# Patient Record
Sex: MALE | Race: WHITE | NOT HISPANIC OR LATINO | URBAN - METROPOLITAN AREA
[De-identification: names, ages, dates, MRNs, and addresses within clinical notes are randomized per-mention and may not be internally consistent; named-entity substitution may affect disease eponyms.]

---

## 2023-05-17 DIAGNOSIS — R53.83 OTHER FATIGUE: Primary | ICD-10-CM

## 2023-05-17 RX ORDER — MULTIVITAMIN
1 TABLET ORAL DAILY
Qty: 30 TABLET | Refills: 0 | Status: SHIPPED | OUTPATIENT
Start: 2023-05-17 | End: 2023-06-01 | Stop reason: SDUPTHER

## 2023-05-17 RX ORDER — APIXABAN 5 MG/1
5 TABLET, FILM COATED ORAL
COMMUNITY
End: 2023-05-17 | Stop reason: SDUPTHER

## 2023-05-17 RX ORDER — FOLIC ACID 1 MG/1
1 TABLET ORAL DAILY
COMMUNITY
Start: 2022-11-10 | End: 2023-05-17 | Stop reason: SDUPTHER

## 2023-05-17 RX ORDER — METOPROLOL SUCCINATE 50 MG/1
50 TABLET, EXTENDED RELEASE ORAL DAILY
Qty: 30 TABLET | Refills: 0 | Status: SHIPPED | OUTPATIENT
Start: 2023-05-17 | End: 2023-06-16

## 2023-05-17 RX ORDER — LANOLIN ALCOHOL/MO/W.PET/CERES
100 CREAM (GRAM) TOPICAL DAILY
Qty: 30 TABLET | Refills: 0 | Status: SHIPPED | OUTPATIENT
Start: 2023-05-17 | End: 2023-08-23

## 2023-05-17 RX ORDER — FOLIC ACID 1 MG/1
1 TABLET ORAL DAILY
Qty: 30 TABLET | Refills: 0 | Status: SHIPPED | OUTPATIENT
Start: 2023-05-17 | End: 2023-06-16

## 2023-05-17 RX ORDER — METOPROLOL SUCCINATE 50 MG/1
1 TABLET, EXTENDED RELEASE ORAL DAILY
COMMUNITY
Start: 2022-11-10 | End: 2023-05-17 | Stop reason: SDUPTHER

## 2023-05-17 RX ORDER — MULTIVITAMIN
1 TABLET ORAL DAILY
COMMUNITY
End: 2023-05-17 | Stop reason: SDUPTHER

## 2023-05-17 RX ORDER — SPIRONOLACTONE 25 MG/1
1 TABLET ORAL DAILY
COMMUNITY
Start: 2022-11-10 | End: 2023-05-17 | Stop reason: SDUPTHER

## 2023-05-17 RX ORDER — LANOLIN ALCOHOL/MO/W.PET/CERES
100 CREAM (GRAM) TOPICAL DAILY
COMMUNITY
End: 2023-05-17 | Stop reason: SDUPTHER

## 2023-05-17 RX ORDER — SPIRONOLACTONE 25 MG/1
25 TABLET ORAL DAILY
Qty: 30 TABLET | Refills: 0 | Status: SHIPPED | OUTPATIENT
Start: 2023-05-17 | End: 2023-09-22

## 2023-05-17 NOTE — TELEPHONE ENCOUNTER
patient in need of Eliquis 5 mg 2x day, Folic Acid 1 mg 1-day, Metroplolo 50 mg 1-day, Multi Vit. 1-day, Spirlactone 25 mg1-day, Thiamine 100 mg 1-day to [address]

## 2023-05-24 PROBLEM — I42.8 NONISCHEMIC CARDIOMYOPATHY (MULTI): Status: ACTIVE | Noted: 2023-05-24

## 2023-05-24 PROBLEM — F10.20 ALCOHOL DEPENDENCE (MULTI): Status: ACTIVE | Noted: 2023-05-24

## 2023-05-24 PROBLEM — M06.9 RHEUMATOID ARTHRITIS (MULTI): Status: ACTIVE | Noted: 2023-05-24

## 2023-05-24 PROBLEM — I48.0 PAROXYSMAL ATRIAL FIBRILLATION WITH RAPID VENTRICULAR RESPONSE (MULTI): Status: ACTIVE | Noted: 2023-05-24

## 2023-05-24 PROBLEM — I27.20 PULMONARY HYPERTENSION (MULTI): Status: ACTIVE | Noted: 2023-05-24

## 2023-05-24 PROBLEM — I50.22 CHRONIC SYSTOLIC HEART FAILURE, ACC/AHA STAGE C (MULTI): Status: ACTIVE | Noted: 2023-05-24

## 2023-05-24 PROBLEM — K59.00 CONSTIPATION: Status: ACTIVE | Noted: 2023-05-24

## 2023-05-24 PROBLEM — M21.372 FOOT DROP, LEFT FOOT: Status: ACTIVE | Noted: 2023-05-24

## 2023-05-24 PROBLEM — I10 HYPERTENSION: Status: ACTIVE | Noted: 2023-05-24

## 2023-05-24 PROBLEM — I07.1 TRICUSPID REGURGITATION: Status: ACTIVE | Noted: 2023-05-24

## 2023-05-24 RX ORDER — LANOLIN ALCOHOL/MO/W.PET/CERES
1 CREAM (GRAM) TOPICAL 2 TIMES DAILY
COMMUNITY
Start: 2022-06-13 | End: 2023-06-01 | Stop reason: SDUPTHER

## 2023-05-24 RX ORDER — SACUBITRIL AND VALSARTAN 49; 51 MG/1; MG/1
1 TABLET, FILM COATED ORAL 2 TIMES DAILY
COMMUNITY
End: 2023-09-28

## 2023-05-31 NOTE — PROGRESS NOTES
Subjective   Patient ID: Del Gupta is a 62 y.o. male who presents for Annual Exam (Pt here today for a check up and feeling ok he states. ), Med Refill (Pending ), and Medication Question (Pt states last script said once daily for Eliquis but he thought it was supposed to be twice daily; please resend med if so. ).    HPI     Preventive:   - Lives at home in [place] by himself - home life is good   - Labs: DUE   - Colon CA: DUE   - Shingrix: DUE   - Td: UTD   - COVID-19 vax: DUE, DECLINED   - Lung CA screen (Smoker): Never   - Diet: healthy, well balanced   - Exercise: good, walking around the stores   - Illicit drugs: no   - Alcohol: hasn't had in over a year      CHF/ A fib/ HTN/ Pulm HTN:   - Followed by cardiologist, Dr. Pratt, sees him regulalry and has been stable     Rheumatoid arthritis:  - Doing ok, sees an ortho - drop foot is improving   - Needs referral to rheumatologist as it is painful for him     Review of Systems   Respiratory:  Negative for chest tightness and shortness of breath.    Cardiovascular:  Negative for chest pain, palpitations and leg swelling.   Musculoskeletal:  Positive for arthralgias, gait problem, joint swelling and myalgias.       Objective   BP (!) 142/100   Pulse 71   Temp 37.3 °C (99.1 °F)   Resp 20   Ht 1.829 m (6')   Wt 78.9 kg (174 lb)   SpO2 99%   BMI 23.60 kg/m²     Physical Exam  Constitutional:       General: He is not in acute distress.     Appearance: He is not ill-appearing.   Cardiovascular:      Rate and Rhythm: Normal rate and regular rhythm.   Pulmonary:      Effort: Pulmonary effort is normal.      Breath sounds: Normal breath sounds.   Musculoskeletal:      Right hand: Deformity (ulnar deviation of MCP joints) present.      Left hand: Deformity (ulnar deviation of MCP joints) present.   Neurological:      Mental Status: He is alert.   Psychiatric:         Mood and Affect: Mood normal.         Assessment/Plan   Problem List Items Addressed This  Visit       Chronic systolic heart failure, ACC/AHA stage C (CMS/HCC)    Relevant Orders    Disability Placard    CBC    Comprehensive Metabolic Panel    Hemoglobin A1C    Lipid Panel    Magnesium    Foot drop, left foot    Relevant Orders    Disability Placard    Hypertension    Relevant Orders    Disability Placard    Rheumatoid arthritis (CMS/HCC)    Relevant Orders    Disability Placard    Referral to Rheumatology     Other Visit Diagnoses       Screening for colon cancer    -  Primary    Relevant Orders    Cologuard® colon cancer screening    Other fatigue        Relevant Medications    magnesium oxide (Mag-Ox) 400 mg (241.3 mg magnesium) tablet    multivitamin tablet    apixaban (Eliquis) 5 mg tablet    Need for shingles vaccine        Relevant Medications    zoster vaccine-recombinant adjuvanted (Shingrix) 50 mcg/0.5 mL vaccine

## 2023-06-01 ENCOUNTER — OFFICE VISIT (OUTPATIENT)
Dept: PRIMARY CARE | Facility: CLINIC | Age: 63
End: 2023-06-01
Payer: MEDICAID

## 2023-06-01 VITALS
SYSTOLIC BLOOD PRESSURE: 142 MMHG | BODY MASS INDEX: 23.57 KG/M2 | TEMPERATURE: 99.1 F | OXYGEN SATURATION: 99 % | DIASTOLIC BLOOD PRESSURE: 100 MMHG | HEART RATE: 71 BPM | WEIGHT: 174 LBS | RESPIRATION RATE: 20 BRPM | HEIGHT: 72 IN

## 2023-06-01 DIAGNOSIS — I10 PRIMARY HYPERTENSION: ICD-10-CM

## 2023-06-01 DIAGNOSIS — M06.9 RHEUMATOID ARTHRITIS, INVOLVING UNSPECIFIED SITE, UNSPECIFIED WHETHER RHEUMATOID FACTOR PRESENT (MULTI): ICD-10-CM

## 2023-06-01 DIAGNOSIS — I50.22 CHRONIC SYSTOLIC HEART FAILURE, ACC/AHA STAGE C (MULTI): ICD-10-CM

## 2023-06-01 DIAGNOSIS — R53.83 OTHER FATIGUE: ICD-10-CM

## 2023-06-01 DIAGNOSIS — Z23 NEED FOR SHINGLES VACCINE: ICD-10-CM

## 2023-06-01 DIAGNOSIS — M21.372 FOOT DROP, LEFT FOOT: ICD-10-CM

## 2023-06-01 DIAGNOSIS — Z12.11 SCREENING FOR COLON CANCER: Primary | ICD-10-CM

## 2023-06-01 PROCEDURE — 3080F DIAST BP >= 90 MM HG: CPT | Performed by: PHYSICIAN ASSISTANT

## 2023-06-01 PROCEDURE — 3077F SYST BP >= 140 MM HG: CPT | Performed by: PHYSICIAN ASSISTANT

## 2023-06-01 PROCEDURE — 1036F TOBACCO NON-USER: CPT | Performed by: PHYSICIAN ASSISTANT

## 2023-06-01 PROCEDURE — 99396 PREV VISIT EST AGE 40-64: CPT | Performed by: PHYSICIAN ASSISTANT

## 2023-06-01 RX ORDER — MULTIVITAMIN
1 TABLET ORAL DAILY
Qty: 30 TABLET | Refills: 3 | Status: SHIPPED | OUTPATIENT
Start: 2023-06-01 | End: 2023-10-30

## 2023-06-01 RX ORDER — GABAPENTIN 100 MG/1
1 CAPSULE ORAL 2 TIMES DAILY
COMMUNITY
Start: 2022-08-30 | End: 2023-10-16 | Stop reason: ALTCHOICE

## 2023-06-01 RX ORDER — LANOLIN ALCOHOL/MO/W.PET/CERES
1 CREAM (GRAM) TOPICAL 2 TIMES DAILY
Qty: 60 TABLET | Refills: 3 | Status: SHIPPED | OUTPATIENT
Start: 2023-06-01 | End: 2023-10-30

## 2023-06-01 ASSESSMENT — ENCOUNTER SYMPTOMS
JOINT SWELLING: 1
ARTHRALGIAS: 1
MYALGIAS: 1
PALPITATIONS: 0
CHEST TIGHTNESS: 0
SHORTNESS OF BREATH: 0

## 2023-06-02 ENCOUNTER — TELEPHONE (OUTPATIENT)
Dept: PRIMARY CARE | Facility: CLINIC | Age: 63
End: 2023-06-02
Payer: MEDICAID

## 2023-06-02 NOTE — TELEPHONE ENCOUNTER
Pt seen 6/1/23 and given disability placard, his name was spelled incorrectly. It has now been corrected and a new placard needs printed. Please contact pt to let him know completed at [phone] and mail to pt

## 2023-06-15 DIAGNOSIS — R53.83 OTHER FATIGUE: ICD-10-CM

## 2023-06-16 RX ORDER — METOPROLOL SUCCINATE 50 MG/1
TABLET, EXTENDED RELEASE ORAL
Qty: 30 TABLET | Refills: 0 | Status: SHIPPED | OUTPATIENT
Start: 2023-06-16 | End: 2023-07-19 | Stop reason: SDUPTHER

## 2023-06-16 RX ORDER — FOLIC ACID 1 MG/1
TABLET ORAL
Qty: 30 TABLET | Refills: 0 | Status: SHIPPED | OUTPATIENT
Start: 2023-06-16 | End: 2023-07-19 | Stop reason: SDUPTHER

## 2023-07-18 DIAGNOSIS — R53.83 OTHER FATIGUE: ICD-10-CM

## 2023-07-19 RX ORDER — METOPROLOL SUCCINATE 50 MG/1
TABLET, EXTENDED RELEASE ORAL
Qty: 30 TABLET | Refills: 0 | Status: SHIPPED | OUTPATIENT
Start: 2023-07-19 | End: 2023-08-23

## 2023-07-19 RX ORDER — FOLIC ACID 1 MG/1
TABLET ORAL
Qty: 30 TABLET | Refills: 0 | Status: SHIPPED | OUTPATIENT
Start: 2023-07-19 | End: 2023-08-23

## 2023-08-22 DIAGNOSIS — R53.83 OTHER FATIGUE: ICD-10-CM

## 2023-08-23 RX ORDER — LANOLIN ALCOHOL/MO/W.PET/CERES
100 CREAM (GRAM) TOPICAL DAILY
Qty: 30 TABLET | Refills: 0 | Status: SHIPPED | OUTPATIENT
Start: 2023-08-23 | End: 2023-09-22

## 2023-08-23 RX ORDER — FOLIC ACID 1 MG/1
TABLET ORAL
Qty: 30 TABLET | Refills: 0 | Status: SHIPPED | OUTPATIENT
Start: 2023-08-23 | End: 2023-09-22

## 2023-08-23 RX ORDER — MULTIVITAMIN WITH FOLIC ACID 400 MCG
1 TABLET ORAL DAILY
Qty: 90 EACH | Refills: 1 | Status: SHIPPED | OUTPATIENT
Start: 2023-08-23 | End: 2023-10-16 | Stop reason: ALTCHOICE

## 2023-08-23 RX ORDER — METOPROLOL SUCCINATE 50 MG/1
TABLET, EXTENDED RELEASE ORAL
Qty: 30 TABLET | Refills: 0 | Status: SHIPPED | OUTPATIENT
Start: 2023-08-23 | End: 2023-09-22

## 2023-09-13 PROBLEM — M1A.9XX1 GOUT, TOPHACEOUS: Status: ACTIVE | Noted: 2023-09-13

## 2023-09-13 PROBLEM — M05.20 RHEUMATOID ARTERITIS (MULTI): Status: ACTIVE | Noted: 2023-09-13

## 2023-09-13 PROBLEM — M05.20 RHEUMATOID ARTERITIS (MULTI): Status: RESOLVED | Noted: 2023-09-13 | Resolved: 2023-09-13

## 2023-09-13 RX ORDER — LISINOPRIL 10 MG/1
1 TABLET ORAL DAILY
COMMUNITY
Start: 2016-05-13 | End: 2023-10-16 | Stop reason: ALTCHOICE

## 2023-09-13 RX ORDER — ACETAMINOPHEN 500 MG
2 TABLET ORAL 2 TIMES DAILY
COMMUNITY
Start: 2019-02-06

## 2023-09-13 RX ORDER — ALLOPURINOL 100 MG/1
4 TABLET ORAL DAILY
COMMUNITY
Start: 2016-05-05 | End: 2023-10-16 | Stop reason: ALTCHOICE

## 2023-09-13 RX ORDER — OMEPRAZOLE 20 MG/1
1 CAPSULE, DELAYED RELEASE ORAL DAILY
COMMUNITY
Start: 2017-06-18 | End: 2023-10-16 | Stop reason: ALTCHOICE

## 2023-09-13 RX ORDER — MELOXICAM 15 MG/1
15 TABLET ORAL
COMMUNITY
Start: 2016-11-15 | End: 2023-10-16 | Stop reason: ALTCHOICE

## 2023-09-13 RX ORDER — HYDRALAZINE HYDROCHLORIDE 50 MG/1
1 TABLET, FILM COATED ORAL EVERY 12 HOURS
COMMUNITY
Start: 2016-05-13 | End: 2023-10-16 | Stop reason: ALTCHOICE

## 2023-09-13 RX ORDER — METOPROLOL TARTRATE 50 MG/1
1 TABLET ORAL EVERY 12 HOURS
COMMUNITY
Start: 2016-05-13 | End: 2023-10-16 | Stop reason: ALTCHOICE

## 2023-09-22 DIAGNOSIS — R53.83 OTHER FATIGUE: ICD-10-CM

## 2023-09-22 RX ORDER — LANOLIN ALCOHOL/MO/W.PET/CERES
100 CREAM (GRAM) TOPICAL DAILY
Qty: 30 TABLET | Refills: 0 | Status: SHIPPED | OUTPATIENT
Start: 2023-09-22 | End: 2023-10-30

## 2023-09-22 RX ORDER — SPIRONOLACTONE 25 MG/1
25 TABLET ORAL DAILY
Qty: 90 TABLET | Refills: 0 | Status: SHIPPED | OUTPATIENT
Start: 2023-09-22 | End: 2023-12-27 | Stop reason: SDUPTHER

## 2023-09-22 RX ORDER — FOLIC ACID 1 MG/1
TABLET ORAL
Qty: 30 TABLET | Refills: 0 | Status: SHIPPED | OUTPATIENT
Start: 2023-09-22 | End: 2023-10-30

## 2023-09-22 RX ORDER — METOPROLOL SUCCINATE 50 MG/1
TABLET, EXTENDED RELEASE ORAL
Qty: 30 TABLET | Refills: 0 | Status: SHIPPED | OUTPATIENT
Start: 2023-09-22 | End: 2023-10-30

## 2023-09-28 DIAGNOSIS — I50.22 CHRONIC SYSTOLIC HEART FAILURE, ACC/AHA STAGE C (MULTI): Primary | ICD-10-CM

## 2023-09-28 RX ORDER — SACUBITRIL AND VALSARTAN 49; 51 MG/1; MG/1
1 TABLET, FILM COATED ORAL 2 TIMES DAILY
Qty: 180 TABLET | Refills: 1 | Status: SHIPPED | OUTPATIENT
Start: 2023-09-28 | End: 2023-12-28 | Stop reason: SDUPTHER

## 2023-10-16 ENCOUNTER — OFFICE VISIT (OUTPATIENT)
Dept: CARDIOLOGY | Facility: CLINIC | Age: 63
End: 2023-10-16
Payer: MEDICAID

## 2023-10-16 VITALS
DIASTOLIC BLOOD PRESSURE: 72 MMHG | BODY MASS INDEX: 23.38 KG/M2 | HEART RATE: 114 BPM | SYSTOLIC BLOOD PRESSURE: 136 MMHG | WEIGHT: 172.4 LBS

## 2023-10-16 DIAGNOSIS — I48.0 PAROXYSMAL ATRIAL FIBRILLATION WITH RAPID VENTRICULAR RESPONSE (MULTI): ICD-10-CM

## 2023-10-16 DIAGNOSIS — I50.22 CHRONIC SYSTOLIC HEART FAILURE, ACC/AHA STAGE C (MULTI): ICD-10-CM

## 2023-10-16 DIAGNOSIS — I48.11 LONGSTANDING PERSISTENT ATRIAL FIBRILLATION (MULTI): ICD-10-CM

## 2023-10-16 DIAGNOSIS — I10 PRIMARY HYPERTENSION: ICD-10-CM

## 2023-10-16 DIAGNOSIS — I07.1 TRICUSPID VALVE INSUFFICIENCY, UNSPECIFIED ETIOLOGY: ICD-10-CM

## 2023-10-16 DIAGNOSIS — Z78.9 NEVER SMOKED TOBACCO: ICD-10-CM

## 2023-10-16 DIAGNOSIS — I42.8 NONISCHEMIC CARDIOMYOPATHY (MULTI): ICD-10-CM

## 2023-10-16 DIAGNOSIS — I27.20 PULMONARY HYPERTENSION (MULTI): ICD-10-CM

## 2023-10-16 PROCEDURE — 3075F SYST BP GE 130 - 139MM HG: CPT | Performed by: INTERNAL MEDICINE

## 2023-10-16 PROCEDURE — 3078F DIAST BP <80 MM HG: CPT | Performed by: INTERNAL MEDICINE

## 2023-10-16 PROCEDURE — 1036F TOBACCO NON-USER: CPT | Performed by: INTERNAL MEDICINE

## 2023-10-16 PROCEDURE — 3008F BODY MASS INDEX DOCD: CPT | Performed by: INTERNAL MEDICINE

## 2023-10-16 PROCEDURE — 99214 OFFICE O/P EST MOD 30 MIN: CPT | Performed by: INTERNAL MEDICINE

## 2023-10-16 NOTE — PATIENT INSTRUCTIONS
Continue same medications/treatment.  Patient educated on proper medication use.  Patient educated on risk factor modification.  Please bring any lab results from other providers/physicians to your next appointment.    Please bring all medicines, vitamins, and herbal supplements with you when you come to the office.    Prescriptions will not be filled unless you are compliant with your follow up appointments or have a follow up appointment scheduled as per instruction of your physician. Refills should be requested at the time of your visit.    Follow up in 6 months    I, FELICITY TORIBIO RN, AM SCRIBING FOR AND IN THE PRESENCE OF DR. RANJIT BROCK, DO, FACC

## 2023-10-16 NOTE — PROGRESS NOTES
Patient:  Esvin Gupta  YOB: 1960  MRN: [id number]       HPI:       Esvin Gupta is a 62 y.o. male who returns today for cardiac follow-up.  Patient has a history of nonischemic cardiomyopathy and paroxysmal A-fib and is on appropriate medicines for both.      Objective:     There were no vitals filed for this visit.    Wt Readings from Last 4 Encounters:   06/01/23 78.9 kg (174 lb)   05/15/23 79.6 kg (175 lb 8 oz)   10/04/22 72.3 kg (159 lb 7 oz)   09/20/22 76.7 kg (169 lb)       Allergies:     No Known Allergies     Medications:     Current Outpatient Medications   Medication Instructions    acetaminophen (Tylenol) 500 mg tablet 2 tablets, oral, 2 times daily    allopurinol (Zyloprim) 100 mg tablet 4 tablets, oral, Daily    apixaban (ELIQUIS) 5 mg, oral, 2 times daily    folic acid (Folvite) 1 mg tablet take 1 tablet by mouth once daily    gabapentin (Neurontin) 100 mg capsule 1 capsule, oral, 2 times daily    hydrALAZINE (Apresoline) 50 mg tablet 1 tablet, oral, Every 12 hours    lisinopril 10 mg tablet 1 tablet, oral, Daily    magnesium oxide (MAG-OX) 400 mg, oral, 2 times daily    meloxicam (MOBIC) 15 mg, oral, Daily RT    metoprolol succinate XL (Toprol-XL) 50 mg 24 hr tablet take 1 tablet by mouth once daily    metoprolol tartrate (Lopressor) 50 mg tablet 1 tablet, oral, Every 12 hours    multivitamin tablet 1 tablet, oral, Daily    multivitamin with folic acid (Tab-A-Alf) 400 mcg tablet 1 tablet, oral, Daily    omeprazole (PriLOSEC) 20 mg DR capsule 1 capsule, oral, Daily    sacubitriL-valsartan (Entresto) 49-51 mg tablet 1 tablet, oral, 2 times daily    spironolactone (ALDACTONE) 25 mg, oral, Daily    thiamine (VITAMIN B-1) 100 mg, oral, Daily       Physical Examination:     Constitutional:       Appearance: Healthy appearance. Not in distress.   Neck:      Vascular: No JVR. JVD normal.   Pulmonary:      Effort: Pulmonary effort is normal.      Breath sounds: Normal breath  "sounds. No wheezing. No rhonchi. No rales.   Chest:      Chest wall: Not tender to palpatation.   Cardiovascular:      PMI at left midclavicular line. Normal rate. Regular rhythm. Normal S1. Normal S2.       Murmurs: There is no murmur.      No gallop.  No click. No rub.   Pulses:     Intact distal pulses.   Edema:     Peripheral edema absent.   Abdominal:      General: Bowel sounds are normal.      Palpations: Abdomen is soft.      Tenderness: There is no abdominal tenderness.   Musculoskeletal: Normal range of motion.         General: No tenderness. Skin:     General: Skin is warm and dry.   Neurological:      General: No focal deficit present.      Mental Status: Alert and oriented to person, place and time.          Lab:     CBC:   Lab Results   Component Value Date    WBC 6.7 06/15/2022    RBC 3.63 (L) 06/15/2022    HGB 13.0 (L) 06/15/2022    HCT 38.1 (L) 06/15/2022     06/15/2022        CMP:    Lab Results   Component Value Date     06/15/2022    K 4.5 06/15/2022     06/15/2022    CO2 25 06/15/2022    BUN 34 (H) 06/15/2022    CREATININE 0.70 06/15/2022    GLUCOSE 88 06/15/2022    CALCIUM 8.9 06/15/2022       Magnesium:    Lab Results   Component Value Date    MG 2.20 06/12/2022       Lipid Profile:    Lab Results   Component Value Date    TRIG 81 06/09/2022    HDL 31.0 (A) 06/09/2022       TSH:    Lab Results   Component Value Date    TSH 0.48 06/06/2022       BNP:   Lab Results   Component Value Date     (H) 06/06/2022        PT/INR:    Lab Results   Component Value Date    PROTIME 13.9 (H) 06/06/2022    INR 1.2 (H) 06/06/2022       HgBA1c:    No results found for: \"HGBA1C\"    BMP:  Lab Results   Component Value Date     06/15/2022     06/12/2022     06/11/2022    K 4.5 06/15/2022    K 3.9 06/12/2022    K 4.0 06/11/2022     06/15/2022     (H) 06/12/2022     (H) 06/11/2022    CO2 25 06/15/2022    CO2 25 06/12/2022    CO2 24 06/11/2022    BUN 34 (H) " 06/15/2022    BUN 13 06/12/2022    BUN 8 06/11/2022    CREATININE 0.70 06/15/2022    CREATININE 0.52 06/12/2022    CREATININE 0.55 06/11/2022       CBC:  Lab Results   Component Value Date    WBC 6.7 06/15/2022    WBC 5.3 06/12/2022    WBC 5.3 06/11/2022    RBC 3.63 (L) 06/15/2022    RBC 3.22 (L) 06/12/2022    RBC 3.15 (L) 06/11/2022    HGB 13.0 (L) 06/15/2022    HGB 11.5 (L) 06/12/2022    HGB 11.3 (L) 06/11/2022    HCT 38.1 (L) 06/15/2022    HCT 33.7 (L) 06/12/2022    HCT 33.1 (L) 06/11/2022     (H) 06/15/2022     (H) 06/12/2022     (H) 06/11/2022    MCHC 34.1 06/15/2022    MCHC 34.1 06/12/2022    MCHC 34.1 06/11/2022    RDW 12.0 06/15/2022    RDW 12.0 06/12/2022    RDW 12.2 06/11/2022     06/15/2022     (L) 06/12/2022     (L) 06/11/2022       Cardiac Enzymes:    Lab Results   Component Value Date    TROPHS 11 06/06/2022    TROPHS 8 06/06/2022       Hepatic Function Panel:    Lab Results   Component Value Date    ALKPHOS 60 06/15/2022    ALT 41 06/15/2022    AST 25 06/15/2022    PROT 6.2 (L) 06/15/2022    BILITOT 0.5 06/15/2022       Diagnostic Studies:     Electrocardiogram 12 Lead    Result Date: 2/15/2023  ATRIAL FIBRILLATION WITH RAPID VENTRICULAR RESPONSE Nonspecific ST and T wave abnormality Abnormal ECG When compared with ECG of 07-MAY-2008 02:59, ST now depressed in Anterior leads T wave inversion now evident in Anterior leads Confirmed by Howard Burrows [id number]) on 6/7/2022 2:53:48 PM    Electrocardiogram 12 Lead    Result Date: 2/15/2023  Atrial fibrillation with rapid ventricular response Low voltage QRS Nonspecific ST and T wave abnormality Abnormal ECG When compared with ECG of 06-JUN-2022 09:34, Previous ECG has undetermined rhythm, needs review ST no longer depressed in Anterior leads Confirmed by Howard Burrows [id number]) on 6/9/2022 4:52:37 PM    Electrocardiogram 12 Lead    Result Date: 2/15/2023  Sinus rhythm with premature atrial complexes Possible Left  atrial enlargement Low voltage QRS Prolonged QT Abnormal ECG When compared with ECG of 06-JUN-2022 15:41, Sinus rhythm has replaced Atrial fibrillation Vent. rate has decreased BY  95 BPM Nonspecific T wave abnormality no longer evident in Inferior leads Nonspecific T wave abnormality no longer evident in Anterior leads Confirmed by Aron Rice ([id number]) on 6/10/2022 9:03:56 AM    US RIGHT UPPER QUADRANT    Result Date: 6/7/2022  MRN: [id number] Patient Name: XIOMARA FLORES  STUDY: US RUQ ABDOMEN;  6/7/2022 8:28 am  INDICATION: ATRIAL FIBRILLATION WITH RAPID VENTRICULAR RESPONSE I48.91.  COMPARISON: None  ACCESSION NUMBER(S): [id number]  ORDERING CLINICIAN: TRAVIS SUGGS  TECHNIQUE: Transverse and longitudinal grayscale and color Doppler ultrasound of the right upper quadrant, including the liver, biliary system and pancreas, and including limited views of the right kidney  FINDINGS: LIVER: Contour:  Normal contour, without evidence of cirrhotic change. Echogenicity and Echotexture:  Diffusely echogenic relative to the right kidney, consistent with fatty change. Size (craniocaudal long axis, in cm): 15.2, within normal limits Other:  No compelling sonographic evidence for solid hepatic mass  BILE DUCTS: There is no intra- or proximal / perihilar extrahepatic biliary ductal dilation. Common duct diameter: 3-4 mm.  GALLBLADDER: Shadowing gallstones: Negative Other:  Sludge in the lumen. Edematous wall thickening at least on the side facing the liver. There may be trace pericholecystic fluid between the gallbladder and the gallbladder fossa of the liver. Borderline dilated  PANCREAS: Limited views of portions of the head and/or proximal body of the gland are unremarkable, without duct dilation; other portions of the gland are obscured by overlying, shadowing bowel gas  RIGHT KIDNEY: Size (craniocaudal long axis, in cm): 12.3 Hydronephrosis: Negative Shadowing stone: Negative Other acute unexpected finding/s:  Negative  OTHER: Right upper quadrant ascites: Negative  IMPRESSION: NO SHADOWING GALLSTONES, INCLUDING AFTER CHANGING THE PATIENT'S POSITIONING TO DECUBITUS TO ASSESS FOR MOBILE STONES; HOWEVER, MILD EDEMATOUS GALLBLADDER WALL THICKENING AT LEAST INVOLVING THE SIDE FACING THE LIVER. THERE MAY EVEN BE TRACE FLUID BETWEEN THE GALLBLADDER IN THE GALLBLADDER FOSSA OF THE LIVER. THE GALLBLADDER IS BORDERLINE FOR DILATION.  FOR ANY COMPELLING CLINICAL SIGN OF ACUTE CHOLECYSTITIS, PERHAPS THIS PATIENT HAS A PRESENTLY SONOGRAPHICALLY-OCCULT, IMMOBILE STONE LODGED IN THE GALLBLADDER NECK OR CYSTIC DUCT? FURTHER EVALUATION MAY BE CONSIDERED WITH HIDA OR MRI/MRCP (PREFERABLY WITHOUT AND WITH INTRAVENOUS CONTRAST)    Transesophageal Echo    Result Date: 6/7/2022         [place] [address]  Tel [phone] Fax [phone] TRANSESOPHAGEAL ECHOCARDIOGRAM REPORT Patient Name:     XIOMARA           Solitario Physician:  [id number] Maninder Toribio MD                   [facility] Study Date:       6/7/2022           Referring           [id number] LUCIEN CHIANG                                      Physician: MRN/PID:          [id number]           PCP: Accession/Order#: [id number]          [facility]                                      Location: YOB: 1960         Fellow: Gender:           M                  Nurse:              Oumou Arellano Admit Date:       6/6/2022           Sonographer:        Sera Carrero [facility] Admission Status: Inpatient -        Additional Staff:                   Routine Height:           182.00 cm          CC Report to:       SICU EMCLocation Weight:           75.00 kg           Study Type:         Transesophageal Echo BSA:              1.96 m2 Blood Pressure: 78 /66 mmHg Diagnosis/ICD: I48.91-Unspecified atrial fibrillation Indication:    Atrial Fibrillation Procedure/CPT: AMANDA Complete-[id number]; Moderate Sedation Services initial 15 minutes                 patient >5 years-[id number]  Study Detail: The following Echo studies were performed: 2D, Doppler and color               flow. Agitated saline used as a contrast agent for intraseptal               flow evaluation. The patient was sedated. PHYSICIAN INTERPRETATION: AMANDA Details: Technically adequate omniplane transesophageal echocardiogram performed. Agitated saline contrast and color flow Doppler echo was performed to assess for the presence of a patent foramen ovale. AMANDA Medication: The pharynx was anesthetized with Cetacaine spray and viscous lidocaine. The patient was sedated using moderate sedation. Midazolam and Fentanyl was used to sedate the patient for this exam. AMANDA Procedure: The probe was passed without difficulty. The patient tolerated the procedure well without any apparent complications. Left Ventricle: The left ventricular systolic function is severely decreased, with an estimated ejection fraction of 20-25%. There is global hypokinesis of the left ventricle with minor regional variations. The left ventricular cavity size is mildly dilated. Left ventricular diastolic filling was indeterminate. Left Atrium: The left atrium is moderate to severely dilated. There is no definite left atrial thrombus present. A bubble study using agitated saline was performed. Bubble study is negative. There is a normal sized left atrial appendage and there is no thrombus visualized in the left atrial appendage. Right Ventricle: The right ventricle is normal in size. There is moderately reduced right ventricular systolic function. Right Atrium: The right atrium is moderately to severely dilated. Aortic Valve: The aortic valve is trileaflet. There is no evidence of aortic valve regurgitation. Mitral Valve: The mitral valve is mild to moderately thickened. There is mild to moderate mitral valve regurgitation. Tricuspid Valve: The tricuspid valve is structurally normal. There is moderate tricuspid regurgitation.  Pulmonic Valve: The pulmonic valve is not well visualized. There is no indication of pulmonic valve regurgitation. Pericardium: There is a trivial pericardial effusion. Aorta: The aortic root was not well visualized. There is plaque visualized in the descending aorta which is classified as a Grade 2 [mild (focal or diffuse) intimal thickening of 2-3 mm] atherosclerosis. CONCLUSIONS:  1. The left ventricular systolic function is severely decreased with a 20-25% estimated ejection fraction.  2. There is moderately reduced right ventricular systolic function.  3. The left atrium is moderate to severely dilated.  4. The right atrium is moderately to severely dilated.  5. Mild to moderate mitral valve regurgitation.  6. There is moderate tricuspid regurgitation.  7. No left atrial thrombus.  8. There is global hypokinesis of the left ventricle with minor regional variations.  9. There is plaque visualized in the descending aorta. QUANTITATIVE DATA SUMMARY: TRICUSPID VALVE/RVSP:                             Normal Ranges: Peak TR Velocity: 2.36 m/s RV Syst Pressure: 25.3 mmHg (< 30mmHg) [id number] Maninder Toribio MD Electronically signed on 6/8/2022 at 2:57:19 PM   CT HEAD WO IV CONTRAST    Result Date: 6/6/2022  MRN: [id number] Patient Name: XIOMARA FLORES  STUDY: CT HEAD WO CONTRAST;  6/6/2022 10:59 am  INDICATION: AMS .  COMPARISON: None  ACCESSION NUMBER(S): [id number]  ORDERING CLINICIAN: DANII JACOBSON  TECHNIQUE: Examination was performed in the axial plane with sagittal and coronal reconstructions.  FINDINGS: INTRACRANIAL: There is prominence of the ventricular system and cerebral sulci consistent with cerebral atrophy. No mass or mass effect is identified. There is no hemorrhage or subdural fluid collection. There is no acute infarct. There is a small remote infarct involving the posterior limb of the right internal capsule.  EXTRACRANIAL: Visualized paranasal sinuses and mastoids are clear.  IMPRESSION: No acute  intracranial pathology.    XR CHEST 1 VIEW    Result Date: 6/6/2022  MRN: [id number] Patient Name: XIOMARA FLORES  STUDY: CHEST 1 VIEW; ;  6/6/2022 10:20 am  INDICATION: AMS .  COMPARISON: None.  ACCESSION NUMBER(S): [id number]  ORDERING CLINICIAN: DANII JACOBSON  TECHNIQUE: Portable AP upright  FINDINGS: The cardiac silhouette and mediastinal contours are not enlarged. Lungs are clear. No pleural effusions are present. No acute osseous abnormality is identified.  IMPRESSION: No acute radiographic abnormality      [address], [address] [address]  Tel [phone] Fax [phone]     TRANSTHORACIC ECHOCARDIOGRAM REPORT        Patient Name:     MAXWELL FLORES Reading Physician:   [id number] Aron Rice DO  Study Date:       9/11/2023           Referring Physician: ARON RICE  MRN/PID:          [id number]            PCP:                 Rosalee Johnson CNP  Accession/Order#: [id number]        Department Location: 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     Study Detail: The following Echo studies were performed: 2D, M-Mode, Doppler and  color flow. Technically challenging study due to prominent lung  artifact.  The patient was awake.        PHYSICIAN INTERPRETATION:  Left Ventricle: Left ventricular systolic function is low normal, with an estimated ejection fraction of 50%. There are no regional wall motion abnormalities. The left ventricular cavity size is normal. Spectral Doppler shows an impaired relaxation pattern of left ventricular diastolic filling.  LV Wall Scoring:  All segments are normal.     Left Atrium: The left atrium is normal in size.  Right Ventricle: The right ventricle is normal in size. There is normal right ventricular global systolic function.  Right Atrium: The right atrium is normal in size.  Aortic Valve: The aortic valve appears structurally normal. The aortic valve appears tricuspid. There is no evidence of aortic valve regurgitation. The peak instantaneous gradient of the aortic valve is 8.0 mmHg. The mean gradient of the aortic valve is 5.0 mmHg.  Mitral Valve: The mitral valve is normal in structure. There is no evidence of mitral valve stenosis. The doppler estimated mean and peak diastolic pressure gradients are 1.0 mmHg and 3.5 mmHg respectively. There is normal mitral valve leaflet mobility. There is mild mitral valve regurgitation.  Tricuspid Valve: The tricuspid valve is structurally normal. There is normal tricuspid valve leaflet mobility. There is mild tricuspid regurgitation.  Pulmonic Valve: The pulmonic valve is structurally normal. There is no indication of pulmonic valve regurgitation.  Pericardium: There is no pericardial effusion noted.  Aorta: The aortic root is normal.  Pulmonary Artery: The tricuspid regurgitant velocity is 2.93 m/s, and with an estimated right atrial pressure of 3 mmHg, the estimated pulmonary artery pressure is moderately elevated with the RVSP at 37.4 mmHg.  Systemic Veins: The inferior vena cava appears to be of normal size.  In comparison to the previous echocardiogram(s): Prior examinations are available and were reviewed for comparison purposes.         CONCLUSIONS:  1. Left ventricular systolic function is low normal with a 50% estimated ejection fraction.  2. Spectral Doppler shows an impaired relaxation pattern of left ventricular diastolic filling.  3. There is no evidence of mitral valve stenosis.  4. Mild mitral valve regurgitation.  5. Mild tricuspid regurgitation is visualized.  6. Moderately elevated pulmonary artery pressure.     RECOMMENDATIONS:  Technically suboptimal and limited study, therefore accuracy of above interpretation could be substantially diminished. Clinical correlation is advised. Consider additional imaging modalities if clinically indicated.  Radiology:     No orders to display       Problem List:     Patient Active Problem List   Diagnosis    Chronic systolic heart failure, ACC/AHA stage C (CMS/HCC)    Constipation    Foot drop, left foot    Hypertension    Nonischemic cardiomyopathy (CMS/HCC)    Paroxysmal atrial fibrillation with rapid ventricular response (CMS/HCC)    Pulmonary hypertension (CMS/HCC)    Rheumatoid arthritis (CMS/HCC)    Tricuspid regurgitation    Alcohol dependence (CMS/HCC)    Alcohol withdrawal syndrome (CMS/HCC)    Atrial fibrillation (CMS/HCC)    Gout, tophaceous    Thrombocytopenia (CMS/HCC)       Asessment:   62-year-old gentleman here for follow-up.    Meds, vitals, examination as noted.    Chart was reviewed in detail including prior cardiac studies and the current echocardiogram and discussed and reviewed with the patient in detail.    Impressions:  Nonischemic cardiomyopathy with current ejection fraction of 50%  Paroxysmal atrial fibrillation with current Jose normal rhythm  Pulmonary hypertension  Essential hypertension controlled  Long-term use of anticoagulation  History of alcohol dependency  Mild MR and TR.        Recommendation:    Plan:   Maintain current medical therapy  Exercise as tolerated  Call if any condition changes or symptomatology of care  Return in 6 months  Check EKG on next  visit.

## 2023-10-30 DIAGNOSIS — R53.83 OTHER FATIGUE: ICD-10-CM

## 2023-10-30 RX ORDER — LANOLIN ALCOHOL/MO/W.PET/CERES
1 CREAM (GRAM) TOPICAL 2 TIMES DAILY
Qty: 60 TABLET | Refills: 3 | Status: SHIPPED | OUTPATIENT
Start: 2023-10-30 | End: 2023-12-28 | Stop reason: SDUPTHER

## 2023-10-30 RX ORDER — FOLIC ACID 1 MG/1
TABLET ORAL
Qty: 30 TABLET | Refills: 0 | Status: SHIPPED | OUTPATIENT
Start: 2023-10-30 | End: 2023-11-27

## 2023-10-30 RX ORDER — LANOLIN ALCOHOL/MO/W.PET/CERES
100 CREAM (GRAM) TOPICAL DAILY
Qty: 30 TABLET | Refills: 0 | Status: SHIPPED | OUTPATIENT
Start: 2023-10-30 | End: 2023-12-28 | Stop reason: SDUPTHER

## 2023-10-30 RX ORDER — METOPROLOL SUCCINATE 50 MG/1
TABLET, EXTENDED RELEASE ORAL
Qty: 30 TABLET | Refills: 0 | Status: SHIPPED | OUTPATIENT
Start: 2023-10-30 | End: 2023-11-27

## 2023-10-30 RX ORDER — MULTIVITAMIN
1 TABLET ORAL DAILY
Qty: 120 TABLET | Refills: 0 | Status: SHIPPED | OUTPATIENT
Start: 2023-10-30 | End: 2023-11-27

## 2023-11-25 DIAGNOSIS — R53.83 OTHER FATIGUE: ICD-10-CM

## 2023-11-27 RX ORDER — METOPROLOL SUCCINATE 50 MG/1
TABLET, EXTENDED RELEASE ORAL
Qty: 30 TABLET | Refills: 0 | Status: SHIPPED | OUTPATIENT
Start: 2023-11-27 | End: 2023-12-27 | Stop reason: SDUPTHER

## 2023-11-27 RX ORDER — MULTIVITAMIN
1 TABLET ORAL DAILY
Qty: 120 TABLET | Refills: 0 | Status: SHIPPED | OUTPATIENT
Start: 2023-11-27 | End: 2023-12-28 | Stop reason: SDUPTHER

## 2023-11-27 RX ORDER — FOLIC ACID 1 MG/1
TABLET ORAL
Qty: 30 TABLET | Refills: 0 | Status: SHIPPED | OUTPATIENT
Start: 2023-11-27 | End: 2023-12-27 | Stop reason: SDUPTHER

## 2023-12-27 DIAGNOSIS — R53.83 OTHER FATIGUE: ICD-10-CM

## 2023-12-27 RX ORDER — METOPROLOL SUCCINATE 50 MG/1
50 TABLET, EXTENDED RELEASE ORAL DAILY
Qty: 30 TABLET | Refills: 0 | Status: SHIPPED | OUTPATIENT
Start: 2023-12-27 | End: 2023-12-28 | Stop reason: SDUPTHER

## 2023-12-27 RX ORDER — FOLIC ACID 1 MG/1
1 TABLET ORAL DAILY
Qty: 30 TABLET | Refills: 0 | Status: SHIPPED | OUTPATIENT
Start: 2023-12-27 | End: 2023-12-28 | Stop reason: SDUPTHER

## 2023-12-27 RX ORDER — SPIRONOLACTONE 25 MG/1
25 TABLET ORAL DAILY
Qty: 30 TABLET | Refills: 0 | Status: SHIPPED | OUTPATIENT
Start: 2023-12-27 | End: 2023-12-28 | Stop reason: SDUPTHER

## 2023-12-27 NOTE — TELEPHONE ENCOUNTER
Patient phones the office today stating he thought [facility] sent a request for his medications which they did not.     Patient is now in need today for his scripts and was advised JA is out of the office and we can request TW to send in a short term to his local pharmacy which is [facility] on [address] in [place].     Please send short term of the following scripts today if possible.     *Metroprolol Succinate XL 50mg 1 TAB every day   *Folic Acid 1mg 1 TAB every day   *Spironolactone 25mg 1 TAB every day     Thank you.

## 2023-12-28 DIAGNOSIS — R53.83 OTHER FATIGUE: ICD-10-CM

## 2023-12-28 DIAGNOSIS — I48.11 LONGSTANDING PERSISTENT ATRIAL FIBRILLATION (MULTI): ICD-10-CM

## 2023-12-28 DIAGNOSIS — I10 PRIMARY HYPERTENSION: ICD-10-CM

## 2023-12-28 DIAGNOSIS — I50.22 CHRONIC SYSTOLIC HEART FAILURE, ACC/AHA STAGE C (MULTI): ICD-10-CM

## 2023-12-28 DIAGNOSIS — I42.8 NONISCHEMIC CARDIOMYOPATHY (MULTI): ICD-10-CM

## 2023-12-28 RX ORDER — FOLIC ACID 1 MG/1
1 TABLET ORAL DAILY
Qty: 90 TABLET | Refills: 0 | Status: SHIPPED | OUTPATIENT
Start: 2023-12-28 | End: 2024-05-01

## 2023-12-28 RX ORDER — LANOLIN ALCOHOL/MO/W.PET/CERES
100 CREAM (GRAM) TOPICAL DAILY
Qty: 90 TABLET | Refills: 0 | Status: SHIPPED | OUTPATIENT
Start: 2023-12-28 | End: 2024-03-27 | Stop reason: SDUPTHER

## 2023-12-28 RX ORDER — FOLIC ACID 1 MG/1
1 TABLET ORAL DAILY
Qty: 90 TABLET | Refills: 0 | Status: SHIPPED | OUTPATIENT
Start: 2023-12-28 | End: 2023-12-28 | Stop reason: SDUPTHER

## 2023-12-28 RX ORDER — SPIRONOLACTONE 25 MG/1
25 TABLET ORAL DAILY
Qty: 90 TABLET | Refills: 0 | Status: SHIPPED | OUTPATIENT
Start: 2023-12-28 | End: 2023-12-28 | Stop reason: SDUPTHER

## 2023-12-28 RX ORDER — LANOLIN ALCOHOL/MO/W.PET/CERES
1 CREAM (GRAM) TOPICAL 2 TIMES DAILY
Qty: 180 TABLET | Refills: 0 | Status: SHIPPED | OUTPATIENT
Start: 2023-12-28 | End: 2024-04-02

## 2023-12-28 RX ORDER — METOPROLOL SUCCINATE 50 MG/1
50 TABLET, EXTENDED RELEASE ORAL DAILY
Qty: 90 TABLET | Refills: 0 | Status: SHIPPED | OUTPATIENT
Start: 2023-12-28 | End: 2024-05-03

## 2023-12-28 RX ORDER — MULTIVITAMIN
1 TABLET ORAL DAILY
Qty: 90 TABLET | Refills: 0 | Status: SHIPPED | OUTPATIENT
Start: 2023-12-28 | End: 2024-05-03

## 2023-12-28 RX ORDER — METOPROLOL SUCCINATE 50 MG/1
50 TABLET, EXTENDED RELEASE ORAL DAILY
Qty: 90 TABLET | Refills: 0 | Status: SHIPPED | OUTPATIENT
Start: 2023-12-28 | End: 2023-12-28 | Stop reason: SDUPTHER

## 2023-12-28 RX ORDER — SACUBITRIL AND VALSARTAN 49; 51 MG/1; MG/1
1 TABLET, FILM COATED ORAL 2 TIMES DAILY
Qty: 180 TABLET | Refills: 0 | Status: SHIPPED | OUTPATIENT
Start: 2023-12-28 | End: 2024-05-02 | Stop reason: ALTCHOICE

## 2023-12-28 RX ORDER — SPIRONOLACTONE 25 MG/1
25 TABLET ORAL DAILY
Qty: 90 TABLET | Refills: 0 | Status: SHIPPED | OUTPATIENT
Start: 2023-12-28 | End: 2024-05-03

## 2024-03-26 DIAGNOSIS — R53.83 OTHER FATIGUE: ICD-10-CM

## 2024-03-27 RX ORDER — LANOLIN ALCOHOL/MO/W.PET/CERES
100 CREAM (GRAM) TOPICAL DAILY
Qty: 90 TABLET | Refills: 1 | Status: SHIPPED | OUTPATIENT
Start: 2024-03-27

## 2024-04-01 DIAGNOSIS — R53.83 OTHER FATIGUE: ICD-10-CM

## 2024-04-02 RX ORDER — LANOLIN ALCOHOL/MO/W.PET/CERES
1 CREAM (GRAM) TOPICAL 2 TIMES DAILY
Qty: 60 TABLET | Refills: 10 | Status: SHIPPED | OUTPATIENT
Start: 2024-04-02

## 2024-04-15 ENCOUNTER — OFFICE VISIT (OUTPATIENT)
Dept: CARDIOLOGY | Facility: CLINIC | Age: 64
End: 2024-04-15
Payer: MEDICAID

## 2024-04-15 VITALS
HEART RATE: 102 BPM | HEIGHT: 72 IN | BODY MASS INDEX: 23.28 KG/M2 | DIASTOLIC BLOOD PRESSURE: 90 MMHG | SYSTOLIC BLOOD PRESSURE: 170 MMHG | WEIGHT: 171.9 LBS

## 2024-04-15 DIAGNOSIS — I42.8 NONISCHEMIC CARDIOMYOPATHY (MULTI): ICD-10-CM

## 2024-04-15 DIAGNOSIS — I48.0 PAROXYSMAL ATRIAL FIBRILLATION WITH RAPID VENTRICULAR RESPONSE (MULTI): ICD-10-CM

## 2024-04-15 DIAGNOSIS — Z79.01 LONG TERM CURRENT USE OF ANTICOAGULANT THERAPY: ICD-10-CM

## 2024-04-15 DIAGNOSIS — I27.20 PULMONARY HYPERTENSION (MULTI): ICD-10-CM

## 2024-04-15 DIAGNOSIS — Z78.9 NEVER SMOKED TOBACCO: ICD-10-CM

## 2024-04-15 DIAGNOSIS — I10 HYPERTENSION, UNSPECIFIED TYPE: ICD-10-CM

## 2024-04-15 DIAGNOSIS — I50.22 CHRONIC SYSTOLIC HEART FAILURE, ACC/AHA STAGE C (MULTI): ICD-10-CM

## 2024-04-15 PROCEDURE — 3077F SYST BP >= 140 MM HG: CPT | Performed by: INTERNAL MEDICINE

## 2024-04-15 PROCEDURE — 1036F TOBACCO NON-USER: CPT | Performed by: INTERNAL MEDICINE

## 2024-04-15 PROCEDURE — 3080F DIAST BP >= 90 MM HG: CPT | Performed by: INTERNAL MEDICINE

## 2024-04-15 PROCEDURE — 99214 OFFICE O/P EST MOD 30 MIN: CPT | Performed by: INTERNAL MEDICINE

## 2024-04-15 PROCEDURE — 3008F BODY MASS INDEX DOCD: CPT | Performed by: INTERNAL MEDICINE

## 2024-04-15 NOTE — PATIENT INSTRUCTIONS
Increase your current dose of Entresto 49-51 mg to  mg twice a day.  You may use up your current pills by taking two tablets twice a day  Office visit with NP for B/P check in 2-3 weeks  Follow up office visit in 4 months.    Continue same medications/treatment.  Patient educated on proper medication use.  Patient educated on risk factor modification.  Please bring any lab results from other providers / physicians to your next appointment.    Please bring all medicines, vitamins and herbal supplements with you when you come to the office.    Prescriptions will not be filled unless you are compliant with your follow up appointments or have a follow up  appointment scheduled as per instruction of your physician.  Refills should be requested at the time of  Your visit.    IAnny LPN, am scribing for and in the presence of Dr. Aron Rice, DO, FACC

## 2024-04-15 NOTE — PROGRESS NOTES
Patient:  Esvin Gupta  YOB: 1960  MRN: [id number]       HPI:       Esvin Gupta is a 63 y.o. male who returns today for cardiac follow-up.  Patient has history of nonischemic cardiomyopathy.  He is on appropriate medications.  He states feeling well.  In September his echo showed EF 50%.  His blood pressure is high today so organ to double his Entresto.  Otherwise things are fine open continue as before.      Objective:     There were no vitals filed for this visit.    Wt Readings from Last 4 Encounters:   10/16/23 78.2 kg (172 lb 6.4 oz)   06/01/23 78.9 kg (174 lb)   05/15/23 79.6 kg (175 lb 8 oz)   10/04/22 72.3 kg (159 lb 7 oz)       Allergies:     No Known Allergies     Medications:     Current Outpatient Medications   Medication Instructions    acetaminophen (Tylenol) 500 mg tablet 2 tablets, oral, 2 times daily    apixaban (ELIQUIS) 5 mg, oral, 2 times daily    folic acid (FOLVITE) 1 mg, oral, Daily    magnesium oxide (MAG-OX) 400 mg, oral, 2 times daily    metoprolol succinate XL (TOPROL-XL) 50 mg, oral, Daily, Do not crush or chew.    multivitamin tablet 1 tablet, oral, Daily    sacubitriL-valsartan (Entresto) 49-51 mg tablet 1 tablet, oral, 2 times daily    spironolactone (ALDACTONE) 25 mg, oral, Daily    thiamine (VITAMIN B-1) 100 mg, oral, Daily       Physical Examination:   Constitutional:       Appearance: Healthy appearance. Not in distress.   Eyes:      Conjunctiva/sclera: Conjunctivae normal.      Pupils: Pupils are equal, round, and reactive to light.   Neck:      Vascular: No JVR. JVD normal.   Pulmonary:      Effort: Pulmonary effort is normal.      Breath sounds: Normal breath sounds. No wheezing. No rhonchi. No rales.   Chest:      Chest wall: Not tender to palpatation.   Cardiovascular:      PMI at left midclavicular line. Normal rate. Regular rhythm. Normal S1. Normal S2.       Murmurs: There is no murmur.      No gallop.  No click. No rub.   Pulses:     Intact  "distal pulses.   Edema:     Peripheral edema absent.   Abdominal:      Tenderness: There is no abdominal tenderness.   Musculoskeletal: Normal range of motion.         General: No tenderness.      Cervical back: Normal range of motion. Skin:     General: Skin is warm and dry.   Neurological:      General: No focal deficit present.      Mental Status: Alert and oriented to person, place and time.          Lab:     CBC:   Lab Results   Component Value Date    WBC 6.7 06/15/2022    RBC 3.63 (L) 06/15/2022    HGB 13.0 (L) 06/15/2022    HCT 38.1 (L) 06/15/2022     06/15/2022        CMP:    Lab Results   Component Value Date     06/15/2022    K 4.5 06/15/2022     06/15/2022    CO2 25 06/15/2022    BUN 34 (H) 06/15/2022    CREATININE 0.70 06/15/2022    GLUCOSE 88 06/15/2022    CALCIUM 8.9 06/15/2022       Magnesium:    Lab Results   Component Value Date    MG 2.20 06/12/2022       Lipid Profile:    Lab Results   Component Value Date    TRIG 81 06/09/2022    HDL 31.0 (A) 06/09/2022       TSH:    Lab Results   Component Value Date    TSH 0.48 06/06/2022       BNP:   Lab Results   Component Value Date     (H) 06/06/2022        PT/INR:    Lab Results   Component Value Date    PROTIME 13.9 (H) 06/06/2022    INR 1.2 (H) 06/06/2022       HgBA1c:    No results found for: \"HGBA1C\"    BMP:  Lab Results   Component Value Date     06/15/2022     06/12/2022     06/11/2022    K 4.5 06/15/2022    K 3.9 06/12/2022    K 4.0 06/11/2022     06/15/2022     (H) 06/12/2022     (H) 06/11/2022    CO2 25 06/15/2022    CO2 25 06/12/2022    CO2 24 06/11/2022    BUN 34 (H) 06/15/2022    BUN 13 06/12/2022    BUN 8 06/11/2022    CREATININE 0.70 06/15/2022    CREATININE 0.52 06/12/2022    CREATININE 0.55 06/11/2022       CBC:  Lab Results   Component Value Date    WBC 6.7 06/15/2022    WBC 5.3 06/12/2022    WBC 5.3 06/11/2022    RBC 3.63 (L) 06/15/2022    RBC 3.22 (L) 06/12/2022    RBC 3.15 " (L) 06/11/2022    HGB 13.0 (L) 06/15/2022    HGB 11.5 (L) 06/12/2022    HGB 11.3 (L) 06/11/2022    HCT 38.1 (L) 06/15/2022    HCT 33.7 (L) 06/12/2022    HCT 33.1 (L) 06/11/2022     (H) 06/15/2022     (H) 06/12/2022     (H) 06/11/2022    MCHC 34.1 06/15/2022    MCHC 34.1 06/12/2022    MCHC 34.1 06/11/2022    RDW 12.0 06/15/2022    RDW 12.0 06/12/2022    RDW 12.2 06/11/2022     06/15/2022     (L) 06/12/2022     (L) 06/11/2022       Cardiac Enzymes:    Lab Results   Component Value Date    TROPHS 11 06/06/2022    TROPHS 8 06/06/2022       Hepatic Function Panel:    Lab Results   Component Value Date    ALKPHOS 60 06/15/2022    ALT 41 06/15/2022    AST 25 06/15/2022    PROT 6.2 (L) 06/15/2022    BILITOT 0.5 06/15/2022       Diagnostic Studies:     Electrocardiogram 12 Lead    Result Date: 2/15/2023  ATRIAL FIBRILLATION WITH RAPID VENTRICULAR RESPONSE Nonspecific ST and T wave abnormality Abnormal ECG When compared with ECG of 07-MAY-2008 02:59, ST now depressed in Anterior leads T wave inversion now evident in Anterior leads Confirmed by Howard Burrows ([id number]) on 6/7/2022 2:53:48 PM    Electrocardiogram 12 Lead    Result Date: 2/15/2023  Atrial fibrillation with rapid ventricular response Low voltage QRS Nonspecific ST and T wave abnormality Abnormal ECG When compared with ECG of 06-JUN-2022 09:34, Previous ECG has undetermined rhythm, needs review ST no longer depressed in Anterior leads Confirmed by Howard Burrows ([id number]) on 6/9/2022 4:52:37 PM    Electrocardiogram 12 Lead    Result Date: 2/15/2023  Sinus rhythm with premature atrial complexes Possible Left atrial enlargement Low voltage QRS Prolonged QT Abnormal ECG When compared with ECG of 06-JUN-2022 15:41, Sinus rhythm has replaced Atrial fibrillation Vent. rate has decreased BY  95 BPM Nonspecific T wave abnormality no longer evident in Inferior leads Nonspecific T wave abnormality no longer evident in Anterior leads  Confirmed by Aron Rice ([id number]) on 6/10/2022 9:03:56 AM    US RIGHT UPPER QUADRANT    Result Date: 6/7/2022  MRN: [id number] Patient Name: XIOMARA FLORES  STUDY: US RUQ ABDOMEN;  6/7/2022 8:28 am  INDICATION: ATRIAL FIBRILLATION WITH RAPID VENTRICULAR RESPONSE I48.91.  COMPARISON: None  ACCESSION NUMBER(S): [id number]  ORDERING CLINICIAN: TRAVIS SUGGS  TECHNIQUE: Transverse and longitudinal grayscale and color Doppler ultrasound of the right upper quadrant, including the liver, biliary system and pancreas, and including limited views of the right kidney  FINDINGS: LIVER: Contour:  Normal contour, without evidence of cirrhotic change. Echogenicity and Echotexture:  Diffusely echogenic relative to the right kidney, consistent with fatty change. Size (craniocaudal long axis, in cm): 15.2, within normal limits Other:  No compelling sonographic evidence for solid hepatic mass  BILE DUCTS: There is no intra- or proximal / perihilar extrahepatic biliary ductal dilation. Common duct diameter: 3-4 mm.  GALLBLADDER: Shadowing gallstones: Negative Other:  Sludge in the lumen. Edematous wall thickening at least on the side facing the liver. There may be trace pericholecystic fluid between the gallbladder and the gallbladder fossa of the liver. Borderline dilated  PANCREAS: Limited views of portions of the head and/or proximal body of the gland are unremarkable, without duct dilation; other portions of the gland are obscured by overlying, shadowing bowel gas  RIGHT KIDNEY: Size (craniocaudal long axis, in cm): 12.3 Hydronephrosis: Negative Shadowing stone: Negative Other acute unexpected finding/s: Negative  OTHER: Right upper quadrant ascites: Negative  IMPRESSION: NO SHADOWING GALLSTONES, INCLUDING AFTER CHANGING THE PATIENT'S POSITIONING TO DECUBITUS TO ASSESS FOR MOBILE STONES; HOWEVER, MILD EDEMATOUS GALLBLADDER WALL THICKENING AT LEAST INVOLVING THE SIDE FACING THE LIVER. THERE MAY EVEN BE TRACE FLUID BETWEEN THE  GALLBLADDER IN THE GALLBLADDER FOSSA OF THE LIVER. THE GALLBLADDER IS BORDERLINE FOR DILATION.  FOR ANY COMPELLING CLINICAL SIGN OF ACUTE CHOLECYSTITIS, PERHAPS THIS PATIENT HAS A PRESENTLY SONOGRAPHICALLY-OCCULT, IMMOBILE STONE LODGED IN THE GALLBLADDER NECK OR CYSTIC DUCT? FURTHER EVALUATION MAY BE CONSIDERED WITH HIDA OR MRI/MRCP (PREFERABLY WITHOUT AND WITH INTRAVENOUS CONTRAST)        CT HEAD WO IV CONTRAST    Result Date: 6/6/2022  MRN: [id number] Patient Name: XIOMARA FLORES  STUDY: CT HEAD WO CONTRAST;  6/6/2022 10:59 am  INDICATION: AMS .  COMPARISON: None  ACCESSION NUMBER(S): [id number]  ORDERING CLINICIAN: DANII JACOBSON  TECHNIQUE: Examination was performed in the axial plane with sagittal and coronal reconstructions.  FINDINGS: INTRACRANIAL: There is prominence of the ventricular system and cerebral sulci consistent with cerebral atrophy. No mass or mass effect is identified. There is no hemorrhage or subdural fluid collection. There is no acute infarct. There is a small remote infarct involving the posterior limb of the right internal capsule.  EXTRACRANIAL: Visualized paranasal sinuses and mastoids are clear.  IMPRESSION: No acute intracranial pathology.    XR CHEST 1 VIEW    Result Date: 6/6/2022  MRN: [id number] Patient Name: XIOMARA FLORES  STUDY: CHEST 1 VIEW; ;  6/6/2022 10:20 am  INDICATION: AMS .  COMPARISON: None.  ACCESSION NUMBER(S): [id number]  ORDERING CLINICIAN: DANII JACOBSON  TECHNIQUE: Portable AP upright  FINDINGS: The cardiac silhouette and mediastinal contours are not enlarged. Lungs are clear. No pleural effusions are present. No acute osseous abnormality is identified.  IMPRESSION: No acute radiographic abnormality        Radiology:       [address], [address], [place], [place] [address]  Tel [phone] Fax [phone]     TRANSTHORACIC ECHOCARDIOGRAM REPORT        Patient Name:     MAXWELL FLORES Reading Physician:   [id number] Sharp  Krystal    Study Date:       9/11/2023           Referring Physician: ARON BROCK  MRN/PID:          [id number]            PCP:                 Rosalee Johnson CNP  Accession/Order#: [id number]        Department Location: [facility]  Maximino Pagan  YOB: 1960          Fellow:  Gender:           M                   Nurse:  Admit Date:       9/11/2023           Sonographer:         Aron Martínez  Admission Status: Outpatient          Additional Staff:  Height:           182.88 cm           CC Report to:  Weight:           79.38 kg            Study Type:          Echocardiogram  BSA:              2.01 m2  Blood Pressure: 118 /70 mmHg     Diagnosis/ICD: I50.22-Chronic systolic (congestive) heart failure (CHF);  I42.8-Other cardiomyopathies; I27.20-Pulmonary hypertension,  unspecified; 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     Study Detail: The following Echo studies were performed: 2D, M-Mode, Doppler and  color flow. Technically challenging study due to prominent lung  artifact. The patient was awake.        PHYSICIAN INTERPRETATION:  Left Ventricle: Left ventricular systolic function is low normal, with an estimated ejection fraction of 50%. There are no regional wall motion abnormalities. The left ventricular cavity size is normal. Spectral Doppler shows an impaired relaxation pattern of left ventricular diastolic filling.  LV Wall Scoring:  All segments are normal.     Left Atrium: The left atrium is normal in size.  Right Ventricle: The right ventricle is normal in size. There is normal right ventricular global systolic function.  Right Atrium: The right atrium is normal in size.  Aortic Valve: The aortic valve appears structurally normal. The aortic valve appears tricuspid. There is no evidence of aortic valve  regurgitation. The peak instantaneous gradient of the aortic valve is 8.0 mmHg. The mean gradient of the aortic valve is 5.0 mmHg.  Mitral Valve: The mitral valve is normal in structure. There is no evidence of mitral valve stenosis. The doppler estimated mean and peak diastolic pressure gradients are 1.0 mmHg and 3.5 mmHg respectively. There is normal mitral valve leaflet mobility. There is mild mitral valve regurgitation.  Tricuspid Valve: The tricuspid valve is structurally normal. There is normal tricuspid valve leaflet mobility. There is mild tricuspid regurgitation.  Pulmonic Valve: The pulmonic valve is structurally normal. There is no indication of pulmonic valve regurgitation.  Pericardium: There is no pericardial effusion noted.  Aorta: The aortic root is normal.  Pulmonary Artery: The tricuspid regurgitant velocity is 2.93 m/s, and with an estimated right atrial pressure of 3 mmHg, the estimated pulmonary artery pressure is moderately elevated with the RVSP at 37.4 mmHg.  Systemic Veins: The inferior vena cava appears to be of normal size.  In comparison to the previous echocardiogram(s): Prior examinations are available and were reviewed for comparison purposes.        CONCLUSIONS:  1. Left ventricular systolic function is low normal with a 50% estimated ejection fraction.  2. Spectral Doppler shows an impaired relaxation pattern of left ventricular diastolic filling.  3. There is no evidence of mitral valve stenosis.  4. Mild mitral valve regurgitation.  5. Mild tricuspid regurgitation is visualized.  6. Moderately elevated pulmonary artery pressure.     RECOMMENDATIONS:  Technically suboptimal and limited study, therefore accuracy of above interpretation could be substantially diminished. Clinical correlation is advised. Consider additional imaging modalities if clinically indicated.      Problem List:     Patient Active Problem List   Diagnosis    Chronic systolic heart failure, ACC/AHA stage C  (Multi)    Constipation    Foot drop, left foot    Hypertension    Nonischemic cardiomyopathy (Multi)    Paroxysmal atrial fibrillation with rapid ventricular response (Multi)    Pulmonary hypertension (Multi)    Rheumatoid arthritis (Multi)    Tricuspid regurgitation    Alcohol dependence (Multi)    Alcohol withdrawal syndrome (Multi)    Atrial fibrillation (Multi)    Gout, tophaceous    Thrombocytopenia (CMS-HCC)    BMI 23.0-23.9, adult    Never smoked tobacco       Asessment:     Diagnoses and all orders for this visit:  Nonischemic cardiomyopathy (Multi)  Long term current use of anticoagulant therapy  Paroxysmal atrial fibrillation with rapid ventricular response (Multi)  Pulmonary hypertension (Multi)  Hypertension, unspecified type  BMI 23.0-23.9, adult  Never smoked tobacco  Chronic systolic heart failure, ACC/AHA stage C (Multi)    63-year-old gentleman here for follow-up.     Meds, vitals, examination as noted.     Chart was reviewed in detail including prior cardiac studies and the current echocardiogram and discussed and reviewed with the patient in detail.     Impressions:  Nonischemic cardiomyopathy with current ejection fraction of 50%  Paroxysmal atrial fibrillation with current Jose normal rhythm  Pulmonary hypertension  Essential hypertension controlled  Long-term use of anticoagulation  History of alcohol dependency  Mild MR and TR.  Plan:   Recommendation:  Will be increasing Entresto to maximum dose  blood pressure check in 2 to 3 weeks  See me back in 3 to 4 months  Call if any issues or problems otherwise arise

## 2024-04-30 DIAGNOSIS — I50.22 CHRONIC SYSTOLIC HEART FAILURE, ACC/AHA STAGE C (MULTI): ICD-10-CM

## 2024-04-30 DIAGNOSIS — R53.83 OTHER FATIGUE: ICD-10-CM

## 2024-04-30 DIAGNOSIS — I10 PRIMARY HYPERTENSION: ICD-10-CM

## 2024-05-01 DIAGNOSIS — R53.83 OTHER FATIGUE: ICD-10-CM

## 2024-05-01 RX ORDER — FOLIC ACID 1 MG/1
1 TABLET ORAL DAILY
Qty: 30 TABLET | Refills: 10 | Status: SHIPPED | OUTPATIENT
Start: 2024-05-01

## 2024-05-02 ENCOUNTER — OFFICE VISIT (OUTPATIENT)
Dept: CARDIOLOGY | Facility: CLINIC | Age: 64
End: 2024-05-02
Payer: MEDICAID

## 2024-05-02 ENCOUNTER — LAB (OUTPATIENT)
Dept: LAB | Facility: LAB | Age: 64
End: 2024-05-02
Payer: MEDICAID

## 2024-05-02 VITALS
SYSTOLIC BLOOD PRESSURE: 140 MMHG | BODY MASS INDEX: 23.81 KG/M2 | WEIGHT: 175.8 LBS | HEIGHT: 72 IN | DIASTOLIC BLOOD PRESSURE: 62 MMHG | HEART RATE: 78 BPM

## 2024-05-02 DIAGNOSIS — I50.22 CHRONIC SYSTOLIC HEART FAILURE, ACC/AHA STAGE C (MULTI): ICD-10-CM

## 2024-05-02 DIAGNOSIS — I10 HYPERTENSION, UNSPECIFIED TYPE: ICD-10-CM

## 2024-05-02 DIAGNOSIS — I42.8 NONISCHEMIC CARDIOMYOPATHY (MULTI): ICD-10-CM

## 2024-05-02 DIAGNOSIS — I10 HYPERTENSION, UNSPECIFIED TYPE: Primary | ICD-10-CM

## 2024-05-02 LAB
ANION GAP SERPL CALC-SCNC: 12 MMOL/L (ref 10–20)
BUN SERPL-MCNC: 13 MG/DL (ref 6–23)
CALCIUM SERPL-MCNC: 9 MG/DL (ref 8.6–10.3)
CHLORIDE SERPL-SCNC: 102 MMOL/L (ref 98–107)
CO2 SERPL-SCNC: 24 MMOL/L (ref 21–32)
CREAT SERPL-MCNC: 0.86 MG/DL (ref 0.5–1.3)
EGFRCR SERPLBLD CKD-EPI 2021: >90 ML/MIN/1.73M*2
GLUCOSE SERPL-MCNC: 109 MG/DL (ref 74–99)
POTASSIUM SERPL-SCNC: 4.4 MMOL/L (ref 3.5–5.3)
SODIUM SERPL-SCNC: 134 MMOL/L (ref 136–145)

## 2024-05-02 PROCEDURE — 36415 COLL VENOUS BLD VENIPUNCTURE: CPT

## 2024-05-02 PROCEDURE — 80048 BASIC METABOLIC PNL TOTAL CA: CPT

## 2024-05-02 PROCEDURE — 3008F BODY MASS INDEX DOCD: CPT | Performed by: NURSE PRACTITIONER

## 2024-05-02 PROCEDURE — 3078F DIAST BP <80 MM HG: CPT | Performed by: NURSE PRACTITIONER

## 2024-05-02 PROCEDURE — 99215 OFFICE O/P EST HI 40 MIN: CPT | Performed by: NURSE PRACTITIONER

## 2024-05-02 PROCEDURE — 1036F TOBACCO NON-USER: CPT | Performed by: NURSE PRACTITIONER

## 2024-05-02 PROCEDURE — 3077F SYST BP >= 140 MM HG: CPT | Performed by: NURSE PRACTITIONER

## 2024-05-02 ASSESSMENT — ENCOUNTER SYMPTOMS
HEMOPTYSIS: 0
SUSPICIOUS LESIONS: 0
FEVER: 0
ABDOMINAL PAIN: 0
SHORTNESS OF BREATH: 0
CONSTIPATION: 0
DIARRHEA: 0
DYSPNEA ON EXERTION: 0
SPUTUM PRODUCTION: 0
VOMITING: 0
PND: 0
EYES NEGATIVE: 1
MUSCULOSKELETAL NEGATIVE: 1
DIAPHORESIS: 0
ORTHOPNEA: 0
DIZZINESS: 0
NUMBNESS: 0
LIGHT-HEADEDNESS: 0
CHILLS: 0
NEAR-SYNCOPE: 0
SYNCOPE: 0
ALLERGIC/IMMUNOLOGIC NEGATIVE: 1
WHEEZING: 0
UNUSUAL HAIR DISTRIBUTION: 0
DECREASED APPETITE: 0
PALPITATIONS: 0
NEUROLOGICAL NEGATIVE: 1
CLAUDICATION: 0
NAIL CHANGES: 0
NAUSEA: 0
IRREGULAR HEARTBEAT: 0
VISUAL HALOS: 0
ENDOCRINE NEGATIVE: 1
COUGH: 0
HEMATOLOGIC/LYMPHATIC NEGATIVE: 1
POOR WOUND HEALING: 0
CONSTITUTIONAL NEGATIVE: 1
COLOR CHANGE: 0
PSYCHIATRIC NEGATIVE: 1

## 2024-05-02 NOTE — PROGRESS NOTES
CARDIOLOGY OFFICE VISIT      CHIEF COMPLAINT  Chief Complaint   Patient presents with    Follow-up     Pt is here today following up for a 2-3 week BP check        HISTORY OF PRESENT ILLNESS            RECENT TESTING  The following results have been reviewed and updated;    Echo:  Stress:  Coronary Angiogram:  Carotid Ultrasound:    Past Medical History  Past Medical History:   Diagnosis Date    Other forms of angina pectoris (CMS-HCC)     Stable angina pectoris    Personal history of other diseases of the circulatory system     History of hypertension    Personal history of other diseases of the musculoskeletal system and connective tissue     History of arthritis    Underweight 09/20/2022    Underweight       Social History  Social History     Tobacco Use    Smoking status: Never    Smokeless tobacco: Former     Types: Chew     Quit date: 1982   Vaping Use    Vaping status: Never Used   Substance Use Topics    Alcohol use: Not Currently    Drug use: Never       Family History     Family History   Problem Relation Name Age of Onset    Arthritis Mother      Pancreatic cancer Mother      Other (cardiac pacemaker) Father      Stroke Father      Diabetes Sibling          Allergies:  No Known Allergies     Outpatient Medications:  Current Outpatient Medications   Medication Instructions    acetaminophen (Tylenol) 500 mg tablet 2 tablets, oral, 2 times daily    apixaban (ELIQUIS) 5 mg, oral, 2 times daily    folic acid (FOLVITE) 1 mg, oral, Daily    magnesium oxide (MAG-OX) 400 mg, oral, 2 times daily    metoprolol succinate XL (TOPROL-XL) 50 mg, oral, Daily, Do not crush or chew.    multivitamin tablet 1 tablet, oral, Daily    sacubitriL-valsartan (Entresto) 49-51 mg tablet 1 tablet, oral, 2 times daily    sacubitriL-valsartan (Entresto)  mg tablet 1 tablet, oral, 2 times daily    spironolactone (ALDACTONE) 25 mg, oral, Daily    thiamine (VITAMIN B-1) 100 mg, oral, Daily          REVIEW OF  SYSTEMS  ROS      VITALS  Vitals:    05/02/24 1227   BP: 140/62   Pulse: 78       PHYSICAL EXAM  Physical Exam      ASSESSMENT AND PLAN  {Assess/Plan SmartLinks:[id number]}    # ***  -***  -***    # ***  -***  -***       Rudi Mendiola AGACNP-BC  Adult Gerontology Acute Care Nurse Practitioner   [facility] [place]   [facility]  Pager: [phone]    [unfilled]    **Disclaimer: This note was dictated by speech recognition, and every effort has been made to prevent any error in transcription, however minor errors may be present**

## 2024-05-02 NOTE — PROGRESS NOTES
CARDIOLOGY OFFICE VISIT      CHIEF COMPLAINT  Chief Complaint   Patient presents with    Follow-up     Pt is here today following up for a 2-3 week BP check        HISTORY OF PRESENT ILLNESS    63-year-old male comes in for blood pressure check.  140/70  He denies chest pain, shortness of breath, nausea, vomiting, PND, orthopnea, claudications  He is taking the Entresto 97-1 01 tab twice daily      Past Medical History  Past Medical History:   Diagnosis Date    Other forms of angina pectoris (CMS-[facility])     Stable angina pectoris    Personal history of other diseases of the circulatory system     History of hypertension    Personal history of other diseases of the musculoskeletal system and connective tissue     History of arthritis    Underweight 09/20/2022    Underweight       Social History  Social History     Tobacco Use    Smoking status: Never    Smokeless tobacco: Former     Types: Chew     Quit date: 1982   Vaping Use    Vaping status: Never Used   Substance Use Topics    Alcohol use: Not Currently    Drug use: Never       Family History     Family History   Problem Relation Name Age of Onset    Arthritis Mother      Pancreatic cancer Mother      Other (cardiac pacemaker) Father      Stroke Father      Diabetes Sibling          Allergies:  No Known Allergies     Outpatient Medications:  Current Outpatient Medications   Medication Instructions    acetaminophen (Tylenol) 500 mg tablet 2 tablets, oral, 2 times daily    apixaban (ELIQUIS) 5 mg, oral, 2 times daily    folic acid (FOLVITE) 1 mg, oral, Daily    magnesium oxide (MAG-OX) 400 mg, oral, 2 times daily    metoprolol succinate XL (TOPROL-XL) 50 mg, oral, Daily, Do not crush or chew.    multivitamin tablet 1 tablet, oral, Daily    sacubitriL-valsartan (Entresto)  mg tablet 1 tablet, oral, 2 times daily    spironolactone (ALDACTONE) 25 mg, oral, Daily    thiamine (VITAMIN B-1) 100 mg, oral, Daily          REVIEW OF SYSTEMS  Review of Systems    Constitutional: Negative. Negative for chills, decreased appetite, diaphoresis, fever and malaise/fatigue.   HENT: Negative.     Eyes: Negative.  Negative for visual disturbance and visual halos.   Cardiovascular:  Negative for chest pain, claudication, cyanosis, dyspnea on exertion, irregular heartbeat, leg swelling, near-syncope, orthopnea, palpitations, paroxysmal nocturnal dyspnea and syncope.   Respiratory:  Negative for cough, hemoptysis, shortness of breath, sputum production and wheezing.    Endocrine: Negative.    Hematologic/Lymphatic: Negative.    Skin:  Negative for color change, dry skin, flushing, itching, nail changes, poor wound healing, rash, skin cancer, suspicious lesions and unusual hair distribution.   Musculoskeletal: Negative.    Gastrointestinal:  Negative for abdominal pain, constipation, diarrhea, nausea and vomiting.   Genitourinary: Negative.    Neurological: Negative.  Negative for dizziness, light-headedness and numbness.   Psychiatric/Behavioral: Negative.     Allergic/Immunologic: Negative.          VITALS  Vitals:    05/02/24 1227   BP: 140/62   Pulse: 78       PHYSICAL EXAM  Constitutional:       Appearance: Not in distress.   Eyes:      Pupils: Pupils are equal, round, and reactive to light.   HENT:      Head: Normocephalic.   Neck:      Thyroid: No thyroid mass.      Vascular: JVD normal.   Pulmonary:      Effort: Pulmonary effort is normal. No tachypnea or bradypnea.      Breath sounds: Normal breath sounds.   Chest:      Chest wall: Not tender to palpatation.   Cardiovascular:      PMI at left midclavicular line. Normal rate. Regular rhythm. Normal S1. Normal S2.       Murmurs: There is no murmur.      No gallop.  No click. No rub.   Pulses:     Intact distal pulses.   Abdominal:      General: Bowel sounds are normal. There is no distension.      Palpations: Abdomen is soft.   Skin:     General: Skin is warm and dry.   Neurological:      Mental Status: Alert.   Psychiatric:          Behavior: Behavior is cooperative.           ASSESSMENT AND PLAN  Diagnoses and all orders for this visit:  Hypertension, unspecified type  -     Basic metabolic panel; Future  -     sacubitriL-valsartan (Entresto)  mg tablet; Take 1 tablet by mouth 2 times a day.  Nonischemic cardiomyopathy (Multi)  -     sacubitriL-valsartan (Entresto)  mg tablet; Take 1 tablet by mouth 2 times a day.  Chronic systolic heart failure, ACC/AHA stage C (Multi)  -     sacubitriL-valsartan (Entresto)  mg tablet; Take 1 tablet by mouth 2 times a day.    Continue the Entresto  tab twice daily  -BMP today  -With Dr. Rice today as scheduled      Coy Banuelos CNP   [facility]      [unfilled]    **Disclaimer: This note was dictated by speech recognition, and every effort has been made to prevent any error in transcription, however minor errors may be present**

## 2024-05-03 RX ORDER — SPIRONOLACTONE 25 MG/1
25 TABLET ORAL DAILY
Qty: 30 TABLET | Refills: 10 | Status: SHIPPED | OUTPATIENT
Start: 2024-05-03

## 2024-05-03 RX ORDER — MULTIVITAMIN
1 TABLET ORAL DAILY
Qty: 30 TABLET | Refills: 10 | Status: SHIPPED | OUTPATIENT
Start: 2024-05-03

## 2024-05-03 RX ORDER — METOPROLOL SUCCINATE 50 MG/1
50 TABLET, EXTENDED RELEASE ORAL DAILY
Qty: 30 TABLET | Refills: 10 | Status: SHIPPED | OUTPATIENT
Start: 2024-05-03

## 2024-05-17 ENCOUNTER — TELEPHONE (OUTPATIENT)
Dept: PRIMARY CARE | Facility: CLINIC | Age: 64
End: 2024-05-17
Payer: MEDICAID

## 2024-05-17 NOTE — TELEPHONE ENCOUNTER
----- Message from Rosalee Johnson PA-C sent at 5/13/2024  4:32 PM EDT -----  Pt never did his cologuard, does he need some help/instructions on how to do this? I'd like to get the screening results before the Cologuard box he has expires next month.   SAVANNAH Turk

## 2024-07-30 DIAGNOSIS — I48.11 LONGSTANDING PERSISTENT ATRIAL FIBRILLATION (MULTI): ICD-10-CM

## 2024-08-08 RX ORDER — APIXABAN 5 MG/1
5 TABLET, FILM COATED ORAL 2 TIMES DAILY
Qty: 60 TABLET | Refills: 10 | Status: SHIPPED | OUTPATIENT
Start: 2024-08-08

## 2024-08-15 ENCOUNTER — APPOINTMENT (OUTPATIENT)
Dept: CARDIOLOGY | Facility: CLINIC | Age: 64
End: 2024-08-15
Payer: MEDICAID

## 2024-08-15 VITALS
DIASTOLIC BLOOD PRESSURE: 74 MMHG | HEART RATE: 84 BPM | BODY MASS INDEX: 23.2 KG/M2 | HEIGHT: 72 IN | WEIGHT: 171.3 LBS | SYSTOLIC BLOOD PRESSURE: 140 MMHG

## 2024-08-15 DIAGNOSIS — I27.20 PULMONARY HYPERTENSION (MULTI): ICD-10-CM

## 2024-08-15 DIAGNOSIS — I10 HYPERTENSION, UNSPECIFIED TYPE: ICD-10-CM

## 2024-08-15 DIAGNOSIS — I10 PRIMARY HYPERTENSION: ICD-10-CM

## 2024-08-15 DIAGNOSIS — Z78.9 NEVER SMOKED TOBACCO: ICD-10-CM

## 2024-08-15 DIAGNOSIS — I48.11 LONGSTANDING PERSISTENT ATRIAL FIBRILLATION (MULTI): ICD-10-CM

## 2024-08-15 DIAGNOSIS — I50.22 CHRONIC SYSTOLIC HEART FAILURE, ACC/AHA STAGE C (MULTI): ICD-10-CM

## 2024-08-15 PROCEDURE — 3078F DIAST BP <80 MM HG: CPT | Performed by: INTERNAL MEDICINE

## 2024-08-15 PROCEDURE — 1036F TOBACCO NON-USER: CPT | Performed by: INTERNAL MEDICINE

## 2024-08-15 PROCEDURE — 99214 OFFICE O/P EST MOD 30 MIN: CPT | Performed by: INTERNAL MEDICINE

## 2024-08-15 PROCEDURE — 3077F SYST BP >= 140 MM HG: CPT | Performed by: INTERNAL MEDICINE

## 2024-08-15 PROCEDURE — 3008F BODY MASS INDEX DOCD: CPT | Performed by: INTERNAL MEDICINE

## 2024-08-15 RX ORDER — METOPROLOL SUCCINATE 100 MG/1
100 TABLET, EXTENDED RELEASE ORAL DAILY
Qty: 90 TABLET | Refills: 3 | Status: SHIPPED | OUTPATIENT
Start: 2024-08-15 | End: 2025-08-15

## 2024-08-15 NOTE — PROGRESS NOTES
Patient:  Esvin Gupta  YOB: 1960  MRN: [id number]       Chief Complaint/Active Symptoms:       Esvin Gupta is a 63 y.o. male who returns today for cardiac follow-up.  Has a history of a nonischemic cardiomyopathy.  He is on aggressive medical therapy and his ejection fraction is now 50% by most recent studies.  Pressure still running a touch high working to double up his metoprolol to 100 a day.  He denies chest pain or shortness of breath.  He is having some issues in the place where he resides and has requested us to provide a to whom it may concern letter with a brief description of his condition and recommendations.  And will prepare this and have it for him within the next week or so.Dictation #1  MRN:[id number]  [facility] [place]:[id number]       Objective:     Vitals:    08/15/24 1129   BP: 140/74   Pulse: 84       Vitals:    08/15/24 1129   Weight: 77.7 kg (171 lb 4.8 oz)       Allergies:     No Known Allergies       Medications:     Current Outpatient Medications   Medication Instructions    acetaminophen (Tylenol) 500 mg tablet 2 tablets, oral, 2 times daily    Eliquis 5 mg, oral, 2 times daily    folic acid (FOLVITE) 1 mg, oral, Daily    magnesium oxide (MAG-OX) 400 mg, oral, 2 times daily    metoprolol succinate XL (TOPROL-XL) 50 mg, oral, Daily    multivitamin tablet 1 tablet, oral, Daily    sacubitriL-valsartan (Entresto)  mg tablet 1 tablet, oral, 2 times daily    spironolactone (ALDACTONE) 25 mg, oral, Daily    thiamine (VITAMIN B-1) 100 mg, oral, Daily       Physical Examination:   Constitutional:       Appearance: Healthy appearance. Not in distress.   Neck:      Vascular: No JVR. JVD normal.   Pulmonary:      Effort: Pulmonary effort is normal.      Breath sounds: Normal breath sounds. No wheezing. No rhonchi. No rales.   Chest:      Chest wall: Not tender to palpatation.   Cardiovascular:      PMI at left midclavicular line. Normal rate. Regular rhythm. Normal S1. Normal S2.  "      Murmurs: There is no murmur.      No gallop.  No click. No rub.   Pulses:     Intact distal pulses.   Edema:     Peripheral edema absent.   Abdominal:      General: Bowel sounds are normal.      Palpations: Abdomen is soft.      Tenderness: There is no abdominal tenderness.   Musculoskeletal: Normal range of motion.         General: No tenderness. Skin:     General: Skin is warm and dry.   Neurological:      General: No focal deficit present.      Mental Status: Alert and oriented to person, place and time.            Lab:     CBC:   Lab Results   Component Value Date    WBC 6.7 06/15/2022    RBC 3.63 (L) 06/15/2022    HGB 13.0 (L) 06/15/2022    HCT 38.1 (L) 06/15/2022     06/15/2022        CMP:    Lab Results   Component Value Date     (L) 05/02/2024    K 4.4 05/02/2024     05/02/2024    CO2 24 05/02/2024    BUN 13 05/02/2024    CREATININE 0.86 05/02/2024    GLUCOSE 109 (H) 05/02/2024    CALCIUM 9.0 05/02/2024       Magnesium:    Lab Results   Component Value Date    MG 2.20 06/12/2022       Lipid Profile:    Lab Results   Component Value Date    TRIG 81 06/09/2022    HDL 31.0 (A) 06/09/2022       TSH:    Lab Results   Component Value Date    TSH 0.48 06/06/2022       BNP:   Lab Results   Component Value Date     (H) 06/06/2022        PT/INR:    Lab Results   Component Value Date    PROTIME 13.9 (H) 06/06/2022    INR 1.2 (H) 06/06/2022       HgBA1c:    No results found for: \"HGBA1C\"    BMP:  Lab Results   Component Value Date     (L) 05/02/2024     06/15/2022     06/12/2022     06/11/2022    K 4.4 05/02/2024    K 4.5 06/15/2022    K 3.9 06/12/2022    K 4.0 06/11/2022     05/02/2024     06/15/2022     (H) 06/12/2022     (H) 06/11/2022    CO2 24 05/02/2024    CO2 25 06/15/2022    CO2 25 06/12/2022    CO2 24 06/11/2022    BUN 13 05/02/2024    BUN 34 (H) 06/15/2022    BUN 13 06/12/2022    BUN 8 06/11/2022    CREATININE 0.86 05/02/2024    " CREATININE 0.70 06/15/2022    CREATININE 0.52 06/12/2022    CREATININE 0.55 06/11/2022       CBC:  Lab Results   Component Value Date    WBC 6.7 06/15/2022    WBC 5.3 06/12/2022    WBC 5.3 06/11/2022    RBC 3.63 (L) 06/15/2022    RBC 3.22 (L) 06/12/2022    RBC 3.15 (L) 06/11/2022    HGB 13.0 (L) 06/15/2022    HGB 11.5 (L) 06/12/2022    HGB 11.3 (L) 06/11/2022    HCT 38.1 (L) 06/15/2022    HCT 33.7 (L) 06/12/2022    HCT 33.1 (L) 06/11/2022     (H) 06/15/2022     (H) 06/12/2022     (H) 06/11/2022    MCHC 34.1 06/15/2022    MCHC 34.1 06/12/2022    MCHC 34.1 06/11/2022    RDW 12.0 06/15/2022    RDW 12.0 06/12/2022    RDW 12.2 06/11/2022     06/15/2022     (L) 06/12/2022     (L) 06/11/2022       Cardiac Enzymes:    Lab Results   Component Value Date    TROPHS 11 06/06/2022    TROPHS 8 06/06/2022       Hepatic Function Panel:    Lab Results   Component Value Date    ALKPHOS 60 06/15/2022    ALT 41 06/15/2022    AST 25 06/15/2022    PROT 6.2 (L) 06/15/2022    BILITOT 0.5 06/15/2022         Diagnostic Studies:     Electrocardiogram 12 Lead    Result Date: 2/15/2023  ATRIAL FIBRILLATION WITH RAPID VENTRICULAR RESPONSE Nonspecific ST and T wave abnormality Abnormal ECG When compared with ECG of 07-MAY-2008 02:59, ST now depressed in Anterior leads T wave inversion now evident in Anterior leads Confirmed by Howard Burrows ([id number]) on 6/7/2022 2:53:48 PM    Electrocardiogram 12 Lead    Result Date: 2/15/2023  Atrial fibrillation with rapid ventricular response Low voltage QRS Nonspecific ST and T wave abnormality Abnormal ECG When compared with ECG of 06-JUN-2022 09:34, Previous ECG has undetermined rhythm, needs review ST no longer depressed in Anterior leads Confirmed by Howard Burrows ([id number]) on 6/9/2022 4:52:37 PM    Electrocardiogram 12 Lead    Result Date: 2/15/2023  Sinus rhythm with premature atrial complexes Possible Left atrial enlargement Low voltage QRS Prolonged QT  Abnormal ECG When compared with ECG of 06-JUN-2022 15:41, Sinus rhythm has replaced Atrial fibrillation Vent. rate has decreased BY  95 BPM Nonspecific T wave abnormality no longer evident in Inferior leads Nonspecific T wave abnormality no longer evident in Anterior leads Confirmed by Aron Rice ([id number]) on 6/10/2022 9:03:56 AM    US RIGHT UPPER QUADRANT    Result Date: 6/7/2022  MRN: [id number] Patient Name: XIOMARA FLORES  STUDY: US RUQ ABDOMEN;  6/7/2022 8:28 am  INDICATION: ATRIAL FIBRILLATION WITH RAPID VENTRICULAR RESPONSE I48.91.  COMPARISON: None  ACCESSION NUMBER(S): [id number]  ORDERING CLINICIAN: TRAVIS SUGGS  TECHNIQUE: Transverse and longitudinal grayscale and color Doppler ultrasound of the right upper quadrant, including the liver, biliary system and pancreas, and including limited views of the right kidney  FINDINGS: LIVER: Contour:  Normal contour, without evidence of cirrhotic change. Echogenicity and Echotexture:  Diffusely echogenic relative to the right kidney, consistent with fatty change. Size (craniocaudal long axis, in cm): 15.2, within normal limits Other:  No compelling sonographic evidence for solid hepatic mass  BILE DUCTS: There is no intra- or proximal / perihilar extrahepatic biliary ductal dilation. Common duct diameter: 3-4 mm.  GALLBLADDER: Shadowing gallstones: Negative Other:  Sludge in the lumen. Edematous wall thickening at least on the side facing the liver. There may be trace pericholecystic fluid between the gallbladder and the gallbladder fossa of the liver. Borderline dilated  PANCREAS: Limited views of portions of the head and/or proximal body of the gland are unremarkable, without duct dilation; other portions of the gland are obscured by overlying, shadowing bowel gas  RIGHT KIDNEY: Size (craniocaudal long axis, in cm): 12.3 Hydronephrosis: Negative Shadowing stone: Negative Other acute unexpected finding/s: Negative  OTHER: Right upper quadrant ascites:  Negative  IMPRESSION: NO SHADOWING GALLSTONES, INCLUDING AFTER CHANGING THE PATIENT'S POSITIONING TO DECUBITUS TO ASSESS FOR MOBILE STONES; HOWEVER, MILD EDEMATOUS GALLBLADDER WALL THICKENING AT LEAST INVOLVING THE SIDE FACING THE LIVER. THERE MAY EVEN BE TRACE FLUID BETWEEN THE GALLBLADDER IN THE GALLBLADDER FOSSA OF THE LIVER. THE GALLBLADDER IS BORDERLINE FOR DILATION.  FOR ANY COMPELLING CLINICAL SIGN OF ACUTE CHOLECYSTITIS, PERHAPS THIS PATIENT HAS A PRESENTLY SONOGRAPHICALLY-OCCULT, IMMOBILE STONE LODGED IN THE GALLBLADDER NECK OR CYSTIC DUCT? FURTHER EVALUATION MAY BE CONSIDERED WITH HIDA OR MRI/MRCP (PREFERABLY WITHOUT AND WITH INTRAVENOUS CONTRAST)    Transesophageal Echo    Result Date: 6/7/2022         [place] [id number]  Tel [phone] Fax [phone] TRANSESOPHAGEAL ECHOCARDIOGRAM REPORT Patient Name:     XIOMARA           Solitario Physician:  [id number] Maninder Toribio MD                   [facility] Study Date:       6/7/2022           Referring           [id number] LUCIEN CHIANG                                      Physician: MRN/PID:          [id number]           PCP: Accession/Order#: [id number]          [facility]                                      Location: YOB: 1960         Fellow: Gender:           M                  Nurse:              Oumou Arellano Admit Date:       6/6/2022           Sonographer:        [facility] Admission Status: Inpatient -        Additional Staff:                   Routine Height:           182.00 cm          CC Report to:       SICU EMCLocation Weight:           75.00 kg           Study Type:         Transesophageal Echo BSA:              1.96 m2 Blood Pressure: 78 /66 mmHg Diagnosis/ICD: I48.91-Unspecified atrial fibrillation Indication:    Atrial Fibrillation Procedure/CPT: AMANDA Complete-[id number]; Moderate Sedation Services initial 15 minutes                patient >5 years-91741  Study  Detail: The following Echo studies were performed: 2D, Doppler and color               flow. Agitated saline used as a contrast agent for intraseptal               flow evaluation. The patient was sedated. PHYSICIAN INTERPRETATION: AMANDA Details: Technically adequate omniplane transesophageal echocardiogram performed. Agitated saline contrast and color flow Doppler echo was performed to assess for the presence of a patent foramen ovale. AMANDA Medication: The pharynx was anesthetized with Cetacaine spray and viscous lidocaine. The patient was sedated using moderate sedation. Midazolam and Fentanyl was used to sedate the patient for this exam. AMANDA Procedure: The probe was passed without difficulty. The patient tolerated the procedure well without any apparent complications. Left Ventricle: The left ventricular systolic function is severely decreased, with an estimated ejection fraction of 20-25%. There is global hypokinesis of the left ventricle with minor regional variations. The left ventricular cavity size is mildly dilated. Left ventricular diastolic filling was indeterminate. Left Atrium: The left atrium is moderate to severely dilated. There is no definite left atrial thrombus present. A bubble study using agitated saline was performed. Bubble study is negative. There is a normal sized left atrial appendage and there is no thrombus visualized in the left atrial appendage. Right Ventricle: The right ventricle is normal in size. There is moderately reduced right ventricular systolic function. Right Atrium: The right atrium is moderately to severely dilated. Aortic Valve: The aortic valve is trileaflet. There is no evidence of aortic valve regurgitation. Mitral Valve: The mitral valve is mild to moderately thickened. There is mild to moderate mitral valve regurgitation. Tricuspid Valve: The tricuspid valve is structurally normal. There is moderate tricuspid regurgitation. Pulmonic Valve: The pulmonic valve is not well  visualized. There is no indication of pulmonic valve regurgitation. Pericardium: There is a trivial pericardial effusion. Aorta: The aortic root was not well visualized. There is plaque visualized in the descending aorta which is classified as a Grade 2 [mild (focal or diffuse) intimal thickening of 2-3 mm] atherosclerosis. CONCLUSIONS:  1. The left ventricular systolic function is severely decreased with a 20-25% estimated ejection fraction.  2. There is moderately reduced right ventricular systolic function.  3. The left atrium is moderate to severely dilated.  4. The right atrium is moderately to severely dilated.  5. Mild to moderate mitral valve regurgitation.  6. There is moderate tricuspid regurgitation.  7. No left atrial thrombus.  8. There is global hypokinesis of the left ventricle with minor regional variations.  9. There is plaque visualized in the descending aorta. QUANTITATIVE DATA SUMMARY: TRICUSPID VALVE/RVSP:                             Normal Ranges: Peak TR Velocity: 2.36 m/s RV Syst Pressure: 25.3 mmHg (< 30mmHg) [id number] Maninder Toribio MD Electronically signed on 6/8/2022 at 2:57:19 PM     CT HEAD WO IV CONTRAST    Result Date: 6/6/2022  MRN: [id number] Patient Name: XIOMARA FLORES  STUDY: CT HEAD WO CONTRAST;  6/6/2022 10:59 am  INDICATION: AMS .  COMPARISON: None  ACCESSION NUMBER(S): [id number]  ORDERING CLINICIAN: DANII JACOBSON  TECHNIQUE: Examination was performed in the axial plane with sagittal and coronal reconstructions.  FINDINGS: INTRACRANIAL: There is prominence of the ventricular system and cerebral sulci consistent with cerebral atrophy. No mass or mass effect is identified. There is no hemorrhage or subdural fluid collection. There is no acute infarct. There is a small remote infarct involving the posterior limb of the right internal capsule.  EXTRACRANIAL: Visualized paranasal sinuses and mastoids are clear.  IMPRESSION: No acute intracranial pathology.    XR CHEST 1  "VIEW    Result Date: 6/6/2022  MRN: [id number] Patient Name: XIOMARA FLORES  STUDY: CHEST 1 VIEW; ;  6/6/2022 10:20 am  INDICATION: AMS .  COMPARISON: None.  ACCESSION NUMBER(S): [id number]  ORDERING CLINICIAN: DANII JACOBSON  TECHNIQUE: Portable AP upright  FINDINGS: The cardiac silhouette and mediastinal contours are not enlarged. Lungs are clear. No pleural effusions are present. No acute osseous abnormality is identified.  IMPRESSION: No acute radiographic abnormality        EKG:   No results found for: \"EKG\"      Radiology:     No orders to display       Assessment/Plan:         Patient Active Problem List   Diagnosis    Chronic systolic heart failure, ACC/AHA stage C (Multi)    Constipation    Foot drop, left foot    Hypertension    Nonischemic cardiomyopathy (Multi)    Paroxysmal atrial fibrillation with rapid ventricular response (Multi)    Pulmonary hypertension (Multi)    Rheumatoid arthritis (Multi)    Tricuspid regurgitation    Alcohol dependence (Multi)    Alcohol withdrawal syndrome (Multi)    Atrial fibrillation (Multi)    Gout, tophaceous    Thrombocytopenia (CMS-HCC)    BMI 23.0-23.9, adult    Never smoked tobacco    Long term current use of anticoagulant therapy         ASSESSMENT       63-year-old gentleman here for follow-up.     Meds, vitals, examination as noted.     Chart was reviewed in detail including prior cardiac studies and the current echocardiogram and discussed and reviewed with the patient in detail.     Impressions:  Nonischemic cardiomyopathy with current ejection fraction of 50%  Paroxysmal atrial fibrillation with current Jose normal rhythm  Pulmonary hypertension  Essential hypertension controlled  Long-term use of anticoagulation  History of alcohol dependency  Mild MR and TR.  PLAN   Recommendation:  Increase metoprolol to 100 daily  Other meds the same  See me back in December  Call if any problems or issues  At that point we will likely start on Jardiance if patient is " agreeable  Call if any issues or problems otherwise develop

## 2024-08-15 NOTE — PATIENT INSTRUCTIONS
Continue same medications/treatment.  Patient educated on proper medication use.  Patient educated on risk factor modification.  Please bring any lab results from other providers/physicians to your next appointment.    Please bring all medicines, vitamins, and herbal supplements with you when you come to the office.    Prescriptions will not be filled unless you are compliant with your follow up appointments or have a follow up appointment scheduled as per instruction of your physician. Refills should be requested at the time of your visit.    Follow up in December  INCREASE Metoprolol to 100 mg daily.  You can take 2 of the 50 mg tablets until gone    I, FELICITY TORIBIO RN, AM SCRIBING FOR AND IN THE PRESENCE OF DR. RANJIT BROCK, DO, FACC

## 2024-08-29 DIAGNOSIS — R53.83 OTHER FATIGUE: ICD-10-CM

## 2024-09-05 RX ORDER — CALCIUM CARBONATE/VITAMIN D3 600 MG-10
100 TABLET ORAL DAILY
Qty: 30 TABLET | Refills: 0 | Status: SHIPPED | OUTPATIENT
Start: 2024-09-05

## 2024-09-25 ENCOUNTER — APPOINTMENT (OUTPATIENT)
Dept: PRIMARY CARE | Facility: CLINIC | Age: 64
End: 2024-09-25
Payer: MEDICAID

## 2024-09-27 DIAGNOSIS — R53.83 OTHER FATIGUE: ICD-10-CM

## 2024-09-30 ENCOUNTER — APPOINTMENT (OUTPATIENT)
Dept: PRIMARY CARE | Facility: CLINIC | Age: 64
End: 2024-09-30
Payer: MEDICAID

## 2024-09-30 RX ORDER — CALCIUM CARBONATE/VITAMIN D3 600 MG-10
100 TABLET ORAL DAILY
Qty: 30 TABLET | Refills: 10 | Status: SHIPPED | OUTPATIENT
Start: 2024-09-30

## 2024-10-07 DIAGNOSIS — I10 PRIMARY HYPERTENSION: ICD-10-CM

## 2024-10-07 RX ORDER — METOPROLOL SUCCINATE 100 MG/1
100 TABLET, EXTENDED RELEASE ORAL DAILY
Qty: 90 TABLET | Refills: 3 | Status: SHIPPED | OUTPATIENT
Start: 2024-10-07 | End: 2025-10-07

## 2024-10-07 NOTE — TELEPHONE ENCOUNTER
Received request for prescription refill for patient.  Patient follows with Dr. Aron Rice, DO, [facility]     Request is for metoprolol  Is patient currently on medication- yes    Last OV- 8/15/24  Next OV- 1/2/25    Pended for signing and sent to provider.

## 2024-10-08 ENCOUNTER — APPOINTMENT (OUTPATIENT)
Dept: PRIMARY CARE | Facility: CLINIC | Age: 64
End: 2024-10-08
Payer: MEDICAID

## 2024-10-08 NOTE — TELEPHONE ENCOUNTER
Verbal order given to [facility] pharmacy as they did not receive electronic. [phone]

## 2024-10-16 ENCOUNTER — LAB (OUTPATIENT)
Dept: LAB | Facility: LAB | Age: 64
End: 2024-10-16
Payer: MEDICAID

## 2024-10-16 ENCOUNTER — APPOINTMENT (OUTPATIENT)
Dept: PRIMARY CARE | Facility: CLINIC | Age: 64
End: 2024-10-16
Payer: MEDICAID

## 2024-10-16 VITALS
HEIGHT: 72 IN | OXYGEN SATURATION: 100 % | BODY MASS INDEX: 23.11 KG/M2 | SYSTOLIC BLOOD PRESSURE: 134 MMHG | TEMPERATURE: 98.1 F | HEART RATE: 76 BPM | DIASTOLIC BLOOD PRESSURE: 66 MMHG | WEIGHT: 170.6 LBS

## 2024-10-16 DIAGNOSIS — Z12.5 SCREENING FOR PROSTATE CANCER: ICD-10-CM

## 2024-10-16 DIAGNOSIS — Z12.11 COLON CANCER SCREENING: ICD-10-CM

## 2024-10-16 DIAGNOSIS — I10 HYPERTENSION, UNSPECIFIED TYPE: ICD-10-CM

## 2024-10-16 DIAGNOSIS — Z00.00 ADULT WELLNESS VISIT: Primary | ICD-10-CM

## 2024-10-16 DIAGNOSIS — E55.9 VITAMIN D DEFICIENCY: ICD-10-CM

## 2024-10-16 LAB
ALBUMIN SERPL BCP-MCNC: 4.8 G/DL (ref 3.4–5)
ALP SERPL-CCNC: 45 U/L (ref 33–136)
ALT SERPL W P-5'-P-CCNC: 7 U/L (ref 10–52)
ANION GAP SERPL CALC-SCNC: 13 MMOL/L (ref 10–20)
AST SERPL W P-5'-P-CCNC: 10 U/L (ref 9–39)
BASOPHILS # BLD AUTO: 0.03 X10*3/UL (ref 0–0.1)
BASOPHILS NFR BLD AUTO: 0.6 %
BILIRUB SERPL-MCNC: 0.3 MG/DL (ref 0–1.2)
BUN SERPL-MCNC: 14 MG/DL (ref 6–23)
CALCIUM SERPL-MCNC: 9.6 MG/DL (ref 8.6–10.3)
CHLORIDE SERPL-SCNC: 104 MMOL/L (ref 98–107)
CHOLEST SERPL-MCNC: 179 MG/DL (ref 0–199)
CHOLESTEROL/HDL RATIO: 2.8
CO2 SERPL-SCNC: 24 MMOL/L (ref 21–32)
CREAT SERPL-MCNC: 0.88 MG/DL (ref 0.5–1.3)
EGFRCR SERPLBLD CKD-EPI 2021: >90 ML/MIN/1.73M*2
EOSINOPHIL # BLD AUTO: 0.05 X10*3/UL (ref 0–0.7)
EOSINOPHIL NFR BLD AUTO: 0.9 %
ERYTHROCYTE [DISTWIDTH] IN BLOOD BY AUTOMATED COUNT: 16.8 % (ref 11.5–14.5)
GLUCOSE SERPL-MCNC: 100 MG/DL (ref 74–99)
HCT VFR BLD AUTO: 25.9 % (ref 41–52)
HDLC SERPL-MCNC: 64.1 MG/DL
HGB BLD-MCNC: 8 G/DL (ref 13.5–17.5)
IMM GRANULOCYTES # BLD AUTO: 0.02 X10*3/UL (ref 0–0.7)
IMM GRANULOCYTES NFR BLD AUTO: 0.4 % (ref 0–0.9)
LDLC SERPL CALC-MCNC: 97 MG/DL
LYMPHOCYTES # BLD AUTO: 1.18 X10*3/UL (ref 1.2–4.8)
LYMPHOCYTES NFR BLD AUTO: 22.3 %
MCH RBC QN AUTO: 24.5 PG (ref 26–34)
MCHC RBC AUTO-ENTMCNC: 30.9 G/DL (ref 32–36)
MCV RBC AUTO: 79 FL (ref 80–100)
MONOCYTES # BLD AUTO: 0.56 X10*3/UL (ref 0.1–1)
MONOCYTES NFR BLD AUTO: 10.6 %
NEUTROPHILS # BLD AUTO: 3.46 X10*3/UL (ref 1.2–7.7)
NEUTROPHILS NFR BLD AUTO: 65.2 %
NON HDL CHOLESTEROL: 115 MG/DL (ref 0–149)
NRBC BLD-RTO: 0 /100 WBCS (ref 0–0)
PLATELET # BLD AUTO: 233 X10*3/UL (ref 150–450)
POTASSIUM SERPL-SCNC: 4.6 MMOL/L (ref 3.5–5.3)
PROT SERPL-MCNC: 7.2 G/DL (ref 6.4–8.2)
PSA SERPL-MCNC: 0.31 NG/ML
RBC # BLD AUTO: 3.27 X10*6/UL (ref 4.5–5.9)
SODIUM SERPL-SCNC: 136 MMOL/L (ref 136–145)
TRIGL SERPL-MCNC: 92 MG/DL (ref 0–149)
TSH SERPL-ACNC: 1.47 MIU/L (ref 0.44–3.98)
VLDL: 18 MG/DL (ref 0–40)
WBC # BLD AUTO: 5.3 X10*3/UL (ref 4.4–11.3)

## 2024-10-16 PROCEDURE — 85025 COMPLETE CBC W/AUTO DIFF WBC: CPT

## 2024-10-16 PROCEDURE — 3008F BODY MASS INDEX DOCD: CPT | Performed by: PHYSICIAN ASSISTANT

## 2024-10-16 PROCEDURE — 82652 VIT D 1 25-DIHYDROXY: CPT

## 2024-10-16 PROCEDURE — 3078F DIAST BP <80 MM HG: CPT | Performed by: PHYSICIAN ASSISTANT

## 2024-10-16 PROCEDURE — 3075F SYST BP GE 130 - 139MM HG: CPT | Performed by: PHYSICIAN ASSISTANT

## 2024-10-16 PROCEDURE — 84153 ASSAY OF PSA TOTAL: CPT

## 2024-10-16 PROCEDURE — 80053 COMPREHEN METABOLIC PANEL: CPT

## 2024-10-16 PROCEDURE — 36415 COLL VENOUS BLD VENIPUNCTURE: CPT

## 2024-10-16 PROCEDURE — 80061 LIPID PANEL: CPT

## 2024-10-16 PROCEDURE — 1036F TOBACCO NON-USER: CPT | Performed by: PHYSICIAN ASSISTANT

## 2024-10-16 PROCEDURE — 99396 PREV VISIT EST AGE 40-64: CPT | Performed by: PHYSICIAN ASSISTANT

## 2024-10-16 PROCEDURE — 84443 ASSAY THYROID STIM HORMONE: CPT

## 2024-10-16 ASSESSMENT — PATIENT HEALTH QUESTIONNAIRE - PHQ9
SUM OF ALL RESPONSES TO PHQ9 QUESTIONS 1 AND 2: 0
1. LITTLE INTEREST OR PLEASURE IN DOING THINGS: NOT AT ALL
2. FEELING DOWN, DEPRESSED OR HOPELESS: NOT AT ALL

## 2024-10-16 NOTE — PROGRESS NOTES
Subjective   Patient ID: Esvin Gupta is a 63 y.o. male who presents for Annual Exam.    HPI     Here physical   No new concerns   No recent BW  - Lives at home in [place], single  - Employment- unemployeed, disabled (knee and heart)  - Labs: none recent  -PSA-  need  - Colon CA: none,  agreed to cologuard  - Flu: declines, wants to get it at [facility]  - Td: decline  - COVID-19 vax: declines   - Diet: balanced  Weight stable.   - Exercise:  none  - Sexual hx: heterosexual, not active now.   - Tobacco: none  - Illicit drugs: none   - Alcohol:  drinks now, ex-alcoholic  - Dentist visits: not regular  - Eye exams:  not regularly        Pt sees cardiology (Dr Rice) for nonischemic cardiomyopathy and pulmonary hypertension with tricuspid regurg. He is medically managed and currently without sx.     Hypertension- patient is compliant with all antihypertensives and does not complain of any chest pain, shortness of breath, lower extremity edema, or palpitations.  Patient does not check blood pressure at home but here in the office the blood pressure is stable.    Atrial fibrillation-patient is on long-term anticoagulation with Eliquis 5 mg twice daily.  He is also rate controlled with metoprolol 100 mg daily.  He currently denies any symptoms of palpitations or chest pain.    Patient has a history of thrombocytopenia- needs CBC rechecked.     Alcohol abuse- stopped x 1 y.    Rheumatoid arthritis- doesn't see Rheum. Sx are stable.    Gout- no recent attacks since he's stopped drinking alcohol.         Review of Systems  Constitutional: Patient denies any fever, chills, loss of appetite, or unexplained weight loss.  Cardiovascular: Patient denies any chest pain, shortness of breath with exertion, tachycardia, palpitations, orthopnea, or paroxysmal nocturnal dyspnea.  Respiratory: Patient denies any cough, shortness breath, or wheezing.  Gastrointestinal patient denies any nausea, vomiting, diarrhea, constipation, melena,  hematochezia, or reflux symptoms  Skin: Denies any rashes or skin lesions   Neurology: Patient denies any new motor or sensory losses.  Denies any numbness, tingling, weakness, and incoordination of the extremities.  Patient also denies any tremor, seizures, or gait instability.  Endocrinology: Denies any polyuria, polydipsia, polyphagia, or heat/cold intolerance.    Objective   /66   Pulse 76   Temp 36.7 °C (98.1 °F)   Ht 1.829 m (6')   Wt 77.4 kg (170 lb 9.6 oz)   SpO2 100%   BMI 23.14 kg/m²     Physical Exam  Gen. appearance: Alert and cooperative, in no acute distress, well-developed, well-nourished male on a walker  Neck: Supple and without adenopathy or rigidity.  There is no JVD at 90° and no carotid bruits are noted.  There is no thyromegaly, thyroid tenderness, or palpable thyroid nodules.  Heart: Regular rate and rhythm without murmur or ectopy.  Lungs: Lungs are clear to auscultation bilaterally with good air exchange.  Skin: Good turgor, moist, warm and without rashes or lesions.  Neurological exam: Alert and oriented ×3, no tremor, normal gait.  Extremities: No clubbing, cyanosis, or edema    Assessment/Plan   Diagnoses and all orders for this visit:  Adult wellness visit:  Patient has declined any vaccinations today because he has a fear of side effects and he is alone driving.  He states he will do vaccinations at the [facility] closer to his home.  Preventative labs were ordered  inc'l PSA  Cologuard was ordered.  Dental, eye exams were encouraged  Continued abstinence from alcohol was also encouraged    Hypertension, unspecified type  -     Comprehensive Metabolic Panel; Future  -     Lipid Panel; Future  -     TSH with reflex to Free T4 if abnormal; Future  -     CBC and Auto Differential; Future  Patient will continue all current antihypertensives and we will continue to monitor.  Currently blood pressure stable in the office and patient is without side effects to treatment.  Encouraged  patient low salt diet and increase physical activity as tolerated. He is to con't seeing cardiology for management.    Screening for prostate cancer  -     Prostate Specific Antigen, Screen; Future  Colon cancer screening  -     Cologuard® colon cancer screening; Future  Vitamin D deficiency  -     Vitamin D 1,25 Dihydroxy (for eval of hypercalcemia); Future  Patient is currently not on supplementation    Patient is to return to the clinic in 1 year or sooner depending on labs.  Labs are to be done today.    Patient understands that should they have testing outside   facilities that we may not receive the results and was told to call us if they have not heard from our office within a week after testing.    [facility] uses voice recognition technology for dictations. Sometimes the software misinterprets words. Please take this into account when reading this.

## 2024-10-18 LAB — 1,25(OH)2D SERPL-MCNC: 45.5 PG/ML (ref 19.9–79.3)

## 2025-01-02 ENCOUNTER — APPOINTMENT (OUTPATIENT)
Dept: CARDIOLOGY | Facility: CLINIC | Age: 65
End: 2025-01-02
Payer: MEDICAID

## 2025-02-18 DIAGNOSIS — I42.8 NONISCHEMIC CARDIOMYOPATHY (MULTI): ICD-10-CM

## 2025-02-18 DIAGNOSIS — R53.83 OTHER FATIGUE: ICD-10-CM

## 2025-02-18 DIAGNOSIS — I50.22 CHRONIC SYSTOLIC HEART FAILURE, ACC/AHA STAGE C: ICD-10-CM

## 2025-02-18 DIAGNOSIS — I10 HYPERTENSION, UNSPECIFIED TYPE: ICD-10-CM

## 2025-02-18 NOTE — LETTER
March 10, 2025     Esvin Gupta  [address]  [place] [address]    Dear Esvin Gupta,       We have been trying to contact you regarding a prescription refill request but have not been successful. It has been determined that a follow up with your Cardiologist is necessary so these medications can be sent to your pharmacy.      Please contact our office at [phone] and use prompt #3 for the scheduling department so that we can schedule an appointment for you and process your requested prescriptions.            In the interest of your health,      [facility] ([place])

## 2025-02-19 RX ORDER — LANOLIN ALCOHOL/MO/W.PET/CERES
1 CREAM (GRAM) TOPICAL 2 TIMES DAILY
Qty: 180 TABLET | Refills: 0 | Status: SHIPPED | OUTPATIENT
Start: 2025-02-19

## 2025-02-24 ENCOUNTER — HOSPITAL ENCOUNTER (INPATIENT)
Facility: HOSPITAL | Age: 65
End: 2025-02-24
Attending: STUDENT IN AN ORGANIZED HEALTH CARE EDUCATION/TRAINING PROGRAM | Admitting: INTERNAL MEDICINE
Payer: MEDICAID

## 2025-02-24 ENCOUNTER — APPOINTMENT (OUTPATIENT)
Dept: CARDIOLOGY | Facility: HOSPITAL | Age: 65
End: 2025-02-24
Payer: MEDICAID

## 2025-02-24 ENCOUNTER — APPOINTMENT (OUTPATIENT)
Dept: RADIOLOGY | Facility: HOSPITAL | Age: 65
End: 2025-02-24
Payer: MEDICAID

## 2025-02-24 DIAGNOSIS — R57.1 HYPOVOLEMIC SHOCK (MULTI): ICD-10-CM

## 2025-02-24 DIAGNOSIS — I48.0 PAROXYSMAL ATRIAL FIBRILLATION WITH RAPID VENTRICULAR RESPONSE (MULTI): ICD-10-CM

## 2025-02-24 DIAGNOSIS — N17.9 ACUTE RENAL FAILURE, UNSPECIFIED ACUTE RENAL FAILURE TYPE (CMS-HCC): Primary | ICD-10-CM

## 2025-02-24 DIAGNOSIS — L03.311 CELLULITIS OF ABDOMINAL WALL: ICD-10-CM

## 2025-02-24 DIAGNOSIS — I50.20 UNSPECIFIED SYSTOLIC (CONGESTIVE) HEART FAILURE: ICD-10-CM

## 2025-02-24 DIAGNOSIS — E87.5 HYPERKALEMIA: ICD-10-CM

## 2025-02-24 DIAGNOSIS — I50.22 CHRONIC SYSTOLIC HEART FAILURE, ACC/AHA STAGE C: ICD-10-CM

## 2025-02-24 DIAGNOSIS — I50.30 UNSPECIFIED DIASTOLIC (CONGESTIVE) HEART FAILURE: ICD-10-CM

## 2025-02-24 DIAGNOSIS — E87.1 HYPONATREMIA: ICD-10-CM

## 2025-02-24 LAB
ALBUMIN SERPL BCP-MCNC: 3.9 G/DL (ref 3.4–5)
ALP SERPL-CCNC: 188 U/L (ref 33–136)
ALT SERPL W P-5'-P-CCNC: 18 U/L (ref 10–52)
ANION GAP BLDV CALCULATED.4IONS-SCNC: 18 MMOL/L (ref 10–25)
ANION GAP SERPL CALC-SCNC: 23 MMOL/L (ref 10–20)
ANION GAP SERPL CALC-SCNC: 23 MMOL/L (ref 10–20)
APPEARANCE UR: CLEAR
AST SERPL W P-5'-P-CCNC: 11 U/L (ref 9–39)
BASE EXCESS BLDV CALC-SCNC: -6.3 MMOL/L (ref -2–3)
BASOPHILS # BLD AUTO: 0.02 X10*3/UL (ref 0–0.1)
BASOPHILS NFR BLD AUTO: 0.2 %
BILIRUB SERPL-MCNC: 0.4 MG/DL (ref 0–1.2)
BILIRUB UR STRIP.AUTO-MCNC: NEGATIVE MG/DL
BODY TEMPERATURE: 37 DEGREES CELSIUS
BUN SERPL-MCNC: 102 MG/DL (ref 6–23)
BUN SERPL-MCNC: 120 MG/DL (ref 6–23)
CA-I BLDV-SCNC: 1.21 MMOL/L (ref 1.1–1.33)
CALCIUM SERPL-MCNC: 10.3 MG/DL (ref 8.6–10.3)
CALCIUM SERPL-MCNC: 9.8 MG/DL (ref 8.6–10.3)
CHLORIDE BLDV-SCNC: 91 MMOL/L (ref 98–107)
CHLORIDE SERPL-SCNC: 84 MMOL/L (ref 98–107)
CHLORIDE SERPL-SCNC: 88 MMOL/L (ref 98–107)
CO2 SERPL-SCNC: 18 MMOL/L (ref 21–32)
CO2 SERPL-SCNC: 19 MMOL/L (ref 21–32)
COLOR UR: ABNORMAL
CREAT SERPL-MCNC: 4.72 MG/DL (ref 0.5–1.3)
CREAT SERPL-MCNC: 5.62 MG/DL (ref 0.5–1.3)
CRITICAL CALL TIME: 1342
CRITICAL CALLED BY: ABNORMAL
CRITICAL CALLED TO: ABNORMAL
CRITICAL READ BACK: ABNORMAL
EGFRCR SERPLBLD CKD-EPI 2021: 11 ML/MIN/1.73M*2
EGFRCR SERPLBLD CKD-EPI 2021: 13 ML/MIN/1.73M*2
EOSINOPHIL # BLD AUTO: 0.08 X10*3/UL (ref 0–0.7)
EOSINOPHIL NFR BLD AUTO: 0.8 %
ERYTHROCYTE [DISTWIDTH] IN BLOOD BY AUTOMATED COUNT: 16.3 % (ref 11.5–14.5)
GLUCOSE BLD MANUAL STRIP-MCNC: 105 MG/DL (ref 74–99)
GLUCOSE BLDV-MCNC: 98 MG/DL (ref 74–99)
GLUCOSE SERPL-MCNC: 112 MG/DL (ref 74–99)
GLUCOSE SERPL-MCNC: 113 MG/DL (ref 74–99)
GLUCOSE UR STRIP.AUTO-MCNC: ABNORMAL MG/DL
HCO3 BLDV-SCNC: 19 MMOL/L (ref 22–26)
HCT VFR BLD AUTO: 27.6 % (ref 41–52)
HCT VFR BLD EST: 24 % (ref 41–52)
HGB BLD-MCNC: 9.5 G/DL (ref 13.5–17.5)
HGB BLDV-MCNC: 7.9 G/DL (ref 13.5–17.5)
IMM GRANULOCYTES # BLD AUTO: 0.08 X10*3/UL (ref 0–0.7)
IMM GRANULOCYTES NFR BLD AUTO: 0.8 % (ref 0–0.9)
INHALED O2 CONCENTRATION: 21 %
KETONES UR STRIP.AUTO-MCNC: NEGATIVE MG/DL
LACTATE BLDV-SCNC: 2 MMOL/L (ref 0.4–2)
LACTATE SERPL-SCNC: 0.7 MMOL/L (ref 0.4–2)
LEUKOCYTE ESTERASE UR QL STRIP.AUTO: ABNORMAL
LYMPHOCYTES # BLD AUTO: 2.9 X10*3/UL (ref 1.2–4.8)
LYMPHOCYTES NFR BLD AUTO: 29.4 %
MAGNESIUM SERPL-MCNC: 2.57 MG/DL (ref 1.6–2.4)
MCH RBC QN AUTO: 27.4 PG (ref 26–34)
MCHC RBC AUTO-ENTMCNC: 34.4 G/DL (ref 32–36)
MCV RBC AUTO: 80 FL (ref 80–100)
MONOCYTES # BLD AUTO: 1.15 X10*3/UL (ref 0.1–1)
MONOCYTES NFR BLD AUTO: 11.6 %
NEUTROPHILS # BLD AUTO: 5.65 X10*3/UL (ref 1.2–7.7)
NEUTROPHILS NFR BLD AUTO: 57.2 %
NITRITE UR QL STRIP.AUTO: NEGATIVE
NRBC BLD-RTO: 0 /100 WBCS (ref 0–0)
OXYHGB MFR BLDV: 30.9 % (ref 45–75)
PCO2 BLDV: 36 MM HG (ref 41–51)
PH BLDV: 7.33 PH (ref 7.33–7.43)
PH UR STRIP.AUTO: 7 [PH]
PLATELET # BLD AUTO: 457 X10*3/UL (ref 150–450)
PO2 BLDV: 29 MM HG (ref 35–45)
POTASSIUM BLDV-SCNC: 6.8 MMOL/L (ref 3.5–5.3)
POTASSIUM SERPL-SCNC: 6.1 MMOL/L (ref 3.5–5.3)
POTASSIUM SERPL-SCNC: 7.3 MMOL/L (ref 3.5–5.3)
PROT SERPL-MCNC: 7.8 G/DL (ref 6.4–8.2)
PROT UR STRIP.AUTO-MCNC: ABNORMAL MG/DL
RBC # BLD AUTO: 3.47 X10*6/UL (ref 4.5–5.9)
RBC # UR STRIP.AUTO: NEGATIVE MG/DL
RBC #/AREA URNS AUTO: ABNORMAL /HPF
SAO2 % BLDV: 32 % (ref 45–75)
SODIUM BLDV-SCNC: 121 MMOL/L (ref 136–145)
SODIUM SERPL-SCNC: 118 MMOL/L (ref 136–145)
SODIUM SERPL-SCNC: 124 MMOL/L (ref 136–145)
SP GR UR STRIP.AUTO: 1.01
UROBILINOGEN UR STRIP.AUTO-MCNC: NORMAL MG/DL
WBC # BLD AUTO: 9.9 X10*3/UL (ref 4.4–11.3)
WBC #/AREA URNS AUTO: ABNORMAL /HPF

## 2025-02-24 PROCEDURE — 96375 TX/PRO/DX INJ NEW DRUG ADDON: CPT

## 2025-02-24 PROCEDURE — 83605 ASSAY OF LACTIC ACID: CPT | Performed by: STUDENT IN AN ORGANIZED HEALTH CARE EDUCATION/TRAINING PROGRAM

## 2025-02-24 PROCEDURE — 93005 ELECTROCARDIOGRAM TRACING: CPT

## 2025-02-24 PROCEDURE — 2500000005 HC RX 250 GENERAL PHARMACY W/O HCPCS: Performed by: STUDENT IN AN ORGANIZED HEALTH CARE EDUCATION/TRAINING PROGRAM

## 2025-02-24 PROCEDURE — 36415 COLL VENOUS BLD VENIPUNCTURE: CPT | Performed by: STUDENT IN AN ORGANIZED HEALTH CARE EDUCATION/TRAINING PROGRAM

## 2025-02-24 PROCEDURE — 93010 ELECTROCARDIOGRAM REPORT: CPT | Performed by: STUDENT IN AN ORGANIZED HEALTH CARE EDUCATION/TRAINING PROGRAM

## 2025-02-24 PROCEDURE — 82947 ASSAY GLUCOSE BLOOD QUANT: CPT

## 2025-02-24 PROCEDURE — 71045 X-RAY EXAM CHEST 1 VIEW: CPT | Performed by: STUDENT IN AN ORGANIZED HEALTH CARE EDUCATION/TRAINING PROGRAM

## 2025-02-24 PROCEDURE — 99222 1ST HOSP IP/OBS MODERATE 55: CPT | Performed by: SURGERY

## 2025-02-24 PROCEDURE — 84132 ASSAY OF SERUM POTASSIUM: CPT | Performed by: STUDENT IN AN ORGANIZED HEALTH CARE EDUCATION/TRAINING PROGRAM

## 2025-02-24 PROCEDURE — 99291 CRITICAL CARE FIRST HOUR: CPT | Mod: 25 | Performed by: STUDENT IN AN ORGANIZED HEALTH CARE EDUCATION/TRAINING PROGRAM

## 2025-02-24 PROCEDURE — 87086 URINE CULTURE/COLONY COUNT: CPT | Mod: PARLAB | Performed by: STUDENT IN AN ORGANIZED HEALTH CARE EDUCATION/TRAINING PROGRAM

## 2025-02-24 PROCEDURE — 85025 COMPLETE CBC W/AUTO DIFF WBC: CPT | Performed by: STUDENT IN AN ORGANIZED HEALTH CARE EDUCATION/TRAINING PROGRAM

## 2025-02-24 PROCEDURE — 96366 THER/PROPH/DIAG IV INF ADDON: CPT

## 2025-02-24 PROCEDURE — 2500000004 HC RX 250 GENERAL PHARMACY W/ HCPCS (ALT 636 FOR OP/ED): Performed by: STUDENT IN AN ORGANIZED HEALTH CARE EDUCATION/TRAINING PROGRAM

## 2025-02-24 PROCEDURE — 3E033XZ INTRODUCTION OF VASOPRESSOR INTO PERIPHERAL VEIN, PERCUTANEOUS APPROACH: ICD-10-PCS | Performed by: STUDENT IN AN ORGANIZED HEALTH CARE EDUCATION/TRAINING PROGRAM

## 2025-02-24 PROCEDURE — 96367 TX/PROPH/DG ADDL SEQ IV INF: CPT

## 2025-02-24 PROCEDURE — 2500000001 HC RX 250 WO HCPCS SELF ADMINISTERED DRUGS (ALT 637 FOR MEDICARE OP)

## 2025-02-24 PROCEDURE — 81001 URINALYSIS AUTO W/SCOPE: CPT | Performed by: STUDENT IN AN ORGANIZED HEALTH CARE EDUCATION/TRAINING PROGRAM

## 2025-02-24 PROCEDURE — 80053 COMPREHEN METABOLIC PANEL: CPT | Performed by: STUDENT IN AN ORGANIZED HEALTH CARE EDUCATION/TRAINING PROGRAM

## 2025-02-24 PROCEDURE — 2020000001 HC ICU ROOM DAILY

## 2025-02-24 PROCEDURE — 84132 ASSAY OF SERUM POTASSIUM: CPT

## 2025-02-24 PROCEDURE — 2500000002 HC RX 250 W HCPCS SELF ADMINISTERED DRUGS (ALT 637 FOR MEDICARE OP, ALT 636 FOR OP/ED): Performed by: STUDENT IN AN ORGANIZED HEALTH CARE EDUCATION/TRAINING PROGRAM

## 2025-02-24 PROCEDURE — 71045 X-RAY EXAM CHEST 1 VIEW: CPT

## 2025-02-24 PROCEDURE — 71045 X-RAY EXAM CHEST 1 VIEW: CPT | Performed by: RADIOLOGY

## 2025-02-24 PROCEDURE — 99254 IP/OBS CNSLTJ NEW/EST MOD 60: CPT | Performed by: REGISTERED NURSE

## 2025-02-24 PROCEDURE — 83735 ASSAY OF MAGNESIUM: CPT | Performed by: REGISTERED NURSE

## 2025-02-24 PROCEDURE — 94640 AIRWAY INHALATION TREATMENT: CPT

## 2025-02-24 PROCEDURE — 96365 THER/PROPH/DIAG IV INF INIT: CPT

## 2025-02-24 PROCEDURE — 82533 TOTAL CORTISOL: CPT | Mod: PARLAB

## 2025-02-24 PROCEDURE — 87040 BLOOD CULTURE FOR BACTERIA: CPT | Mod: PARLAB | Performed by: STUDENT IN AN ORGANIZED HEALTH CARE EDUCATION/TRAINING PROGRAM

## 2025-02-24 PROCEDURE — 2500000004 HC RX 250 GENERAL PHARMACY W/ HCPCS (ALT 636 FOR OP/ED)

## 2025-02-24 PROCEDURE — 99291 CRITICAL CARE FIRST HOUR: CPT

## 2025-02-24 RX ORDER — METOPROLOL TARTRATE 25 MG/1
25 TABLET, FILM COATED ORAL 2 TIMES DAILY
Status: DISCONTINUED | OUTPATIENT
Start: 2025-02-24 | End: 2025-03-03

## 2025-02-24 RX ORDER — ACETAMINOPHEN 325 MG/1
650 TABLET ORAL EVERY 4 HOURS PRN
Status: DISCONTINUED | OUTPATIENT
Start: 2025-02-24 | End: 2025-03-06 | Stop reason: HOSPADM

## 2025-02-24 RX ORDER — NYSTATIN 100000 [USP'U]/G
1 POWDER TOPICAL 2 TIMES DAILY
Status: DISCONTINUED | OUTPATIENT
Start: 2025-02-24 | End: 2025-03-06 | Stop reason: HOSPADM

## 2025-02-24 RX ORDER — PANTOPRAZOLE SODIUM 40 MG/1
40 TABLET, DELAYED RELEASE ORAL
COMMUNITY
Start: 2025-02-17

## 2025-02-24 RX ORDER — DEXTROSE 40 %
15 GEL (GRAM) ORAL EVERY 2 HOUR PRN
COMMUNITY
Start: 2025-02-17

## 2025-02-24 RX ORDER — DEXTROSE 50 % IN WATER (D50W) INTRAVENOUS SYRINGE
25 ONCE
Status: COMPLETED | OUTPATIENT
Start: 2025-02-24 | End: 2025-02-24

## 2025-02-24 RX ORDER — INSULIN LISPRO 100 [IU]/ML
0-6 INJECTION, SOLUTION INTRAVENOUS; SUBCUTANEOUS 2 TIMES DAILY
COMMUNITY
Start: 2025-02-17

## 2025-02-24 RX ORDER — ACETAMINOPHEN 650 MG/1
650 SUPPOSITORY RECTAL EVERY 4 HOURS PRN
Status: DISCONTINUED | OUTPATIENT
Start: 2025-02-24 | End: 2025-03-06 | Stop reason: HOSPADM

## 2025-02-24 RX ORDER — ALBUTEROL SULFATE 2.5 MG/.5ML
10 SOLUTION RESPIRATORY (INHALATION) ONCE
Status: COMPLETED | OUTPATIENT
Start: 2025-02-24 | End: 2025-02-24

## 2025-02-24 RX ORDER — MORPHINE SULFATE 2 MG/ML
2 INJECTION, SOLUTION INTRAMUSCULAR; INTRAVENOUS EVERY 6 HOURS PRN
Status: DISCONTINUED | OUTPATIENT
Start: 2025-02-24 | End: 2025-02-27

## 2025-02-24 RX ORDER — SODIUM POLYSTYRENE SULFONATE 15 G/60ML
15 SUSPENSION ORAL; RECTAL ONCE
Status: DISCONTINUED | OUTPATIENT
Start: 2025-02-24 | End: 2025-02-27

## 2025-02-24 RX ORDER — TAMSULOSIN HYDROCHLORIDE 0.4 MG/1
0.4 CAPSULE ORAL DAILY
Status: DISCONTINUED | OUTPATIENT
Start: 2025-02-24 | End: 2025-03-06 | Stop reason: HOSPADM

## 2025-02-24 RX ORDER — IPRATROPIUM BROMIDE AND ALBUTEROL SULFATE 2.5; .5 MG/3ML; MG/3ML
3 SOLUTION RESPIRATORY (INHALATION)
COMMUNITY
Start: 2025-02-22

## 2025-02-24 RX ORDER — ONDANSETRON 4 MG/1
4 TABLET, FILM COATED ORAL EVERY 8 HOURS PRN
Status: DISCONTINUED | OUTPATIENT
Start: 2025-02-24 | End: 2025-03-06 | Stop reason: HOSPADM

## 2025-02-24 RX ORDER — SACUBITRIL AND VALSARTAN 97; 103 MG/1; MG/1
1 TABLET, FILM COATED ORAL 2 TIMES DAILY
Status: DISCONTINUED | OUTPATIENT
Start: 2025-02-24 | End: 2025-03-01

## 2025-02-24 RX ORDER — INSULIN LISPRO 100 [IU]/ML
0-5 INJECTION, SOLUTION INTRAVENOUS; SUBCUTANEOUS
Status: DISCONTINUED | OUTPATIENT
Start: 2025-02-24 | End: 2025-03-06 | Stop reason: HOSPADM

## 2025-02-24 RX ORDER — TAMSULOSIN HYDROCHLORIDE 0.4 MG/1
0.4 CAPSULE ORAL DAILY
COMMUNITY
Start: 2025-02-17

## 2025-02-24 RX ORDER — SODIUM BICARBONATE 1 MEQ/ML
50 SYRINGE (ML) INTRAVENOUS ONCE
Status: COMPLETED | OUTPATIENT
Start: 2025-02-24 | End: 2025-02-24

## 2025-02-24 RX ORDER — CEPHALEXIN 500 MG/1
500 CAPSULE ORAL EVERY 6 HOURS
COMMUNITY
Start: 2025-02-23 | End: 2025-03-06 | Stop reason: HOSPADM

## 2025-02-24 RX ORDER — METOPROLOL TARTRATE 25 MG/1
25 TABLET, FILM COATED ORAL 2 TIMES DAILY
COMMUNITY
Start: 2025-02-17 | End: 2025-03-06 | Stop reason: HOSPADM

## 2025-02-24 RX ORDER — NOREPINEPHRINE BITARTRATE 0.03 MG/ML
0-1 INJECTION, SOLUTION INTRAVENOUS CONTINUOUS
Status: DISCONTINUED | OUTPATIENT
Start: 2025-02-24 | End: 2025-02-27

## 2025-02-24 RX ORDER — CALCIUM GLUCONATE 20 MG/ML
2 INJECTION, SOLUTION INTRAVENOUS ONCE
Status: COMPLETED | OUTPATIENT
Start: 2025-02-24 | End: 2025-02-24

## 2025-02-24 RX ORDER — SODIUM CHLORIDE 9 MG/ML
100 INJECTION, SOLUTION INTRAVENOUS CONTINUOUS
Status: DISCONTINUED | OUTPATIENT
Start: 2025-02-24 | End: 2025-02-25

## 2025-02-24 RX ORDER — VANCOMYCIN HYDROCHLORIDE 1 G/20ML
INJECTION, POWDER, LYOPHILIZED, FOR SOLUTION INTRAVENOUS DAILY PRN
Status: DISCONTINUED | OUTPATIENT
Start: 2025-02-24 | End: 2025-02-27

## 2025-02-24 RX ORDER — NITROGLYCERIN 0.4 MG/1
0.4 TABLET SUBLINGUAL EVERY 5 MIN PRN
Status: DISCONTINUED | OUTPATIENT
Start: 2025-02-24 | End: 2025-02-27

## 2025-02-24 RX ORDER — IBUPROFEN 200 MG
4 TABLET ORAL EVERY 2 HOUR PRN
COMMUNITY
Start: 2025-02-17 | End: 2025-03-06 | Stop reason: HOSPADM

## 2025-02-24 RX ORDER — SODIUM BICARBONATE 650 MG/1
1300 TABLET ORAL 2 TIMES DAILY
COMMUNITY
Start: 2025-02-17

## 2025-02-24 RX ORDER — NITROGLYCERIN 0.4 MG/1
0.4 TABLET SUBLINGUAL EVERY 5 MIN PRN
COMMUNITY
Start: 2025-02-17

## 2025-02-24 RX ORDER — PANTOPRAZOLE SODIUM 40 MG/10ML
40 INJECTION, POWDER, LYOPHILIZED, FOR SOLUTION INTRAVENOUS DAILY
Status: DISCONTINUED | OUTPATIENT
Start: 2025-02-24 | End: 2025-03-06 | Stop reason: HOSPADM

## 2025-02-24 RX ORDER — IPRATROPIUM BROMIDE AND ALBUTEROL SULFATE 2.5; .5 MG/3ML; MG/3ML
3 SOLUTION RESPIRATORY (INHALATION) 4 TIMES DAILY PRN
COMMUNITY
Start: 2025-02-20 | End: 2025-03-06 | Stop reason: HOSPADM

## 2025-02-24 RX ORDER — ACETAMINOPHEN 160 MG/5ML
650 SOLUTION ORAL EVERY 4 HOURS PRN
Status: DISCONTINUED | OUTPATIENT
Start: 2025-02-24 | End: 2025-03-06 | Stop reason: HOSPADM

## 2025-02-24 RX ORDER — TRAMADOL HYDROCHLORIDE 50 MG/1
50 TABLET ORAL EVERY 8 HOURS PRN
COMMUNITY
Start: 2025-02-17 | End: 2025-03-06 | Stop reason: HOSPADM

## 2025-02-24 RX ORDER — IPRATROPIUM BROMIDE AND ALBUTEROL SULFATE 2.5; .5 MG/3ML; MG/3ML
3 SOLUTION RESPIRATORY (INHALATION) EVERY 2 HOUR PRN
Status: DISCONTINUED | OUTPATIENT
Start: 2025-02-24 | End: 2025-03-06 | Stop reason: HOSPADM

## 2025-02-24 RX ORDER — ONDANSETRON HYDROCHLORIDE 2 MG/ML
4 INJECTION, SOLUTION INTRAVENOUS EVERY 8 HOURS PRN
Status: DISCONTINUED | OUTPATIENT
Start: 2025-02-24 | End: 2025-03-06 | Stop reason: HOSPADM

## 2025-02-24 RX ADMIN — SODIUM BICARBONATE 50 MEQ: 84 INJECTION INTRAVENOUS at 13:39

## 2025-02-24 RX ADMIN — PIPERACILLIN SODIUM AND TAZOBACTAM SODIUM 4.5 G: 4; .5 INJECTION, SOLUTION INTRAVENOUS at 12:00

## 2025-02-24 RX ADMIN — SODIUM CHLORIDE 100 ML/HR: 9 INJECTION, SOLUTION INTRAVENOUS at 18:28

## 2025-02-24 RX ADMIN — INSULIN HUMAN 5 UNITS: 100 INJECTION, SOLUTION PARENTERAL at 13:39

## 2025-02-24 RX ADMIN — CEFEPIME 1 G: 1 INJECTION, POWDER, FOR SOLUTION INTRAMUSCULAR; INTRAVENOUS at 18:28

## 2025-02-24 RX ADMIN — SODIUM BICARBONATE 100 ML/HR: 84 INJECTION, SOLUTION INTRAVENOUS at 14:22

## 2025-02-24 RX ADMIN — SODIUM CHLORIDE, POTASSIUM CHLORIDE, SODIUM LACTATE AND CALCIUM CHLORIDE 1000 ML: 600; 310; 30; 20 INJECTION, SOLUTION INTRAVENOUS at 13:57

## 2025-02-24 RX ADMIN — NYSTATIN 1 APPLICATION: 100000 POWDER TOPICAL at 21:25

## 2025-02-24 RX ADMIN — VANCOMYCIN HYDROCHLORIDE 1.5 G: 1.5 INJECTION, POWDER, LYOPHILIZED, FOR SOLUTION INTRAVENOUS at 13:56

## 2025-02-24 RX ADMIN — ACETAMINOPHEN 650 MG: 325 TABLET, FILM COATED ORAL at 20:12

## 2025-02-24 RX ADMIN — SODIUM CHLORIDE, POTASSIUM CHLORIDE, SODIUM LACTATE AND CALCIUM CHLORIDE 1000 ML: 600; 310; 30; 20 INJECTION, SOLUTION INTRAVENOUS at 12:01

## 2025-02-24 RX ADMIN — SODIUM CHLORIDE, POTASSIUM CHLORIDE, SODIUM LACTATE AND CALCIUM CHLORIDE 1000 ML: 600; 310; 30; 20 INJECTION, SOLUTION INTRAVENOUS at 22:11

## 2025-02-24 RX ADMIN — DEXTROSE MONOHYDRATE 25 G: 25 INJECTION, SOLUTION INTRAVENOUS at 13:39

## 2025-02-24 RX ADMIN — CALCIUM GLUCONATE 2 G: 20 INJECTION, SOLUTION INTRAVENOUS at 13:39

## 2025-02-24 RX ADMIN — ALBUTEROL SULFATE 10 MG: 2.5 SOLUTION RESPIRATORY (INHALATION) at 13:30

## 2025-02-24 RX ADMIN — NOREPINEPHRINE BITARTRATE 0.01 MCG/KG/MIN: 32 SOLUTION INTRAVENOUS at 16:01

## 2025-02-24 SDOH — ECONOMIC STABILITY: INCOME INSECURITY: IN THE PAST 12 MONTHS HAS THE ELECTRIC, GAS, OIL, OR WATER COMPANY THREATENED TO SHUT OFF SERVICES IN YOUR HOME?: NO

## 2025-02-24 SDOH — SOCIAL STABILITY: SOCIAL INSECURITY: ABUSE: ADULT

## 2025-02-24 SDOH — ECONOMIC STABILITY: HOUSING INSECURITY: AT ANY TIME IN THE PAST 12 MONTHS, WERE YOU HOMELESS OR LIVING IN A SHELTER (INCLUDING NOW)?: NO

## 2025-02-24 SDOH — SOCIAL STABILITY: SOCIAL INSECURITY
WITHIN THE LAST YEAR, HAVE YOU BEEN KICKED, HIT, SLAPPED, OR OTHERWISE PHYSICALLY HURT BY YOUR PARTNER OR EX-PARTNER?: NO

## 2025-02-24 SDOH — SOCIAL STABILITY: SOCIAL INSECURITY: WITHIN THE LAST YEAR, HAVE YOU BEEN AFRAID OF YOUR PARTNER OR EX-PARTNER?: NO

## 2025-02-24 SDOH — SOCIAL STABILITY: SOCIAL INSECURITY
WITHIN THE LAST YEAR, HAVE YOU BEEN RAPED OR FORCED TO HAVE ANY KIND OF SEXUAL ACTIVITY BY YOUR PARTNER OR EX-PARTNER?: NO

## 2025-02-24 SDOH — SOCIAL STABILITY: SOCIAL INSECURITY: DO YOU FEEL ANYONE HAS EXPLOITED OR TAKEN ADVANTAGE OF YOU FINANCIALLY OR OF YOUR PERSONAL PROPERTY?: NO

## 2025-02-24 SDOH — SOCIAL STABILITY: SOCIAL INSECURITY: WITHIN THE LAST YEAR, HAVE YOU BEEN HUMILIATED OR EMOTIONALLY ABUSED IN OTHER WAYS BY YOUR PARTNER OR EX-PARTNER?: NO

## 2025-02-24 SDOH — SOCIAL STABILITY: SOCIAL INSECURITY: DOES ANYONE TRY TO KEEP YOU FROM HAVING/CONTACTING OTHER FRIENDS OR DOING THINGS OUTSIDE YOUR HOME?: NO

## 2025-02-24 SDOH — SOCIAL STABILITY: SOCIAL INSECURITY: HAVE YOU HAD THOUGHTS OF HARMING ANYONE ELSE?: NO

## 2025-02-24 SDOH — SOCIAL STABILITY: SOCIAL INSECURITY: ARE YOU OR HAVE YOU BEEN THREATENED OR ABUSED PHYSICALLY, EMOTIONALLY, OR SEXUALLY BY ANYONE?: NO

## 2025-02-24 SDOH — SOCIAL STABILITY: SOCIAL INSECURITY: ARE THERE ANY APPARENT SIGNS OF INJURIES/BEHAVIORS THAT COULD BE RELATED TO ABUSE/NEGLECT?: YES

## 2025-02-24 SDOH — ECONOMIC STABILITY: HOUSING INSECURITY: IN THE LAST 12 MONTHS, WAS THERE A TIME WHEN YOU WERE NOT ABLE TO PAY THE MORTGAGE OR RENT ON TIME?: NO

## 2025-02-24 SDOH — ECONOMIC STABILITY: TRANSPORTATION INSECURITY: IN THE PAST 12 MONTHS, HAS LACK OF TRANSPORTATION KEPT YOU FROM MEDICAL APPOINTMENTS OR FROM GETTING MEDICATIONS?: NO

## 2025-02-24 SDOH — SOCIAL STABILITY: SOCIAL INSECURITY: HAVE YOU HAD ANY THOUGHTS OF HARMING ANYONE ELSE?: NO

## 2025-02-24 SDOH — ECONOMIC STABILITY: FOOD INSECURITY: HOW HARD IS IT FOR YOU TO PAY FOR THE VERY BASICS LIKE FOOD, HOUSING, MEDICAL CARE, AND HEATING?: NOT VERY HARD

## 2025-02-24 SDOH — SOCIAL STABILITY: SOCIAL INSECURITY: HAS ANYONE EVER THREATENED TO HURT YOUR FAMILY OR YOUR PETS?: NO

## 2025-02-24 SDOH — SOCIAL STABILITY: SOCIAL INSECURITY: DO YOU FEEL UNSAFE GOING BACK TO THE PLACE WHERE YOU ARE LIVING?: NO

## 2025-02-24 SDOH — ECONOMIC STABILITY: HOUSING INSECURITY: IN THE PAST 12 MONTHS, HOW MANY TIMES HAVE YOU MOVED WHERE YOU WERE LIVING?: 1

## 2025-02-24 SDOH — SOCIAL STABILITY: SOCIAL INSECURITY: WERE YOU ABLE TO COMPLETE ALL THE BEHAVIORAL HEALTH SCREENINGS?: YES

## 2025-02-24 ASSESSMENT — ACTIVITIES OF DAILY LIVING (ADL)
ADEQUATE_TO_COMPLETE_ADL: YES
HEARING - RIGHT EAR: FUNCTIONAL
TOILETING: NEEDS ASSISTANCE
WALKS IN HOME: NEEDS ASSISTANCE
HEARING - LEFT EAR: FUNCTIONAL
ASSISTIVE_DEVICE: WHEELCHAIR
DRESSING YOURSELF: NEEDS ASSISTANCE
BATHING: NEEDS ASSISTANCE
PATIENT'S MEMORY ADEQUATE TO SAFELY COMPLETE DAILY ACTIVITIES?: YES
LACK_OF_TRANSPORTATION: NO
GROOMING: NEEDS ASSISTANCE
LACK_OF_TRANSPORTATION: NO
FEEDING YOURSELF: NEEDS ASSISTANCE
JUDGMENT_ADEQUATE_SAFELY_COMPLETE_DAILY_ACTIVITIES: YES

## 2025-02-24 ASSESSMENT — COLUMBIA-SUICIDE SEVERITY RATING SCALE - C-SSRS
6. HAVE YOU EVER DONE ANYTHING, STARTED TO DO ANYTHING, OR PREPARED TO DO ANYTHING TO END YOUR LIFE?: NO
2. HAVE YOU ACTUALLY HAD ANY THOUGHTS OF KILLING YOURSELF?: NO
1. IN THE PAST MONTH, HAVE YOU WISHED YOU WERE DEAD OR WISHED YOU COULD GO TO SLEEP AND NOT WAKE UP?: NO
2. HAVE YOU ACTUALLY HAD ANY THOUGHTS OF KILLING YOURSELF?: NO
1. IN THE PAST MONTH, HAVE YOU WISHED YOU WERE DEAD OR WISHED YOU COULD GO TO SLEEP AND NOT WAKE UP?: NO
6. HAVE YOU EVER DONE ANYTHING, STARTED TO DO ANYTHING, OR PREPARED TO DO ANYTHING TO END YOUR LIFE?: NO

## 2025-02-24 ASSESSMENT — PAIN - FUNCTIONAL ASSESSMENT
PAIN_FUNCTIONAL_ASSESSMENT: 0-10

## 2025-02-24 ASSESSMENT — PAIN SCALES - GENERAL
PAINLEVEL_OUTOF10: 6
PAINLEVEL_OUTOF10: 7
PAINLEVEL_OUTOF10: 7
PAINLEVEL_OUTOF10: 5 - MODERATE PAIN

## 2025-02-24 ASSESSMENT — LIFESTYLE VARIABLES
SUBSTANCE_ABUSE_PAST_12_MONTHS: NO
HOW OFTEN DO YOU HAVE 6 OR MORE DRINKS ON ONE OCCASION: NEVER
HOW MANY STANDARD DRINKS CONTAINING ALCOHOL DO YOU HAVE ON A TYPICAL DAY: PATIENT DOES NOT DRINK
SKIP TO QUESTIONS 9-10: 1
AUDIT-C TOTAL SCORE: 0
HOW OFTEN DO YOU HAVE A DRINK CONTAINING ALCOHOL: NEVER
AUDIT-C TOTAL SCORE: 0
PRESCIPTION_ABUSE_PAST_12_MONTHS: NO

## 2025-02-24 ASSESSMENT — COGNITIVE AND FUNCTIONAL STATUS - GENERAL: PATIENT BASELINE BEDBOUND: YES

## 2025-02-24 ASSESSMENT — PAIN DESCRIPTION - PAIN TYPE: TYPE: ACUTE PAIN

## 2025-02-24 ASSESSMENT — PAIN DESCRIPTION - DESCRIPTORS
DESCRIPTORS: TENDER
DESCRIPTORS: TENDER

## 2025-02-24 ASSESSMENT — PAIN DESCRIPTION - ORIENTATION: ORIENTATION: RIGHT

## 2025-02-24 ASSESSMENT — PATIENT HEALTH QUESTIONNAIRE - PHQ9
SUM OF ALL RESPONSES TO PHQ9 QUESTIONS 1 & 2: 0
1. LITTLE INTEREST OR PLEASURE IN DOING THINGS: NOT AT ALL
2. FEELING DOWN, DEPRESSED OR HOPELESS: NOT AT ALL

## 2025-02-24 ASSESSMENT — PAIN DESCRIPTION - LOCATION: LOCATION: ABDOMEN

## 2025-02-24 NOTE — CONSULTS
NEPHROLOGY CONSULTATION NOTE    DATE OF CONSULTATION:  02/24/25     Referring Physician: Moris Chapman MD    REASON FOR CONSULT: DIMITRI and severe hyperkalemia    HISTORY OF PRESENT ILLNESS:   Esvin Gupta is a 64 y.o. male presenting with wound check at the colostomy site which had been leaking and there was redness and erythema around the surgical site.  He also had been nauseated and feeling very weak since last Friday.  Patient was noted to have low blood pressure creatinine elevation of 5.62 with a BUN of 120 and potassium of 7.3 and thereby nephrology consultation was requested.  Patient underwent right colectomy back in December 2024 postoperatively he developed septic shock rapid response was called and he underwent exploratory laparotomy with ileostomy creation.  He has been in the hospital or at the skilled nursing facility ever since.  He has been having increasing output from the ileostomy.  [facility] to 2 [facility] before being discharged to [address] [place] [address] from where he was sent here to the emergency room as the output from the ileostomy was excessive and the bags that is used to collect it was running short and the nursing facility    In the ED he was noted to have a systolic blood pressure of 70s.  It appears that he has nonischemic cardiomyopathy which she was not aware of.  During his hospital stay at [facility] he developed A-fib and hypotension respiratory failure.  He has history of colorectal cancer stage II for which he underwent the surgery.    While he was at [facility] he did see Dr. Ronnie Madden MD nephrology for the hyperkalemia metabolic acidosis and hyponatremia.  Patient was getting Lokelma 15 g.  Patient states that his potassium was closely monitored.  He was never on dialysis.  He was given sodium bicarbonate and Lokelma but not sure if that was continued when he went to the [facility] nursing facility.  Patient himself is not aware of any  underlying kidney problems    Upon reviewing his medication list it appears that he is on Entresto as well as spironolactone.  His creatinine on 16 February was 1.1 with potassium of 4.6 and bicarb of 24.     PAST MEDICAL HISTORY:    1.  Hyperkalemia related to increased acidosis from the ileostomy output  2.  Hypertension  3.  Nonischemic cardiomyopathy  4.  Colon cancer stage II  5.  Peritonitis following: Cancer surgery  6.  Atrial fibrillation  7.  Blood loss anemia      SURGICAL HISTORY:   1.  Tonsillectomy  2.  Appendectomy  3.  Ileostomy creation       SOCIAL HISTORY:   He reports that he has never smoked. He quit smokeless tobacco use about 43 years ago.  His smokeless tobacco use included chew. He reports that he does not currently use alcohol. He reports that he does not use drugs.  He worked in [employer].  He has 4 brothers and 1 sister.  He was never .  Denies smoking.  Does not drink much alcohol.    FAMILY HISTORY:  Family History   Problem Relation Name Age of Onset    Arthritis Mother      Pancreatic cancer Mother      Other (cardiac pacemaker) Father      Stroke Father      Diabetes Sibling     No kidney disease in the family    ALLERGIES:  Patient has no known allergies.    MEDICATIONS:     No current facility-administered medications on file prior to encounter.     Current Outpatient Medications on File Prior to Encounter   Medication Sig Dispense Refill    acetaminophen (Tylenol) 500 mg tablet Take 2 tablets (1,000 mg) by mouth every 6 hours if needed for mild pain (1 - 3).      cephalexin (Keflex) 500 mg capsule Take 1 capsule (500 mg) by mouth every 6 hours.      Eliquis 5 mg tablet TAKE 1 TABLET (5 MG) BY MOUTH 2 TIMES A DAY. 60 tablet 10    folic acid (Folvite) 1 mg tablet TAKE 1 TABLET BY MOUTH ONCE DAILY 30 tablet 10    glucose (Glutose-15) 40 % gel oral gel Place 15 g into mouth between cheek and gum every 2 hours if needed for low blood sugar - see comments.       glucose 4 gram chewable tablet Chew 1 tablet (4 g) every 2 hours if needed for low blood sugar - see comments.      HumaLOG KwikPen Insulin 100 unit/mL injection Inject 0-6 Units under the skin 2 times a day.      ipratropium-albuteroL (Duo-Neb) 0.5-2.5 mg/3 mL nebulizer solution Take 3 mL by nebulization 4 times a day.      ipratropium-albuteroL (Duo-Neb) 0.5-2.5 mg/3 mL nebulizer solution Take 3 mL by nebulization 4 times a day as needed for wheezing or shortness of breath.      lactose-reduced food/fiber (ISOSOURCE 1.5 ELAINE FTUB) 70 mL/hr by feeding tube route 2 times a day.      metoprolol tartrate (Lopressor) 25 mg tablet Take 1 tablet (25 mg) by mouth 2 times a day.      multivitamin tablet TAKE 1 TABLET BY MOUTH ONCE DAILY. 30 tablet 10    nitroglycerin (Nitrostat) 0.4 mg SL tablet Place 1 tablet (0.4 mg) under the tongue every 5 minutes if needed for chest pain.      pantoprazole (ProtoNix) 40 mg EC tablet Take 1 tablet (40 mg) by mouth once daily in the morning. Take before meals.      sacubitriL-valsartan (Entresto)  mg tablet Take 1 tablet by mouth 2 times a day. 180 tablet 3    sodium bicarbonate 650 mg tablet Take 2 tablets (1,300 mg) by mouth twice a day.      tamsulosin (Flomax) 0.4 mg 24 hr capsule Take 1 capsule (0.4 mg) by mouth once daily.      traMADol (Ultram) 50 mg tablet Take 1 tablet (50 mg) by mouth every 8 hours if needed (pain).      Vitamin B-1, mononitrate, 100 mg tablet TAKE 1 TABLET BY MOUTH DAILY 30 tablet 10    magnesium oxide (Mag-Ox) 400 mg (241.3 mg magnesium) tablet TAKE 1 TABLET BY MOUTH TWICE DAILY 180 tablet 0    metoprolol succinate XL (Toprol-XL) 100 mg 24 hr tablet Take 1 tablet (100 mg) by mouth once daily. (Patient not taking: Reported on 2/24/2025) 90 tablet 3    spironolactone (Aldactone) 25 mg tablet TAKE 1 TABLET BY MOUTH ONCE DAILY (Patient not taking: Reported on 2/24/2025) 30 tablet 10    [DISCONTINUED] magnesium oxide (Mag-Ox) 400 mg (241.3 mg magnesium)  tablet TAKE 1 TABLET (400 MG) BY MOUTH 2 TIMES A DAY. 60 tablet 10        Scheduled medications  [Held by provider] apixaban, 5 mg, oral, BID  cefepime, 1 g, intravenous, q12h  insulin lispro, 0-5 Units, subcutaneous, TID AC  [Held by provider] metoprolol tartrate, 25 mg, oral, BID  pantoprazole, 40 mg, intravenous, Daily  [Held by provider] sacubitriL-valsartan, 1 tablet, oral, BID  sodium polystyrene, 15 g, oral, Once  [Held by provider] tamsulosin, 0.4 mg, oral, Daily      Continuous medications  norepinephrine, 0-0.2 mcg/kg/min, Last Rate: 0.01 mcg/kg/min (02/24/25 1601)  sodium bicarbonate 1 mEq/mL (8.4 %) 150 mEq in dextrose 5% 1,150 mL infusion, 100 mL/hr, Last Rate: 100 mL/hr (02/24/25 1422)  sodium chloride 0.9%, 100 mL/hr      PRN medications  PRN medications: acetaminophen **OR** acetaminophen **OR** acetaminophen, ipratropium-albuteroL, morphine, [Held by provider] nitroglycerin, ondansetron **OR** ondansetron, vancomycin     REVIEW OF SYSTEMS:    No headache, no blurry vision, no fever or chills,  no chest pain, no shortness of breath, no nausea, no vomiting, no diarrhea, no constipation , no focal weakness, no burning urination, no leg swelling. Rest of the 10 point ROS is negative     PHYSICAL EXAM:    Visit Vitals  /58   Pulse 69   Temp 36 °C (96.8 °F)   Resp (!) 31        GENERAL: Awake and Alert, in no distress, Cooperative  EYES: EOMI,  no Pallor noted  HEENT: oral mucosa moist  NECK: supple  CHEST: no tachypnea, no crackles, no wheeze  CVS: S1, S2 heard, Regular Rate and Rhythm, no murmurs, no rubs  ABDOMEN: soft, non tender, bowel sounds present, no distention  MSK: No joint effusion, no tenderness  NEURO: No focal deficit, moving all extremities, no tremor  EXT: no leg edema  PSYCH: normal affect         I&O 24HR  No intake or output data in the 24 hours ending 02/24/25 1614    RESULTS:         Lab Results   Component Value Date    WBC 9.9 02/24/2025    HGB 9.5 (L) 02/24/2025    HCT 27.6  (L) 02/24/2025    MCV 80 02/24/2025     (H) 02/24/2025      Lab Results   Component Value Date    GLUCOSE 112 (H) 02/24/2025    CALCIUM 9.8 02/24/2025     (LL) 02/24/2025    K 7.3 (HH) 02/24/2025    CO2 18 (L) 02/24/2025    CL 84 (L) 02/24/2025     (HH) 02/24/2025    CREATININE 5.62 (H) 02/24/2025         === 08/30/22 ===    XR KNEE COMPLETE 4 OR MORE VIEWS     ASSESSMENT/ PLAN:     This is a 64 y.o. year old male who presents with hypotension and DIMITRI    1.  DIMITRI: Patient's creatinine is at 5.62.  Recent creatinine on 2/16/2025 was 1.1 with potassium of 4.6.  Likely increased ostomy output leading to hypotension and ATN.  He was on Entresto and Aldactone that may have contributed to further worsening of kidney function.  Currently getting bicarbonate infusion.  Holding his offending medications.  I did discuss with him about potential need for dialysis and he is agreeable for it if lab work does not improve with the medications.    2.  Hyperkalemia: Secondary to DIMITRI in addition to being on Aldactone and Entresto.  Has a tendency for hyperkalemia and we will have to be extremely careful and introducing medications like Entresto and Aldactone in the future.  Continue bicarbonate infusion.  He received the cocktail to lower the potassium.  Repeat lab work and potential dialysis if refractory to medical management.    3.  Hyponatremia: Likely due to poor oral intake and increased ostomy output.  He is on isotonic fluids.    4.  Hypotension: Likely due to volume contraction.  Patient does have nonischemic cardiomyopathy and was on Entresto and Aldactone.  Offending medications have been stopped.      Thank you very much for allowing me to participate in the care of this patient.     This note was created using dragon dictation and may contain unintended errors    BISI PIPER MD    02/24/25

## 2025-02-24 NOTE — PROGRESS NOTES
Pharmacy Medication History Review    Esvin Gupta is a 64 y.o. male admitted for No Principal Problem: There is no principal problem currently on the Problem List. Please update the Problem List and refresh.. Pharmacy reviewed the patient's [id number] medications and allergies for accuracy.    The list below reflectives the updated PTA list. Please review each medication in order reconciliation for additional clarification and justification.  Prior to Admission medications    Medication Sig Start Date End Date Taking? Authorizing Provider   acetaminophen (Tylenol) 500 mg tablet Take 2 tablets (1,000 mg) by mouth every 6 hours if needed for mild pain (1 - 3). 2/6/19  Yes Historical Provider, MD   cephalexin (Keflex) 500 mg capsule Take 1 capsule (500 mg) by mouth every 6 hours. 2/23/25 3/2/25 Yes Historical Provider, MD   Eliquis 5 mg tablet TAKE 1 TABLET (5 MG) BY MOUTH 2 TIMES A DAY. 8/8/24  Yes Rosalee Johnson PA-C   folic acid (Folvite) 1 mg tablet TAKE 1 TABLET BY MOUTH ONCE DAILY 5/1/24  Yes Rosalee Johnson PA-C   glucose (Glutose-15) 40 % gel oral gel Place 15 g into mouth between cheek and gum every 2 hours if needed for low blood sugar - see comments. 2/17/25  Yes Historical Provider, MD   glucose 4 gram chewable tablet Chew 1 tablet (4 g) every 2 hours if needed for low blood sugar - see comments. 2/17/25  Yes Historical Provider, MD Alec Fernandez Insulin 100 unit/mL injection Inject 0-6 Units under the skin 2 times a day. 2/17/25  Yes Historical Provider, MD   ipratropium-albuteroL (Duo-Neb) 0.5-2.5 mg/3 mL nebulizer solution Take 3 mL by nebulization 4 times a day. 2/22/25  Yes Historical Provider, MD   ipratropium-albuteroL (Duo-Neb) 0.5-2.5 mg/3 mL nebulizer solution Take 3 mL by nebulization 4 times a day as needed for wheezing or shortness of breath. 2/20/25  Yes Historical Provider, MD   lactose-reduced food/fiber (ISOSOURCE 1.5 ELAINE FTUB) 70 mL/hr by feeding tube route 2 times a  day.   Yes Historical Provider, MD   metoprolol tartrate (Lopressor) 25 mg tablet Take 1 tablet (25 mg) by mouth 2 times a day. 2/17/25  Yes Historical Provider, MD   multivitamin tablet TAKE 1 TABLET BY MOUTH ONCE DAILY. 5/3/24  Yes Rosalee Johnson PA-C   nitroglycerin (Nitrostat) 0.4 mg SL tablet Place 1 tablet (0.4 mg) under the tongue every 5 minutes if needed for chest pain. 2/17/25  Yes Historical Provider, MD   pantoprazole (ProtoNix) 40 mg EC tablet Take 1 tablet (40 mg) by mouth once daily in the morning. Take before meals. 2/17/25  Yes Historical Provider, MD   sacubitriL-valsartan (Entresto)  mg tablet Take 1 tablet by mouth 2 times a day. 5/2/24 5/2/25 Yes CARLEY Mohan-CNP   sodium bicarbonate 650 mg tablet Take 2 tablets (1,300 mg) by mouth twice a day. 2/17/25  Yes Historical Provider, MD   tamsulosin (Flomax) 0.4 mg 24 hr capsule Take 1 capsule (0.4 mg) by mouth once daily. 2/17/25  Yes Historical Provider, MD   traMADol (Ultram) 50 mg tablet Take 1 tablet (50 mg) by mouth every 8 hours if needed (pain). 2/17/25  Yes Historical Provider, MD   Vitamin B-1, mononitrate, 100 mg tablet TAKE 1 TABLET BY MOUTH DAILY 9/30/24  Yes Rosalee Johnson PA-C   magnesium oxide (Mag-Ox) 400 mg (241.3 mg magnesium) tablet TAKE 1 TABLET BY MOUTH TWICE DAILY 2/19/25  no Rosalee Johnson PA-C   metoprolol succinate XL (Toprol-XL) 100 mg 24 hr tablet Take 1 tablet (100 mg) by mouth once daily.  Patient not taking: Reported on 2/24/2025 10/7/24 10/7/25 no Aron Rice DO   spironolactone (Aldactone) 25 mg tablet TAKE 1 TABLET BY MOUTH ONCE DAILY  Patient not taking: Reported on 2/24/2025 5/3/24  no Rosaele Johnson PA-C   magnesium oxide (Mag-Ox) 400 mg (241.3 mg magnesium) tablet TAKE 1 TABLET (400 MG) BY MOUTH 2 TIMES A DAY. 4/2/24 2/19/25 hazel Johnson PA-C        The list below reflectives the updated allergy list. Please review each documented allergy for additional clarification and  justification.  Allergies  Reviewed by Eryn Abdullahi on 2/24/2025   No Known Allergies         Below are additional concerns with the patient's PTA list.    Medication list from [facility], printed 02/24/2025.    Eryn Abdullahi

## 2025-02-24 NOTE — CONSULTS
Vancomycin Dosing by Pharmacy- INITIAL    Esvin Gupta is a 64 y.o. year old male who Pharmacy has been consulted for vancomycin dosing for Abdominal Infection.  Based on the patient's indication and renal status this patient will be dosed based on a goal trough/random level of 15-20.     Renal function is currently declining.    Visit Vitals  BP (!) 81/44   Pulse 70   Temp 36 °C (96.8 °F)   Resp 20        Lab Results   Component Value Date    CREATININE 5.62 (H) 2025    CREATININE 0.88 10/16/2024    CREATININE 0.86 2024    CREATININE 0.70 06/15/2022    CREATININE 0.52 2022    CREATININE 0.55 2022    CREATININE 0.57 06/10/2022        Patient weight is as follows:   Vitals:    25 1118   Weight: 72.6 kg (160 lb)       Cultures:  No results found for the encounter in last 14 days.        No intake/output data recorded.  I/O during current shift:  No intake/output data recorded.    Temp (24hrs), Av °C (96.8 °F), Min:36 °C (96.8 °F), Max:36 °C (96.8 °F)         Assessment/Plan     Patient has already been given a loading dose of 1500 mg.  Will initiate vancomycin maintenance, after Random level received and updated creatinine    Follow-up level will be ordered on  at 1100 unless clinically indicated sooner.  Will continue to monitor renal function daily while on vancomycin and order serum creatinine at least every 48 hours if not already ordered.  Follow for continued vancomycin needs, clinical response, and signs/symptoms of toxicity.       Angie Hernandez RPh

## 2025-02-24 NOTE — NURSING NOTE
"Admission complete. Right side of abdomen is red and excoriated with a large amount of soft stool present on skin. Patient very anxious when staff attempts to look at it. Patient states the facility ran out of the proper sized bags \"for a couple of weeks now,\" and have just been placing towels on his abdomen to collect the stool.   "

## 2025-02-24 NOTE — ED TRIAGE NOTES
Patient BIB EMS from [facility] for c/o wound check at gastrostomy site. Patient states he has had it for 3 months now, the last week his SNF has not had the correct size bag for him, has been leaking. Patient states he has had it uncovered since last night. Patients right side of abdomen is very red, painful to the touch. Patient endorses dizziness and nausea since Friday, has also felt significantly weaker than normal.

## 2025-02-24 NOTE — CONSULTS
General Surgery Attending Note    My Acute Care Surgery SAMRA (Advanced Practice Provider) evaluated this patient today.  Please see that documentation for details.    I saw the patient with the SMARA today, and personally evaluated and examined the patient.  My findings are consistent with those of the SAMRA.        Impression:      This debilitated 64-year-old male with a complex abdominal surgical history was admitted to [facility] today to the medical service with acute renal failure and dehydration with multiple electrolyte abnormalities.  The general surgery service was asked to evaluate the abdomen.    The patient's previous surgical intervention was done at [facility] in [facility].  This is well-documented on my SAMRA's consultation.  The patient underwent laparoscopic ileocolectomy in early December 2024 complicated by a perforated proximal jejunal diverticulum on postoperative day #7 resulting in fecal peritonitis and multiple additional operations including an exploratory laparotomy, staged closure of the abdominal wall, diverting loop ileostomy, tracheostomy, and gastrostomy.  The patient was ultimately discharged to an LTAC for several months, and then to a local SNF in [place].    Apparently the patient was sans ostomy appliance for several days, resulting in feculent leakage along the abdominal wall.    On examination, the patient looks chronically ill and quite thin and cachectic.  His abdomen is soft and scaphoid.  There is a well-healed midline scar.   There is a gastrostomy tube intact in the left upper quadrant.  There is a loop ileostomy in the right mid abdomen.  There is fire engine red erythema of the right abdominal wall extending from the midline scar to the posterior axillary line, with areas of excoriated skin.  This is most consistent with a contact dermatitis.  This is photo-documented in my SAMRA's consultation.    Patient has no leukocytosis.  Every basic electrolyte is abnormal with  significant elevation of BUN and creatinine consistent with acute renal failure.    Impression is 64-year-old male with complicated abdominal surgical history with evidence of severe fecal contact dermatitis of the right abdominal wall.  Abdominal exam is otherwise benign.      Plan:      Agree with my SAMRA that this is a complex problem with no easy or quick solution.    The first objective is to control the fecal stream with a properly fitting ostomy appliance, to prevent further leakage and ongoing dermatitis.      Will also check with pharmacy regarding a medicated barrier-type cream or ointment to place on the erythematous skin after a properly fitting ostomy appliance is accomplished.    Our options are limited as we have no enterostomal therapist (WOC Nurse) on staff at [facility].    Will follow.

## 2025-02-24 NOTE — H&P
SUBJECTIVE     HPI:  Esvin Gupta is a 64 y.o. year old male with a history of hypertension, pulmonary hypertension hyperlipidemia, paroxysmal A-fib on Eliquis, nonischemic cardiomyopathy, CHF, recent colectomy/ileostomy creation secondary to stage II colon cancer, RA, history of alcohol/tobacco use.  Patient presented to the ED due to a rash and pain throughout colostomy site    Recent hospitalizations:  Patient taken to the OR on 12/11/2024 for right colectomy, required transfusions and entered A-fib with RVR prompting cardiology consult, rapid response was called for hypotension patient was taken back to the OR on 12/16/2024 for exploratory laparotomy and subsequently brought to ICU requiring intubation.,  Patient was taken to the OR again on 12/18/2024 for ileostomy creation and abdominal washout.  Over the next few days patient developed concern for heparin-induced thrombocytopenia which eventually resolved.  Patient also required Lasix drip during this stay and aggressive diuresis.  Patient was accepted to LTAC at 1/7/25 and was there for roughly 1-2 months    In the ED: In the ED patient's vitals were notable for worsening hypotension as low as 70/40.  BMP notable for profound hyponatremia 118, severe hyperkalemia 7.3, uremia 120, DIMITRI with CR 5.6.  CBC largely unremarkable other than platelets 457.  Blood gas notable for pH 7.33 no lactic acidosis. In the ED patient was given hyperkalemia cocktail, 1 L LR, Zosyn/vancomycin, and started on Levophed/sodium bicarb.  Patient staffed with ICU team and admitted for septic shock requiring pressors and kidney failure.    PMH: as above  PSH: Colectomy, ileostomy, appendectomy, tonsillectomy  SH: Prior smoker, alcohol user      OBJECTIVE     Physical Exam:  General:  Pleasant and cooperative.  HEENT:  Normocephalic, atraumatic  Chest:  Clear to auscultation bilaterally. No wheezes, rales, or rhonchi.  CV:  Regular rate and rhythm. No murmurs    Abdomen: Abdomen  is soft, non-tender, non-distended. BS +   Extremities:  No lower extremity edema or cyanosis.   Neurological:  AAOx3. No focal deficits.  Skin:  Warm and dry.    ASSESSMENT & PLAN     Esvin Gupta is a 64 y.o. year old male with a history of hypertension, pulmonary hypertension hyperlipidemia, paroxysmal A-fib on Eliquis, nonischemic cardiomyopathy, CHF, recent colectomy/ileostomy creation secondary to stage II colon cancer, RA, history of alcohol/tobacco use.  Patient presented to the ED due to a rash and pain throughout colostomy site.  Patient admitted to ICU for septic shock secondary to abdominal infection, DIMITRI, and several electrolyte normalities.    Neuro/Constitutional  #Severe protein calorie malnutrition  #Pain over ostomy site  -Patient orientated to person place and time  PLAN:  -Analgesia: Tylenol mild, morphine severe    Cardiovascular  #Septic shock secondary to abdominal infection  #Paroxysmal A-fib  #HFrEF  #Nonischemic cardiomyopathy  #History of pulmonary hypertension, hyperlipidemia, hypertension  -Last echo 1/1/2025: EF 30-35%  PLAN:  -Continue with Levophed, maintain MAP>65  -Continue maintenance fluids  -Holding home blood pressure meds  -Holding Eliquis pending surgery recs     Pulmonary  #No acute issues  PLAN:  -Not currently requiring O2     GI  #Colostomy/ileostomy creation secondary to stage II colon cancer  #Concern for abdominal infection  PLAN:  -Consulted surgery  -Ordered CT abdomen pelvis without contrast  -Consider adding GI  -Continue Protonix IV    Renal  #Likely prerenal DIMITRI  #Electrolyte disturbances:       #Hyperkalemia       #Severe nonsymptomatic hyponatremia  -Baseline CR 0.8  PLAN:  -Nephrology consulted  -Continue with NS maintenance fluids, bicarb  -Given hyperkalemia cocktail in ED, ordered Kayexalate    Endocrine  #No acute issues    Heme/Onc  #Thrombocytosis  PLAN:  -Continue to trend CBC, likely sign of inflammation    ID  #Concern for abdominal  infection  -Culture Data: Follow-up blood cultures, wound cultures  PLAN:  -Continue cefepime  -Follow-up abdominal imaging  -Continue cefepime, vancomycin    Skin/MSK  #Ostomy irritation  PLAN:  -Continue wound care    ICU CHECK LIST  Antimicrobials: Cefepime/vancomycin  Oxygen: Oxygen as needed  Feeding: N.p.o.  Drips: Levophed  Fluids: NS/bicarb  Analgesia: -   Sedation: -  VTE ppx: Holding  GI ppx: PPx  Glycemic control: SSI  Bowel care: -  Indwelling catheters: -  Lines: Ostomy  Code Status: Full    Gulshan Dixon   PGY-II, Internal Medicine

## 2025-02-24 NOTE — CONSULTS
Reason For Consult  Ostomy concerning for infection, reassess     History Of Present Illness  Esvin Gupta is a 64 y.o. male who presented to [facility] ED on 2/24 via EMS from [facility] for evaluation of a wound around his ileostomy.    Patient has a history of stage II Colon cancer s/p planned laparoscopic right colectomy on 12/11/2024 with Dr. Montalvo at [facility]. Postoperative course complicated by perforated jejunal diverticulum with feculent peritonitis requiring take-back to OR, exploratory laparotomy, and ultimately diverting ileostomy with staged midline wound closure. His hospital course was complicated by acute respiratory failure with vent dependence s/p Trach/PEG. He required LTAC for rehabilitation for several months. He was ultimately decannulated from trach. Discharged to SNF on 2/17/25. Has been eating regular diet with supplemental nocturnal TF.    Patient states that for the last 3 days the nursing home has not had the proper size ostomy bag. As a result, his ileostomy has been leaking all over his skin. Patient has extensive contact dermatitis from stool contamination. There is raw/bleeding skin in dependent portions of his RLQ abdomen.  Other than the burning pain of his skin wound, patient denies any other abdominal pain, nausea, or vomiting.    Patient severely dehydrated with multiple electrolyte abnormalities (Na+ 118, K 7.3, Cl 84) and severe DIMITRI (Creatinine 0.93 on 2/13 at LTAC); Creatinine on admission to ED: 5.62.    Patient without leukocytosis. ED sent Blood cultures and UA (negative) and urine culture. CXR without acute cardiopulmonary findings. Patient started on IV norepinephrine and IV Sodium Bicarb gtt. Admitted to ICU with shock/sepsis. CT A/P pending.     Past Medical History  He has a past medical history of Other forms of angina pectoris, Personal history of other diseases of the circulatory system, Personal history of other diseases of the musculoskeletal  system and connective tissue, and Underweight (09/20/2022).    Surgical History  He has a past surgical history that includes Other surgical history (08/02/2022) and Other surgical history (08/30/2022).     Social History  He reports that he has never smoked. He quit smokeless tobacco use about 43 years ago.  His smokeless tobacco use included chew. He reports that he does not currently use alcohol. He reports that he does not use drugs.    Family History  Family History   Problem Relation Name Age of Onset    Arthritis Mother      Pancreatic cancer Mother      Other (cardiac pacemaker) Father      Stroke Father      Diabetes Sibling          Allergies  Patient has no known allergies.    Review of Systems  Reviewed from H&P and noted above     Physical Exam:  Constitutional:         Thin, chronically-ill appearing male patient resting on cart in ED, in acute distress related to pain from RLQ wound  HENT:     dry mucous membranes, temporal muscle wasting   Eyes:      sclera white  Cardiovascular:      regular heart rate, hypotension   Pulmonary:      3L NC, labored breathing related to pain only  Abdominal:      RLQ ileostomy- pink and moist- RLQ initially with caked on and fresh mushy brown-green stool all over skin - cleaned skin free from stool. Midline scar present- no skin opening. Skin once more requiring cleansing of stool. Left upper quadrant PEG- site c,d,I without drainage. Secured with lange  :     Clear yellow urine in urinal   Skin:     No pallor. Extensive erythematous changes -not blanching- contact dermatitis RLQ with areas of sloughed epidermis and active bleeding  Extremities:     Extremely thin, foot drop  Neurological:      A&O x3  Psychiatric:        Anxious          Last Recorded Vitals  Blood pressure 90/55, pulse 63, temperature 36 °C (96.8 °F), resp. rate 15, height 1.829 m (6'), weight 72.6 kg (160 lb), SpO2 94%.    Relevant Results  Results for orders placed or performed during the  hospital encounter of 02/24/25 (from the past 24 hours)   CBC and Auto Differential   Result Value Ref Range    WBC 9.9 4.4 - 11.3 x10*3/uL    nRBC 0.0 0.0 - 0.0 /100 WBCs    RBC 3.47 (L) 4.50 - 5.90 x10*6/uL    Hemoglobin 9.5 (L) 13.5 - 17.5 g/dL    Hematocrit 27.6 (L) 41.0 - 52.0 %    MCV 80 80 - 100 fL    MCH 27.4 26.0 - 34.0 pg    MCHC 34.4 32.0 - 36.0 g/dL    RDW 16.3 (H) 11.5 - 14.5 %    Platelets 457 (H) 150 - 450 x10*3/uL    Neutrophils % 57.2 40.0 - 80.0 %    Immature Granulocytes %, Automated 0.8 0.0 - 0.9 %    Lymphocytes % 29.4 13.0 - 44.0 %    Monocytes % 11.6 2.0 - 10.0 %    Eosinophils % 0.8 0.0 - 6.0 %    Basophils % 0.2 0.0 - 2.0 %    Neutrophils Absolute 5.65 1.20 - 7.70 x10*3/uL    Immature Granulocytes Absolute, Automated 0.08 0.00 - 0.70 x10*3/uL    Lymphocytes Absolute 2.90 1.20 - 4.80 x10*3/uL    Monocytes Absolute 1.15 (H) 0.10 - 1.00 x10*3/uL    Eosinophils Absolute 0.08 0.00 - 0.70 x10*3/uL    Basophils Absolute 0.02 0.00 - 0.10 x10*3/uL   Comprehensive Metabolic Panel   Result Value Ref Range    Glucose 112 (H) 74 - 99 mg/dL    Sodium 118 (LL) 136 - 145 mmol/L    Potassium 7.3 (HH) 3.5 - 5.3 mmol/L    Chloride 84 (L) 98 - 107 mmol/L    Bicarbonate 18 (L) 21 - 32 mmol/L    Anion Gap 23 (H) 10 - 20 mmol/L    Urea Nitrogen 120 (HH) 6 - 23 mg/dL    Creatinine 5.62 (H) 0.50 - 1.30 mg/dL    eGFR 11 (L) >60 mL/min/1.73m*2    Calcium 9.8 8.6 - 10.3 mg/dL    Albumin 3.9 3.4 - 5.0 g/dL    Alkaline Phosphatase 188 (H) 33 - 136 U/L    Total Protein 7.8 6.4 - 8.2 g/dL    AST 11 9 - 39 U/L    Bilirubin, Total 0.4 0.0 - 1.2 mg/dL    ALT 18 10 - 52 U/L   Lactate   Result Value Ref Range    Lactate 0.7 0.4 - 2.0 mmol/L   BLOOD GAS VENOUS FULL PANEL   Result Value Ref Range    POCT pH, Venous 7.33 7.33 - 7.43 pH    POCT pCO2, Venous 36 (L) 41 - 51 mm Hg    POCT pO2, Venous 29 (L) 35 - 45 mm Hg    POCT SO2, Venous 32 (L) 45 - 75 %    POCT Oxy Hemoglobin, Venous 30.9 (L) 45.0 - 75.0 %    POCT Hematocrit  Calculated, Venous 24.0 (L) 41.0 - 52.0 %    POCT Sodium, Venous 121 (L) 136 - 145 mmol/L    POCT Potassium, Venous 6.8 (HH) 3.5 - 5.3 mmol/L    POCT Chloride, Venous 91 (L) 98 - 107 mmol/L    POCT Ionized Calicum, Venous 1.21 1.10 - 1.33 mmol/L    POCT Glucose, Venous 98 74 - 99 mg/dL    POCT Lactate, Venous 2.0 0.4 - 2.0 mmol/L    POCT Base Excess, Venous -6.3 (L) -2.0 - 3.0 mmol/L    POCT HCO3 Calculated, Venous 19.0 (L) 22.0 - 26.0 mmol/L    POCT Hemoglobin, Venous 7.9 (L) 13.5 - 17.5 g/dL    POCT Anion Gap, Venous 18.0 10.0 - 25.0 mmol/L    Patient Temperature 37.0 degrees Celsius    FiO2 21 %    Critical Called By SUZANNE TORRES RRT     Critical Called To DR FLOWER     Critical Call Time 1342     Critical Read Back Y    Urinalysis with Reflex Culture and Microscopic   Result Value Ref Range    Color, Urine Light-Yellow Light-Yellow, Yellow, Dark-Yellow    Appearance, Urine Clear Clear    Specific Gravity, Urine 1.009 1.005 - 1.035    pH, Urine 7.0 5.0, 5.5, 6.0, 6.5, 7.0, 7.5, 8.0    Protein, Urine 20 (TRACE) NEGATIVE, 10 (TRACE), 20 (TRACE) mg/dL    Glucose, Urine 70 (1+) (A) Normal mg/dL    Blood, Urine NEGATIVE NEGATIVE mg/dL    Ketones, Urine NEGATIVE NEGATIVE mg/dL    Bilirubin, Urine NEGATIVE NEGATIVE mg/dL    Urobilinogen, Urine Normal Normal mg/dL    Nitrite, Urine NEGATIVE NEGATIVE    Leukocyte Esterase, Urine 25 Patty/uL (A) NEGATIVE   Microscopic Only, Urine   Result Value Ref Range    WBC, Urine 11-20 (A) 1-5, NONE /HPF    RBC, Urine NONE NONE, 1-2, 3-5 /HPF          Assessment/Plan     64 year old male presented to [facility] ED on 2/24 from SNF with concern for wound around ileostomy. Patient in hypovolemic shock with multiple electrolyte derangements requiring IVF resuscitation and vasopressor support.   Patient with a history of NICM, HTN, HLD, pulmonary HTN, HFrecEF, colon cancer s/p Right colectomy (12/11/2024) c/b small bowel perforation with feculent peritonitis s/p diverting loop ileostomy  (12/16/2024) at [facility].      Impression:  Contact dermatitis RLQ abdomen from improper ostomy care    > this is an extensive wound and will take some time to heal. Skin needs to remain clean, dry, and completely free from stool. Ensuring proper ostomy pouch fit is essential; however, is now quite complex and his skin is so excoriated that the adhesive may not remain secure.     Recommendations:  - will trial Convex wafer as the initial trial of flat wafer yielded leaking of stool from lateral portion of ostomy pouch  - will request Duoderm from supply room to protect surrounding excoriated skin and facilitate seal  - avoid Stoma stewart as this may burn/exacerbate patient's pain  - always use No sting barrier wipes around ostomy prior to bag application   - if possible, have patient lay on left side to minimize and lateral leaking of stool  - defer antibiotic management and remainder of care to medical team (no abx needed from surgery standpoint)   > will review CT A/P      The patient was seen and discussed with the attending surgeon Dr. Lopez    I spent 30 minutes in the professional and overall care of this patient.  Greater than 50% of this time was spent counseling patient, reviewing plan of care, and in coordination of care.        Lea Humphrey, APRN-CNP

## 2025-02-24 NOTE — ED PROVIDER NOTES
History of Present Illness     HPI:  Esvin Gupta is a 64 y.o. male with a past medical history of stage II colon cancer s/p diverting colostomy, A-fib on Eliquis, HTN, CHF, and pulmonary hypertension who presents with a rash.  Patient notes having a rash throughout his colostomy site over the past few days it is painful.  Notes having some weakness.  Has had some nausea as well.  No chest pain.  No bruit difficulty breathing.  No vomiting.    Physical Exam   Triage vitals:  T 36 °C (96.8 °F)  HR 81  BP 91/56  RR 20  O2 96 % None (Room air)    General: Well appearing and in no acute distress.  HEENT: NCAT. PERRL. Oropharynx pink and moist.  CV: Regular rate, regular rhythm. No murmurs, rubs, or gallops.  Resp: Normal effort. CTAB.  GI: There is warmth and erythema surrounding the colostomy site. No crepitus. No palpable fluctuance. No tenderness outside of the erythematous surface. No rebound or guarding.  Extremities: No lower extremity edema. 2+ radial pulses bilaterally.  Neuro: Moving all extremities bilaterally.  Psych: Appropriate mood and affect    Medical Decision Making & ED Course   Medical Decision Making:  This is a 64 y.o. male with a past medical history of stage II colon cancer s/p diverting colostomy, A-fib on Eliquis, HTN, CHF, and pulmonary hypertension who presents with a rash.  Patient is having worsening rash that is painful to his colostomy site.  On exam is consistent with cellulitis.  No crepitus concerning for neck fasc or fluctuance for abscess.  Patient was hypotensive.  With concern for sepsis he was started on broad-spectrum antibiotics and fluids.  Labs resulted here showing signs concerning for acute renal failure with a BUN of 120.  Calcium 7.3.  EKG obtained here showing peaked T waves.  Patient was given medications to treat the hyperkalemia.    Patient reassessed and was still persistently hypotensive.  He was started on  an additional liter of IV fluids.  In discussion  with ICU will start the patient on bicarb drip as well.  Case discussed with ICU and patient is admitted for further management.  Patient was given a second liter of fluids.  During this, continue to became more hypotensive.  Patient was started on Levophed.  Nephrology was also consulted for emergent hemodialysis.  ----    EKG Independent Interpretation:  EKG interpreted by me shows normal sinus rhythm with a normal axis, QTc 403, peaked T waves in the lateral leads, and no acute ischemic changes.  EKG.    ED Course:  Diagnoses as of 02/24/25 1356   Acute renal failure, unspecified acute renal failure type (CMS-HCC)   Cellulitis of abdominal wall   Hyperkalemia   Hyponatremia     Disposition   As a result of their workup, the patient will require admission to the hospital.  The patient was informed of his diagnosis.  The patient was given the opportunity to ask questions and I answered them. The patient agreed to be admitted to the hospital.    Procedures   Critical Care    Performed by: Toby Morocho MD  Authorized by: Toby Morocho MD    Critical care provider statement:     Critical care time (minutes):  45    Critical care time was exclusive of:  Separately billable procedures and treating other patients    Critical care was necessary to treat or prevent imminent or life-threatening deterioration of the following conditions:  Sepsis, circulatory failure, shock, endocrine crisis and metabolic crisis    Critical care was time spent personally by me on the following activities:  Blood draw for specimens, development of treatment plan with patient or surrogate, discussions with consultants, evaluation of patient's response to treatment, examination of patient, obtaining history from patient or surrogate, ordering and performing treatments and interventions, ordering and review of laboratory studies, ordering and review of radiographic studies, pulse oximetry, re-evaluation of patient's condition and review of old  charts    Care discussed with: admitting provider        Toby Morocho MD  Emergency Medicine     Toby Morocho MD  02/24/25 1409       Toby Morocho MD  02/24/25 1438

## 2025-02-25 ENCOUNTER — APPOINTMENT (OUTPATIENT)
Dept: CARDIOLOGY | Facility: HOSPITAL | Age: 65
End: 2025-02-25
Payer: MEDICAID

## 2025-02-25 LAB
ALBUMIN SERPL BCP-MCNC: 3.1 G/DL (ref 3.4–5)
ALP SERPL-CCNC: 138 U/L (ref 33–136)
ALT SERPL W P-5'-P-CCNC: 16 U/L (ref 10–52)
ANION GAP SERPL CALC-SCNC: 13 MMOL/L (ref 10–20)
ANION GAP SERPL CALC-SCNC: 13 MMOL/L (ref 10–20)
ANION GAP SERPL CALC-SCNC: 16 MMOL/L (ref 10–20)
AST SERPL W P-5'-P-CCNC: 10 U/L (ref 9–39)
BACTERIA UR CULT: NO GROWTH
BILIRUB SERPL-MCNC: 0.7 MG/DL (ref 0–1.2)
BUN SERPL-MCNC: 46 MG/DL (ref 6–23)
BUN SERPL-MCNC: 74 MG/DL (ref 6–23)
BUN SERPL-MCNC: 82 MG/DL (ref 6–23)
CALCIUM SERPL-MCNC: 8.3 MG/DL (ref 8.6–10.3)
CALCIUM SERPL-MCNC: 9.2 MG/DL (ref 8.6–10.3)
CALCIUM SERPL-MCNC: 9.3 MG/DL (ref 8.6–10.3)
CHLORIDE SERPL-SCNC: 93 MMOL/L (ref 98–107)
CHLORIDE SERPL-SCNC: 93 MMOL/L (ref 98–107)
CHLORIDE SERPL-SCNC: 94 MMOL/L (ref 98–107)
CO2 SERPL-SCNC: 23 MMOL/L (ref 21–32)
CO2 SERPL-SCNC: 24 MMOL/L (ref 21–32)
CO2 SERPL-SCNC: 24 MMOL/L (ref 21–32)
CORTIS SERPL-MCNC: 21.5 UG/DL (ref 2.5–20)
CREAT SERPL-MCNC: 1.57 MG/DL (ref 0.5–1.3)
CREAT SERPL-MCNC: 2.94 MG/DL (ref 0.5–1.3)
CREAT SERPL-MCNC: 3.35 MG/DL (ref 0.5–1.3)
EGFRCR SERPLBLD CKD-EPI 2021: 20 ML/MIN/1.73M*2
EGFRCR SERPLBLD CKD-EPI 2021: 23 ML/MIN/1.73M*2
EGFRCR SERPLBLD CKD-EPI 2021: 49 ML/MIN/1.73M*2
ERYTHROCYTE [DISTWIDTH] IN BLOOD BY AUTOMATED COUNT: 15.9 % (ref 11.5–14.5)
GLUCOSE BLD MANUAL STRIP-MCNC: 136 MG/DL (ref 74–99)
GLUCOSE BLD MANUAL STRIP-MCNC: 174 MG/DL (ref 74–99)
GLUCOSE BLD MANUAL STRIP-MCNC: 181 MG/DL (ref 74–99)
GLUCOSE SERPL-MCNC: 116 MG/DL (ref 74–99)
GLUCOSE SERPL-MCNC: 152 MG/DL (ref 74–99)
GLUCOSE SERPL-MCNC: 155 MG/DL (ref 74–99)
HCT VFR BLD AUTO: 22.9 % (ref 41–52)
HGB BLD-MCNC: 7.8 G/DL (ref 13.5–17.5)
HOLD SPECIMEN: NORMAL
MAGNESIUM SERPL-MCNC: 1.79 MG/DL (ref 1.6–2.4)
MCH RBC QN AUTO: 27.4 PG (ref 26–34)
MCHC RBC AUTO-ENTMCNC: 34.1 G/DL (ref 32–36)
MCV RBC AUTO: 80 FL (ref 80–100)
NRBC BLD-RTO: 0 /100 WBCS (ref 0–0)
PLATELET # BLD AUTO: 387 X10*3/UL (ref 150–450)
POTASSIUM SERPL-SCNC: 3.8 MMOL/L (ref 3.5–5.3)
POTASSIUM SERPL-SCNC: 4.4 MMOL/L (ref 3.5–5.3)
POTASSIUM SERPL-SCNC: 4.4 MMOL/L (ref 3.5–5.3)
PROT SERPL-MCNC: 6.1 G/DL (ref 6.4–8.2)
RBC # BLD AUTO: 2.85 X10*6/UL (ref 4.5–5.9)
SODIUM SERPL-SCNC: 126 MMOL/L (ref 136–145)
SODIUM SERPL-SCNC: 127 MMOL/L (ref 136–145)
SODIUM SERPL-SCNC: 128 MMOL/L (ref 136–145)
UFH PPP CHRO-ACNC: 0.6 IU/ML
UFH PPP CHRO-ACNC: 0.6 IU/ML
UFH PPP CHRO-ACNC: 0.8 IU/ML
UFH PPP CHRO-ACNC: 1.4 IU/ML
UFH PPP CHRO-ACNC: 1.9 IU/ML
VANCOMYCIN SERPL-MCNC: 11.9 UG/ML (ref 5–20)
WBC # BLD AUTO: 8.7 X10*3/UL (ref 4.4–11.3)

## 2025-02-25 PROCEDURE — 82947 ASSAY GLUCOSE BLOOD QUANT: CPT

## 2025-02-25 PROCEDURE — 2500000005 HC RX 250 GENERAL PHARMACY W/O HCPCS: Performed by: STUDENT IN AN ORGANIZED HEALTH CARE EDUCATION/TRAINING PROGRAM

## 2025-02-25 PROCEDURE — 2500000002 HC RX 250 W HCPCS SELF ADMINISTERED DRUGS (ALT 637 FOR MEDICARE OP, ALT 636 FOR OP/ED)

## 2025-02-25 PROCEDURE — 99291 CRITICAL CARE FIRST HOUR: CPT

## 2025-02-25 PROCEDURE — 2500000004 HC RX 250 GENERAL PHARMACY W/ HCPCS (ALT 636 FOR OP/ED)

## 2025-02-25 PROCEDURE — 36556 INSERT NON-TUNNEL CV CATH: CPT | Performed by: STUDENT IN AN ORGANIZED HEALTH CARE EDUCATION/TRAINING PROGRAM

## 2025-02-25 PROCEDURE — 80202 ASSAY OF VANCOMYCIN: CPT

## 2025-02-25 PROCEDURE — 99232 SBSQ HOSP IP/OBS MODERATE 35: CPT | Performed by: NURSE PRACTITIONER

## 2025-02-25 PROCEDURE — 80048 BASIC METABOLIC PNL TOTAL CA: CPT | Mod: CCI

## 2025-02-25 PROCEDURE — 85027 COMPLETE CBC AUTOMATED: CPT

## 2025-02-25 PROCEDURE — C8924 2D TTE W OR W/O FOL W/CON,FU: HCPCS

## 2025-02-25 PROCEDURE — 93308 TTE F-UP OR LMTD: CPT | Performed by: STUDENT IN AN ORGANIZED HEALTH CARE EDUCATION/TRAINING PROGRAM

## 2025-02-25 PROCEDURE — 80053 COMPREHEN METABOLIC PANEL: CPT

## 2025-02-25 PROCEDURE — 83735 ASSAY OF MAGNESIUM: CPT

## 2025-02-25 PROCEDURE — 99232 SBSQ HOSP IP/OBS MODERATE 35: CPT | Performed by: SURGERY

## 2025-02-25 PROCEDURE — 37799 UNLISTED PX VASCULAR SURGERY: CPT

## 2025-02-25 PROCEDURE — 2020000001 HC ICU ROOM DAILY

## 2025-02-25 PROCEDURE — 85520 HEPARIN ASSAY: CPT | Performed by: STUDENT IN AN ORGANIZED HEALTH CARE EDUCATION/TRAINING PROGRAM

## 2025-02-25 PROCEDURE — 2500000001 HC RX 250 WO HCPCS SELF ADMINISTERED DRUGS (ALT 637 FOR MEDICARE OP)

## 2025-02-25 PROCEDURE — 82746 ASSAY OF FOLIC ACID SERUM: CPT | Mod: PARLAB

## 2025-02-25 PROCEDURE — 2500000004 HC RX 250 GENERAL PHARMACY W/ HCPCS (ALT 636 FOR OP/ED): Performed by: STUDENT IN AN ORGANIZED HEALTH CARE EDUCATION/TRAINING PROGRAM

## 2025-02-25 PROCEDURE — 02HV33Z INSERTION OF INFUSION DEVICE INTO SUPERIOR VENA CAVA, PERCUTANEOUS APPROACH: ICD-10-PCS | Performed by: STUDENT IN AN ORGANIZED HEALTH CARE EDUCATION/TRAINING PROGRAM

## 2025-02-25 PROCEDURE — 82607 VITAMIN B-12: CPT | Mod: PARLAB

## 2025-02-25 PROCEDURE — 3E0G76Z INTRODUCTION OF NUTRITIONAL SUBSTANCE INTO UPPER GI, VIA NATURAL OR ARTIFICIAL OPENING: ICD-10-PCS | Performed by: STUDENT IN AN ORGANIZED HEALTH CARE EDUCATION/TRAINING PROGRAM

## 2025-02-25 PROCEDURE — 85520 HEPARIN ASSAY: CPT

## 2025-02-25 RX ORDER — DEXTROSE MONOHYDRATE 50 MG/ML
100 INJECTION, SOLUTION INTRAVENOUS CONTINUOUS
Status: DISCONTINUED | OUTPATIENT
Start: 2025-02-25 | End: 2025-02-25

## 2025-02-25 RX ORDER — VASOPRESSIN 20 UNIT/100 ML (0.2 UNIT/ML) IN 0.9 % SODIUM CHLORIDE IV
0.03 SOLUTION CONTINUOUS
Status: DISCONTINUED | OUTPATIENT
Start: 2025-02-25 | End: 2025-02-25

## 2025-02-25 RX ORDER — MULTIVIT-MIN/IRON FUM/FOLIC AC 7.5 MG-4
1 TABLET ORAL DAILY
Status: DISCONTINUED | OUTPATIENT
Start: 2025-02-25 | End: 2025-03-06 | Stop reason: HOSPADM

## 2025-02-25 RX ORDER — DEXTROSE MONOHYDRATE AND SODIUM CHLORIDE 5; .45 G/100ML; G/100ML
100 INJECTION, SOLUTION INTRAVENOUS CONTINUOUS
Status: DISCONTINUED | OUTPATIENT
Start: 2025-02-25 | End: 2025-02-26

## 2025-02-25 RX ORDER — VANCOMYCIN HYDROCHLORIDE 1.25 G/250ML
1250 INJECTION, SOLUTION INTRAVITREAL ONCE
Status: COMPLETED | OUTPATIENT
Start: 2025-02-25 | End: 2025-02-25

## 2025-02-25 RX ORDER — HEPARIN SODIUM 10000 [USP'U]/100ML
0-4000 INJECTION, SOLUTION INTRAVENOUS CONTINUOUS
Status: DISCONTINUED | OUTPATIENT
Start: 2025-02-25 | End: 2025-02-27

## 2025-02-25 RX ORDER — THIAMINE HYDROCHLORIDE 100 MG/ML
500 INJECTION, SOLUTION INTRAMUSCULAR; INTRAVENOUS ONCE
Status: COMPLETED | OUTPATIENT
Start: 2025-02-25 | End: 2025-02-25

## 2025-02-25 RX ORDER — BISMUTH SUBSALICYLATE 262 MG
1 TABLET,CHEWABLE ORAL DAILY
Status: DISCONTINUED | OUTPATIENT
Start: 2025-02-25 | End: 2025-02-25

## 2025-02-25 RX ADMIN — DEXTROSE AND SODIUM CHLORIDE 100 ML/HR: 5; 450 INJECTION, SOLUTION INTRAVENOUS at 10:57

## 2025-02-25 RX ADMIN — AMIODARONE HYDROCHLORIDE 1 MG/MIN: 1.8 INJECTION, SOLUTION INTRAVENOUS at 03:23

## 2025-02-25 RX ADMIN — INSULIN LISPRO 1 UNITS: 100 INJECTION, SOLUTION INTRAVENOUS; SUBCUTANEOUS at 11:39

## 2025-02-25 RX ADMIN — VANCOMYCIN HYDROCHLORIDE 1250 MG: 1.25 INJECTION, SOLUTION INTRAVITREAL at 13:04

## 2025-02-25 RX ADMIN — DEXTROSE MONOHYDRATE 100 ML/HR: 50 INJECTION, SOLUTION INTRAVENOUS at 03:04

## 2025-02-25 RX ADMIN — ACETAMINOPHEN 650 MG: 325 TABLET, FILM COATED ORAL at 20:27

## 2025-02-25 RX ADMIN — PERFLUTREN 10 ML OF DILUTION: 6.52 INJECTION, SUSPENSION INTRAVENOUS at 14:56

## 2025-02-25 RX ADMIN — NOREPINEPHRINE BITARTRATE 0.24 MCG/KG/MIN: 32 SOLUTION INTRAVENOUS at 14:45

## 2025-02-25 RX ADMIN — NOREPINEPHRINE BITARTRATE 0.19 MCG/KG/MIN: 32 SOLUTION INTRAVENOUS at 23:07

## 2025-02-25 RX ADMIN — INSULIN LISPRO 1 UNITS: 100 INJECTION, SOLUTION INTRAVENOUS; SUBCUTANEOUS at 08:00

## 2025-02-25 RX ADMIN — AMIODARONE HYDROCHLORIDE 150 MG: 1.5 INJECTION, SOLUTION INTRAVENOUS at 03:09

## 2025-02-25 RX ADMIN — VASOPRESSIN 0.03 UNITS/MIN: 0.2 INJECTION INTRAVENOUS at 09:39

## 2025-02-25 RX ADMIN — AMIODARONE HYDROCHLORIDE 0.5 MG/MIN: 1.8 INJECTION, SOLUTION INTRAVENOUS at 09:38

## 2025-02-25 RX ADMIN — VASOPRESSIN 0.03 UNITS/MIN: 0.2 INJECTION INTRAVENOUS at 00:32

## 2025-02-25 RX ADMIN — HEPARIN SODIUM 900 UNITS/HR: 10000 INJECTION, SOLUTION INTRAVENOUS at 04:43

## 2025-02-25 RX ADMIN — NYSTATIN 1 APPLICATION: 100000 POWDER TOPICAL at 21:09

## 2025-02-25 RX ADMIN — Medication 1 TABLET: at 11:39

## 2025-02-25 RX ADMIN — CEFEPIME 1 G: 1 INJECTION, POWDER, FOR SOLUTION INTRAMUSCULAR; INTRAVENOUS at 04:58

## 2025-02-25 RX ADMIN — NYSTATIN 1 APPLICATION: 100000 POWDER TOPICAL at 08:47

## 2025-02-25 RX ADMIN — PANTOPRAZOLE SODIUM 40 MG: 40 INJECTION, POWDER, FOR SOLUTION INTRAVENOUS at 08:47

## 2025-02-25 RX ADMIN — CEFEPIME 1 G: 1 INJECTION, POWDER, FOR SOLUTION INTRAMUSCULAR; INTRAVENOUS at 16:29

## 2025-02-25 RX ADMIN — AMIODARONE HYDROCHLORIDE 150 MG: 1.5 INJECTION, SOLUTION INTRAVENOUS at 00:20

## 2025-02-25 RX ADMIN — ACETAMINOPHEN 650 MG: 325 TABLET, FILM COATED ORAL at 08:00

## 2025-02-25 RX ADMIN — THIAMINE HYDROCHLORIDE 500 MG: 100 INJECTION, SOLUTION INTRAMUSCULAR; INTRAVENOUS at 11:39

## 2025-02-25 RX ADMIN — SODIUM CHLORIDE, POTASSIUM CHLORIDE, SODIUM LACTATE AND CALCIUM CHLORIDE 1000 ML: 600; 310; 30; 20 INJECTION, SOLUTION INTRAVENOUS at 00:27

## 2025-02-25 RX ADMIN — DEXTROSE AND SODIUM CHLORIDE 100 ML/HR: 5; 450 INJECTION, SOLUTION INTRAVENOUS at 23:14

## 2025-02-25 RX ADMIN — AMIODARONE HYDROCHLORIDE 0.5 MG/MIN: 1.8 INJECTION, SOLUTION INTRAVENOUS at 18:37

## 2025-02-25 RX ADMIN — NOREPINEPHRINE BITARTRATE 0.25 MCG/KG/MIN: 32 SOLUTION INTRAVENOUS at 07:14

## 2025-02-25 SDOH — HEALTH STABILITY: MENTAL HEALTH
DO YOU FEEL STRESS - TENSE, RESTLESS, NERVOUS, OR ANXIOUS, OR UNABLE TO SLEEP AT NIGHT BECAUSE YOUR MIND IS TROUBLED ALL THE TIME - THESE DAYS?: PATIENT UNABLE TO ANSWER

## 2025-02-25 SDOH — SOCIAL STABILITY: SOCIAL INSECURITY
WITHIN THE LAST YEAR, HAVE YOU BEEN RAPED OR FORCED TO HAVE ANY KIND OF SEXUAL ACTIVITY BY YOUR PARTNER OR EX-PARTNER?: PATIENT UNABLE TO ANSWER

## 2025-02-25 SDOH — HEALTH STABILITY: PHYSICAL HEALTH: ON AVERAGE, HOW MANY MINUTES DO YOU ENGAGE IN EXERCISE AT THIS LEVEL?: PATIENT UNABLE TO ANSWER

## 2025-02-25 SDOH — HEALTH STABILITY: PHYSICAL HEALTH
ON AVERAGE, HOW MANY DAYS PER WEEK DO YOU ENGAGE IN MODERATE TO STRENUOUS EXERCISE (LIKE A BRISK WALK)?: PATIENT UNABLE TO ANSWER

## 2025-02-25 SDOH — SOCIAL STABILITY: SOCIAL INSECURITY: WITHIN THE LAST YEAR, HAVE YOU BEEN AFRAID OF YOUR PARTNER OR EX-PARTNER?: PATIENT UNABLE TO ANSWER

## 2025-02-25 SDOH — ECONOMIC STABILITY: FOOD INSECURITY
WITHIN THE PAST 12 MONTHS, THE FOOD YOU BOUGHT JUST DIDN'T LAST AND YOU DIDN'T HAVE MONEY TO GET MORE.: PATIENT UNABLE TO ANSWER

## 2025-02-25 SDOH — SOCIAL STABILITY: SOCIAL INSECURITY
WITHIN THE LAST YEAR, HAVE YOU BEEN KICKED, HIT, SLAPPED, OR OTHERWISE PHYSICALLY HURT BY YOUR PARTNER OR EX-PARTNER?: PATIENT UNABLE TO ANSWER

## 2025-02-25 SDOH — ECONOMIC STABILITY: INCOME INSECURITY
IN THE PAST 12 MONTHS HAS THE ELECTRIC, GAS, OIL, OR WATER COMPANY THREATENED TO SHUT OFF SERVICES IN YOUR HOME?: PATIENT UNABLE TO ANSWER

## 2025-02-25 SDOH — SOCIAL STABILITY: SOCIAL INSECURITY
WITHIN THE LAST YEAR, HAVE YOU BEEN HUMILIATED OR EMOTIONALLY ABUSED IN OTHER WAYS BY YOUR PARTNER OR EX-PARTNER?: PATIENT UNABLE TO ANSWER

## 2025-02-25 SDOH — ECONOMIC STABILITY: FOOD INSECURITY
WITHIN THE PAST 12 MONTHS, YOU WORRIED THAT YOUR FOOD WOULD RUN OUT BEFORE YOU GOT THE MONEY TO BUY MORE.: PATIENT UNABLE TO ANSWER

## 2025-02-25 ASSESSMENT — PAIN DESCRIPTION - DESCRIPTORS
DESCRIPTORS: TENDER
DESCRIPTORS: TENDER

## 2025-02-25 ASSESSMENT — PAIN - FUNCTIONAL ASSESSMENT
PAIN_FUNCTIONAL_ASSESSMENT: 0-10

## 2025-02-25 ASSESSMENT — PAIN SCALES - GENERAL
PAINLEVEL_OUTOF10: 4
PAINLEVEL_OUTOF10: 5 - MODERATE PAIN
PAINLEVEL_OUTOF10: 4
PAINLEVEL_OUTOF10: 6

## 2025-02-25 ASSESSMENT — ACTIVITIES OF DAILY LIVING (ADL): LACK_OF_TRANSPORTATION: PATIENT UNABLE TO ANSWER

## 2025-02-25 NOTE — PROGRESS NOTES
NEPHROLOGY PROGRESS NOTE    REASON FOR CONSULT: DIMITRI and hyperkalemia    SUBJECTIVE:  Patient ate his breakfast.  He feels better has some abdominal discomfort around the ostomy site.  Has been making more urine.  Denies any trouble breathing.      OBJECTIVE:    Visit Vitals  /57   Pulse 79   Temp 36.2 °C (97.2 °F) (Temporal)   Resp 17          Intake/Output Summary (Last 24 hours) at 2/25/2025 0836  Last data filed at 2/25/2025 0650  Gross per 24 hour   Intake 810.97 ml   Output 1525 ml   Net -714.03 ml        General: Awake and Alert, In no distress, Cooperative  HEENT: Oral mucosa moist, EOMI  NECK: Supple  CHEST: No crackles, no wheeze, no tachypnea  CVS: S1,S2 heard, no rubs, irregularly irregular  ABD: Soft, Non Tender, BS present, ostomy  EXT: no edema    MEDICATION:    Scheduled medications  [Held by provider] apixaban, 5 mg, oral, BID  cefepime, 1 g, intravenous, q12h  insulin lispro, 0-5 Units, subcutaneous, TID AC  [Held by provider] metoprolol tartrate, 25 mg, oral, BID  nystatin, 1 Application, Topical, BID  pantoprazole, 40 mg, intravenous, Daily  [Held by provider] sacubitriL-valsartan, 1 tablet, oral, BID  sodium polystyrene, 15 g, oral, Once  [Held by provider] tamsulosin, 0.4 mg, oral, Daily      Continuous medications  amiodarone, 1 mg/min, Last Rate: 1 mg/min (02/25/25 0523)   Followed by  amiodarone, 0.5 mg/min  dextrose 5%, 100 mL/hr, Last Rate: 100 mL/hr (02/25/25 0523)  heparin, 0-4,000 Units/hr, Last Rate: 900 Units/hr (02/25/25 0650)  norepinephrine, 0-1 mcg/kg/min, Last Rate: 0.25 mcg/kg/min (02/25/25 0714)  [Held by provider] sodium bicarbonate 1 mEq/mL (8.4 %) 150 mEq in dextrose 5% 1,150 mL infusion, 100 mL/hr, Last Rate: Stopped (02/24/25 2125)  [Held by provider] sodium chloride 0.9%, 100 mL/hr, Last Rate: Stopped (02/24/25 2125)  vasopressin, 0.03 Units/min, Last Rate: 0.03 Units/min (02/25/25 0523)      PRN medications  PRN medications: acetaminophen **OR** acetaminophen **OR**  acetaminophen, alteplase, ipratropium-albuteroL, morphine, [Held by provider] nitroglycerin, ondansetron **OR** ondansetron, vancomycin     RESULTS:    Lab Results   Component Value Date    WBC 8.7 02/25/2025    HGB 7.8 (L) 02/25/2025    HCT 22.9 (L) 02/25/2025    MCV 80 02/25/2025     02/25/2025        Lab Results   Component Value Date    CREATININE 2.94 (H) 02/25/2025    BUN 74 (H) 02/25/2025     (L) 02/25/2025    K 4.4 02/25/2025    CL 94 (L) 02/25/2025    CO2 24 02/25/2025        Radiology Imaging reviewed      ASSESSMENT/PLAN:     1.  DIMITRI: Patient's renal function has improved.  Peak creatinine was 5.62.  Current creatinine at 2.94.  He received IV fluid bolus.  He is on 2 pressors.  Urine output has picked up.  No urgent need for RRT.  No additional IV fluid needed.  Trend renal panel.  Avoid nephrotoxic agents.    2.  Hyperkalemia: This has resolved potassium level was 7.1 and has improved to 4.4.  He received bicarbonate, Kayexalate.  Continue to hold Entresto Aldactone    3.  Acidosis: This has resolved with bicarbonate level at 24.    4.  Hyponatremia: Serum sodium currently at 127.  Initial serum sodium was 118.  He received D5W to avoid rapid correction.    5.  Hypotension: Likely hypovolemic responded to IV fluid and on 2 pressors.      Thank You very much for allowing me to participate in the care of this Patient    This document was created using dragon dictation and may contain unintended error    Bunny London MD   02/25/25

## 2025-02-25 NOTE — CARE PLAN
The clinical goals for the shift include  pt will maintain hemodynamic stability, ostomy will be free from leakage.     Problem: Pain - Adult  Goal: Verbalizes/displays adequate comfort level or baseline comfort level  Outcome: Progressing     Problem: Safety - Adult  Goal: Free from fall injury  Outcome: Progressing     Problem: Discharge Planning  Goal: Discharge to home or other facility with appropriate resources  Outcome: Progressing     Problem: Chronic Conditions and Co-morbidities  Goal: Patient's chronic conditions and co-morbidity symptoms are monitored and maintained or improved  Outcome: Progressing     Problem: Nutrition  Goal: Nutrient intake appropriate for maintaining nutritional needs  Outcome: Progressing     Problem: Skin  Goal: Decreased wound size/increased tissue granulation at next dressing change  Outcome: Progressing  Goal: Participates in plan/prevention/treatment measures  Outcome: Progressing  Goal: Prevent/manage excess moisture  Outcome: Progressing  Goal: Prevent/minimize sheer/friction injuries  Outcome: Progressing  Goal: Promote/optimize nutrition  Outcome: Progressing  Goal: Promote skin healing  Outcome: Progressing     Problem: Skin  Goal: Participates in plan/prevention/treatment measures  Outcome: Progressing     Problem: Fall/Injury  Goal: Not fall by end of shift  Outcome: Progressing  Goal: Be free from injury by end of the shift  Outcome: Progressing  Goal: Verbalize understanding of personal risk factors for fall in the hospital  Outcome: Progressing  Goal: Verbalize understanding of risk factor reduction measures to prevent injury from fall in the home  Outcome: Progressing  Goal: Use assistive devices by end of the shift  Outcome: Progressing  Goal: Pace activities to prevent fatigue by end of the shift  Outcome: Progressing     Problem: Pain  Goal: Takes deep breaths with improved pain control throughout the shift  Outcome: Progressing  Goal: Turns in bed with improved  pain control throughout the shift  Outcome: Progressing  Goal: Walks with improved pain control throughout the shift  Outcome: Progressing  Goal: Performs ADL's with improved pain control throughout shift  Outcome: Progressing  Goal: Participates in PT with improved pain control throughout the shift  Outcome: Progressing  Goal: Free from opioid side effects throughout the shift  Outcome: Progressing  Goal: Free from acute confusion related to pain meds throughout the shift  Outcome: Progressing

## 2025-02-25 NOTE — PROGRESS NOTES
Esvin Gupta is a 64 y.o. male on day 1 of admission presenting with Acute renal failure, unspecified acute renal failure type (CMS-HCC).    Subjective   Patient lying in bed with only complaint of tenderness around ostomy site and area of dermatitis to right abdomen. Reports that he feels better today.  Patient denies any nausea or vomiting and is tolerating diet well.  SAMRA last night placed DuoDERM around ostomy bag and there is calcium alginate over the reddened areas to the right abdomen.  Patient is on levo, vaso, and amio drips due to hypotension and going into A-fib with RVR.  BUN/creatinine improved today but still significantly elevated.  H&H decreased to 7.8/22.9.     Objective     Physical Exam:  Constitutional:    Thin, chronically-ill appearing male patient resting comfortably, no acute distress  HENT:     dry mucous membranes, temporal muscle wasting   Eyes:      sclera white  Cardiovascular:      irregular heart rate, hypotension   Pulmonary:      3L NC, non-labored  Abdominal:      Tenderness to excoriated area around ostomy but no other tenderness to abdomen. Bowel sounds hypoactive. RLQ ileostomy- pink and moist- greenish brown drainage in ostomy bag.  Duoderm around ostomy appliance and no leakage noted. Midline scar present- no skin opening. Left upper quadrant PEG- site c,d,I without drainage. Secured with lange  Skin:     No pallor. Contact dermatitis RLQ with areas of sloughed epidermis. No active bleeding at this time and redness has improved some since yesterday. Calcium alginate to reddened area to right abdomen  Extremities:     Extremely thin, foot drop  Neurological:      A&Ox3  Psychiatric:       Appropriate mood and behavior    Last Recorded Vitals  Blood pressure 90/51, pulse 98, temperature 36.2 °C (97.2 °F), temperature source Temporal, resp. rate 17, height 1.829 m (6'), weight 72.6 kg (160 lb), SpO2 (!) 73%.  Intake/Output last 3 Shifts:  I/O last 3 completed shifts:  In:  811 (11.2 mL/kg) [I.V.:811 (11.2 mL/kg)]  Out: 1525 (21 mL/kg) [Urine:1475 (0.6 mL/kg/hr); Stool:50]  Weight: 72.6 kg     Relevant Results  Results for orders placed or performed during the hospital encounter of 02/24/25 (from the past 24 hours)   CBC and Auto Differential   Result Value Ref Range    WBC 9.9 4.4 - 11.3 x10*3/uL    nRBC 0.0 0.0 - 0.0 /100 WBCs    RBC 3.47 (L) 4.50 - 5.90 x10*6/uL    Hemoglobin 9.5 (L) 13.5 - 17.5 g/dL    Hematocrit 27.6 (L) 41.0 - 52.0 %    MCV 80 80 - 100 fL    MCH 27.4 26.0 - 34.0 pg    MCHC 34.4 32.0 - 36.0 g/dL    RDW 16.3 (H) 11.5 - 14.5 %    Platelets 457 (H) 150 - 450 x10*3/uL    Neutrophils % 57.2 40.0 - 80.0 %    Immature Granulocytes %, Automated 0.8 0.0 - 0.9 %    Lymphocytes % 29.4 13.0 - 44.0 %    Monocytes % 11.6 2.0 - 10.0 %    Eosinophils % 0.8 0.0 - 6.0 %    Basophils % 0.2 0.0 - 2.0 %    Neutrophils Absolute 5.65 1.20 - 7.70 x10*3/uL    Immature Granulocytes Absolute, Automated 0.08 0.00 - 0.70 x10*3/uL    Lymphocytes Absolute 2.90 1.20 - 4.80 x10*3/uL    Monocytes Absolute 1.15 (H) 0.10 - 1.00 x10*3/uL    Eosinophils Absolute 0.08 0.00 - 0.70 x10*3/uL    Basophils Absolute 0.02 0.00 - 0.10 x10*3/uL   Comprehensive Metabolic Panel   Result Value Ref Range    Glucose 112 (H) 74 - 99 mg/dL    Sodium 118 (LL) 136 - 145 mmol/L    Potassium 7.3 (HH) 3.5 - 5.3 mmol/L    Chloride 84 (L) 98 - 107 mmol/L    Bicarbonate 18 (L) 21 - 32 mmol/L    Anion Gap 23 (H) 10 - 20 mmol/L    Urea Nitrogen 120 (HH) 6 - 23 mg/dL    Creatinine 5.62 (H) 0.50 - 1.30 mg/dL    eGFR 11 (L) >60 mL/min/1.73m*2    Calcium 9.8 8.6 - 10.3 mg/dL    Albumin 3.9 3.4 - 5.0 g/dL    Alkaline Phosphatase 188 (H) 33 - 136 U/L    Total Protein 7.8 6.4 - 8.2 g/dL    AST 11 9 - 39 U/L    Bilirubin, Total 0.4 0.0 - 1.2 mg/dL    ALT 18 10 - 52 U/L   Lactate   Result Value Ref Range    Lactate 0.7 0.4 - 2.0 mmol/L   Blood Culture    Specimen: Peripheral Venipuncture; Blood culture   Result Value Ref Range    Blood  Culture Loaded on Instrument - Culture in progress    Blood Culture    Specimen: Peripheral Venipuncture; Blood culture   Result Value Ref Range    Blood Culture Loaded on Instrument - Culture in progress    Magnesium   Result Value Ref Range    Magnesium 2.57 (H) 1.60 - 2.40 mg/dL   ECG 12 lead   Result Value Ref Range    Systolic blood pressure 74 mmHg    Diastolic blood pressure 55 mmHg    Ventricular Rate 140 BPM    Atrial Rate 288 BPM    QRS Duration 86 ms    QT Interval 288 ms    QTC Calculation(Bazett) 439 ms    R Axis 74 degrees    T Axis 79 degrees    QRS Count 23 beats    Q Onset 226 ms    T Offset 370 ms    QTC Fredericia 382 ms   BLOOD GAS VENOUS FULL PANEL   Result Value Ref Range    POCT pH, Venous 7.33 7.33 - 7.43 pH    POCT pCO2, Venous 36 (L) 41 - 51 mm Hg    POCT pO2, Venous 29 (L) 35 - 45 mm Hg    POCT SO2, Venous 32 (L) 45 - 75 %    POCT Oxy Hemoglobin, Venous 30.9 (L) 45.0 - 75.0 %    POCT Hematocrit Calculated, Venous 24.0 (L) 41.0 - 52.0 %    POCT Sodium, Venous 121 (L) 136 - 145 mmol/L    POCT Potassium, Venous 6.8 (HH) 3.5 - 5.3 mmol/L    POCT Chloride, Venous 91 (L) 98 - 107 mmol/L    POCT Ionized Calicum, Venous 1.21 1.10 - 1.33 mmol/L    POCT Glucose, Venous 98 74 - 99 mg/dL    POCT Lactate, Venous 2.0 0.4 - 2.0 mmol/L    POCT Base Excess, Venous -6.3 (L) -2.0 - 3.0 mmol/L    POCT HCO3 Calculated, Venous 19.0 (L) 22.0 - 26.0 mmol/L    POCT Hemoglobin, Venous 7.9 (L) 13.5 - 17.5 g/dL    POCT Anion Gap, Venous 18.0 10.0 - 25.0 mmol/L    Patient Temperature 37.0 degrees Celsius    FiO2 21 %    Critical Called By SUZANNE TORRES RRT     Critical Called To DR FLOWER     Critical Call Time 1342     Critical Read Back Y    Urinalysis with Reflex Culture and Microscopic   Result Value Ref Range    Color, Urine Light-Yellow Light-Yellow, Yellow, Dark-Yellow    Appearance, Urine Clear Clear    Specific Gravity, Urine 1.009 1.005 - 1.035    pH, Urine 7.0 5.0, 5.5, 6.0, 6.5, 7.0, 7.5, 8.0    Protein,  Urine 20 (TRACE) NEGATIVE, 10 (TRACE), 20 (TRACE) mg/dL    Glucose, Urine 70 (1+) (A) Normal mg/dL    Blood, Urine NEGATIVE NEGATIVE mg/dL    Ketones, Urine NEGATIVE NEGATIVE mg/dL    Bilirubin, Urine NEGATIVE NEGATIVE mg/dL    Urobilinogen, Urine Normal Normal mg/dL    Nitrite, Urine NEGATIVE NEGATIVE    Leukocyte Esterase, Urine 25 Patty/uL (A) NEGATIVE   Extra Urine Gray Tube   Result Value Ref Range    Extra Tube Hold for add-ons.    Microscopic Only, Urine   Result Value Ref Range    WBC, Urine 11-20 (A) 1-5, NONE /HPF    RBC, Urine NONE NONE, 1-2, 3-5 /HPF   POCT GLUCOSE   Result Value Ref Range    POCT Glucose 105 (H) 74 - 99 mg/dL   Basic Metabolic Panel   Result Value Ref Range    Glucose 113 (H) 74 - 99 mg/dL    Sodium 124 (L) 136 - 145 mmol/L    Potassium 6.1 (HH) 3.5 - 5.3 mmol/L    Chloride 88 (L) 98 - 107 mmol/L    Bicarbonate 19 (L) 21 - 32 mmol/L    Anion Gap 23 (H) 10 - 20 mmol/L    Urea Nitrogen 102 (HH) 6 - 23 mg/dL    Creatinine 4.72 (H) 0.50 - 1.30 mg/dL    eGFR 13 (L) >60 mL/min/1.73m*2    Calcium 10.3 8.6 - 10.3 mg/dL   Cortisol   Result Value Ref Range    Cortisol 21.5 (H) 2.5 - 20.0 ug/dL   Basic metabolic panel   Result Value Ref Range    Glucose 116 (H) 74 - 99 mg/dL    Sodium 128 (L) 136 - 145 mmol/L    Potassium 4.4 3.5 - 5.3 mmol/L    Chloride 93 (L) 98 - 107 mmol/L    Bicarbonate 23 21 - 32 mmol/L    Anion Gap 16 10 - 20 mmol/L    Urea Nitrogen 82 (H) 6 - 23 mg/dL    Creatinine 3.35 (H) 0.50 - 1.30 mg/dL    eGFR 20 (L) >60 mL/min/1.73m*2    Calcium 9.3 8.6 - 10.3 mg/dL   Magnesium   Result Value Ref Range    Magnesium 1.79 1.60 - 2.40 mg/dL   CBC   Result Value Ref Range    WBC 8.7 4.4 - 11.3 x10*3/uL    nRBC 0.0 0.0 - 0.0 /100 WBCs    RBC 2.85 (L) 4.50 - 5.90 x10*6/uL    Hemoglobin 7.8 (L) 13.5 - 17.5 g/dL    Hematocrit 22.9 (L) 41.0 - 52.0 %    MCV 80 80 - 100 fL    MCH 27.4 26.0 - 34.0 pg    MCHC 34.1 32.0 - 36.0 g/dL    RDW 15.9 (H) 11.5 - 14.5 %    Platelets 387 150 - 450 x10*3/uL    Comprehensive Metabolic Panel   Result Value Ref Range    Glucose 155 (H) 74 - 99 mg/dL    Sodium 127 (L) 136 - 145 mmol/L    Potassium 4.4 3.5 - 5.3 mmol/L    Chloride 94 (L) 98 - 107 mmol/L    Bicarbonate 24 21 - 32 mmol/L    Anion Gap 13 10 - 20 mmol/L    Urea Nitrogen 74 (H) 6 - 23 mg/dL    Creatinine 2.94 (H) 0.50 - 1.30 mg/dL    eGFR 23 (L) >60 mL/min/1.73m*2    Calcium 9.2 8.6 - 10.3 mg/dL    Albumin 3.1 (L) 3.4 - 5.0 g/dL    Alkaline Phosphatase 138 (H) 33 - 136 U/L    Total Protein 6.1 (L) 6.4 - 8.2 g/dL    AST 10 9 - 39 U/L    Bilirubin, Total 0.7 0.0 - 1.2 mg/dL    ALT 16 10 - 52 U/L   POCT GLUCOSE   Result Value Ref Range    POCT Glucose 181 (H) 74 - 99 mg/dL     ECG 12 lead    Result Date: 2/25/2025  Atrial fibrillation with rapid ventricular response Indeterminate axis Nonspecific ST abnormality Abnormal ECG When compared with ECG of 24-FEB-2025 18:58, (unconfirmed) Atrial fibrillation has replaced Sinus rhythm Vent. rate has increased BY  68 BPM Questionable change in QRS duration Criteria for Septal infarct are no longer Present    XR chest 1 view    Result Date: 2/25/2025  Interpreted By:  Kevin Willis, STUDY: XR CHEST 1 VIEW;  2/24/2025 11:26 pm   INDICATION: Signs/Symptoms:line placement.   COMPARISON: 2/24/2025   ACCESSION NUMBER(S): [id number]   ORDERING CLINICIAN: TALAT BALBUENA   FINDINGS: A right central venous catheter tip current at the level of the distal innominate vein. The cardiac silhouette is normal in size. Stable retrocardiac opacity. No significant pleural effusion. No pneumothorax. No pneumothorax.       A right central venous catheter tip terminates at the level of the distal right innominate vein.   Stable retrocardiac opacity may correspond atelectasis or infiltrate.   MACRO: None   Signed by: Kevin Willis 2/25/2025 1:27 AM Dictation workstation:   [id number]    XR chest 1 view    Result Date: 2/24/2025  Interpreted By:  Dylan Corrales, STUDY: XR CHEST 1 VIEW; 2/24/2025  3:52 pm   INDICATION: Signs/Symptoms:hypoxia.   COMPARISON: None   ACCESSION NUMBER(S): [id number]   ORDERING CLINICIAN: RAULITO FLOWER   TECHNIQUE: 1 view of the chest was performed.   FINDINGS: Left retrocardiac small opacity possibly atelectasis or crowded shadows. No pleural effusion. No pneumothorax.  The cardiomediastinal silhouette is within normal limits.       1. Small left retrocardiac opacity possibly atelectasis or crowded shadows. No major infiltrate or pleural effusion.   Signed by: Dylan Corrales 2/24/2025 4:25 PM Dictation workstation:   [id number]         Assessment/Plan   Esvin Gupta is a 64 y.o. male who presented to [facility] ED on 2/24 via EMS from [facility] for evaluation of a wound around his ileostomy. Patient has a history of stage II Colon cancer s/p planned laparoscopic right colectomy on 12/11/2024 with Dr. Montalvo at [facility]. Postoperative course complicated by perforated jejunal diverticulum with feculent peritonitis requiring take-back to OR, exploratory laparotomy, and ultimately diverting ileostomy with staged midline wound closure. His hospital course was complicated by acute respiratory failure with vent dependence s/p Trach/PEG. He required LTAC for rehabilitation for several months. He was ultimately decannulated from trach. Discharged to SNF on 2/17/25. Has been eating regular diet with supplemental nocturnal TF. Patient states that for the last 3 days the nursing home has not had the proper size ostomy bag. As a result, his ileostomy has been leaking all over his skin. Patient has extensive contact dermatitis from stool contamination. There is raw/bleeding skin in dependent portions of his RLQ abdomen.     Assessment & Plan      Impression:  Contact dermatitis RLQ abdomen from improper ostomy care    > this is an extensive wound and will take some time to heal. Skin needs to remain clean, dry, and completely free from stool. Ensuring proper ostomy pouch fit  is essential.       Recommendations:  - Continue Convex wafer as the initial trial of flat wafer yielded leaking of stool from lateral portion of ostomy pouch  - Continue with Duoderm around the appliance to protect surrounding excoriated skin and facilitate seal  - avoid Stoma stewart as this may burn/exacerbate patient's pain  - always use No sting barrier wipes around ostomy prior to bag application   - if possible, have patient lay on left side to minimize and lateral leaking of stool  - Remainder of care per primary team (no abx needed from surgery standpoint)       The patient was seen with the attending surgeon Dr. John Mcbride, APRN-CNP

## 2025-02-25 NOTE — H&P
SUBJECTIVE     Overnight there are many developments with patient.  Central line was placed due to increasing Levophed requirements and addition of vasopressin.  Overnight patient was noted to be in A-fib with RVR and was placed on heparin drip and given amiodarone.  Overnight patient's fluids were adjusted in accordance to sodium levels.  Upon questioning, patient reports feeling improved today, is having less pain over ostomy.    OBJECTIVE     Physical Exam:  General: Thin appearing  HEENT:  Normocephalic, atraumatic  Chest:  Clear to auscultation bilaterally. No wheezes, rales, or rhonchi.  CV:  Regular rate and rhythm. No murmurs    Abdomen: Abdomen is soft, tender to palpation, ostomy present  Extremities:  No lower extremity edema or cyanosis.   Neurological:  AAOx3. No focal deficits.  Skin: Dry skin    ASSESSMENT & PLAN     Esvin Gupta is a 64 y.o. year old male with a history of hypertension, pulmonary hypertension hyperlipidemia, paroxysmal A-fib on Eliquis, nonischemic cardiomyopathy, CHF, recent colectomy/ileostomy creation secondary to stage II colon cancer, RA, history of alcohol/tobacco use.  Patient presented to the ED due to a rash and pain throughout colostomy site.  Patient admitted to ICU for septic shock secondary to abdominal infection, DIMITRI, and several electrolyte normalities.    Daily updates: Patient started on amnio/heparin drip in regards to A-fib, switch to half NS, ordered echo, will recheck mag/Phos in morning to assess for refeeding syndrome, pressor requirements worsening unclear etiology at this time    Neuro/Constitutional  #Pain over ostomy site  -Patient orientated to person place and time  PLAN:  -Analgesia: Tylenol mild, morphine severe    Cardiovascular  #Mixed Shock secondary to hypovolemia and sepsis  #A-fib with RVR  #HFrEF  #Nonischemic cardiomyopathy  #History of pulmonary hypertension, hyperlipidemia, hypertension  -Last echo 1/1/2025: EF 30-35%  PLAN:  -Continue  with Levophed and vasopressin, wean as tolerated, maintain MAP>65  -Continue maintenance fluids per nephro section  -Patient started on amnio and heparin drip in setting of A-fib with RVR  -Holding home blood pressure meds  -Ordered follow-up echocardiogram to reassess heart function    Pulmonary  #No acute issues  PLAN:  -Not currently requiring O2     GI  #Colostomy/ileostomy creation secondary to stage II colon cancer  #Concern for abdominal infection (appearing less likely)  #Protein calorie malnutrition  #Refeeding syndrome?  PLAN:  -Consulted surgery, no acute inventions at this time  -Consulted dietitian/nutritionalist  -Resumed renal diet, added multivitamin  -Continue Protonix IV    Renal  #Likely prerenal DIMITRI  #Electrolyte disturbances:       #Hyperkalemia (resolved)       # Hyponatremia (improving)  -Baseline CR 0.8  PLAN:  -Nephrology consulted, agrees with current plan  -Switch to half-normal saline fluids, discontinue bicarb    Endocrine  #No acute issues    Heme/Onc  #Thrombocytosis  PLAN:  -Continue to trend CBC, likely sign of inflammation    ID  #Concern for abdominal infection  -Culture Data: Follow-up blood cultures, wound cultures  PLAN:  -Continue cefepime/Vancomycin    Skin/MSK  #Ostomy irritation  PLAN:  -Continue wound care  -Nystatin added    ICU CHECK LIST  Antimicrobials: Cefepime/vancomycin  Oxygen: Oxygen as needed  Feeding: Renal diet  Drips: Levophed/vasopressin  Fluids: Half NS  Analgesia: -   Sedation: -  VTE ppx: Holding  GI ppx: PPx  Glycemic control: SSI  Bowel care: -  Indwelling catheters: -  Lines: Ostomy  Code Status: Full    Gulshan Dixon   PGY-II, Internal Medicine

## 2025-02-25 NOTE — CONSULTS
"Nutrition Initial Assessment:   Nutrition Assessment    Reason for Assessment: Admission nursing screening (MST=2 for weight loss)    Patient is a 64 y.o. male presenting with acute renal failure    Pmhx: HTN, HLD, AFIB, CHF, RA, DIMITRI, recent colectomy with ileostomy due to stage 2 colon cancer    Nutrition History:  Energy Intake:  (not established yet)  Pain affecting nutrition status: N/A  Food and Nutrient History: Pt was laying in bed sleeping, did not waken when name called out.  Per review of chart, s/p colectomy 12/11, back to OR for ileostomy and abdominal washout 12/18; PEG placed 12/24.  Pt has been at [facility]ACH from 1/7-2/17, then went to SNF until now.  Per RD note from LTAC on  2/12, Pt was receiving nocturnal TF from 6p-6a Jevity 1.5 @ 65, was eating ~50% of meals.. Per Chavo RN today, pt ate most of breakfast and appetite is better.  Since K+ is normal, would like to liberalize diet to limit any dietary restrictions. Will hold off on TF via PEG at this time, and order Nepro twice daily orally.  Vitamin/Herbal Supplement Use: none noted       Anthropometrics:  Height: 182.9 cm (6' 0.01\")   Weight: 72.6 kg (160 lb 0.9 oz)   BMI (Calculated): 21.7  IBW/kg (Dietitian Calculated): 80.9 kg  Percent of IBW: 90 %       Weight History:     Weight Change %:  Weight History / % Weight Change: 1/7 78.9kg (7% loss x1mo), 10/16 77.4kg, 8/15 77.7kg, 5/2 79.7kg, 4/15 78kg  Significant Weight Loss: Yes  Interpretation of Weight Loss: >5% in 1 month    Nutrition Focused Physical Exam Findings:    Subcutaneous Fat Loss:   Orbital Fat Pads: Severe (dark circles, hollowing and loose skin)  Buccal Fat Pads: Severe (hollow, sunken and narrow face)  Triceps: Defer  Ribs: Defer  Muscle Wasting:  Temporalis: Severe (hollowed scooping depression)  Pectoralis (Clavicular Region): Severe (protruding prominent clavicle)  Deltoid/Trapezius: Severe (squared shoulders, acromion process prominent)  Interosseous: " Defer  Trapezius/Infraspinatus/Supraspinatus (Scapular Region): Defer  Quadriceps: Defer  Gastrocnemius: Defer  Edema:  Edema: none  Physical Findings:  Skin: Positive  Positive Skin Findings: Pressure injury stage 2 (sacrum;  contact dermatitis RUQ)  Digestive System Findings: Loose stool (ileostomy/colostomy)    Nutrition Significant Labs:  CBC Trend:   Results from last 7 days   Lab Units 02/25/25 0437 02/24/25  1154   WBC AUTO x10*3/uL 8.7 9.9   RBC AUTO x10*6/uL 2.85* 3.47*   HEMOGLOBIN g/dL 7.8* 9.5*   HEMATOCRIT % 22.9* 27.6*   MCV fL 80 80   PLATELETS AUTO x10*3/uL 387 457*    , BMP Trend:   Results from last 7 days   Lab Units 02/25/25 0437 02/25/25 0149 02/24/25 1916 02/24/25  1154   GLUCOSE mg/dL 155* 116* 113* 112*   CALCIUM mg/dL 9.2 9.3 10.3 9.8   SODIUM mmol/L 127* 128* 124* 118*   POTASSIUM mmol/L 4.4 4.4 6.1* 7.3*   CO2 mmol/L 24 23 19* 18*   CHLORIDE mmol/L 94* 93* 88* 84*   BUN mg/dL 74* 82* 102* 120*   CREATININE mg/dL 2.94* 3.35* 4.72* 5.62*    , Renal Lab Trend:   Results from last 7 days   Lab Units 02/25/25 0437 02/25/25 0149 02/24/25 1916 02/24/25  1154   POTASSIUM mmol/L 4.4 4.4 6.1* 7.3*   SODIUM mmol/L 127* 128* 124* 118*   MAGNESIUM mg/dL 1.79  --   --  2.57*   EGFR mL/min/1.73m*2 23* 20* 13* 11*   BUN mg/dL 74* 82* 102* 120*   CREATININE mg/dL 2.94* 3.35* 4.72* 5.62*        Nutrition Specific Medications:  Reviewed     I/O:   Last BM Date: 02/24/25; Stool Appearance: Loose, Liquid (02/24/25 1832)    Dietary Orders (From admission, onward)       Start     Ordered    02/25/25 1103  Adult diet Regular  Diet effective now        Question:  Diet type  Answer:  Regular    02/25/25 1102    02/25/25 1102  Oral nutritional supplements  Until discontinued        Question Answer Comment   Deliver with Breakfast    Deliver with Dinner    Select supplement: Nepro With Carbsteady        02/25/25 1102    02/24/25 1553  May Not Participate in Room Service  ( ROOM SERVICE MAY NOT PARTICIPATE)   Once        Question:  .  Answer:  Yes    02/24/25 5899                     Estimated Needs:      Method for Estimating Needs: 2030-2325kcals (28-32kcals/kg ABW)     Method for Estimating 24 Hour Protein Needs: 87-109g (1.2-1.5g/g ABW) as renal function permits     Method for Estimating 24 Hour Fluid Needs: per MD  Patient on Order Fluid Restriction: No        Nutrition Diagnosis   Malnutrition Diagnosis  Patient has Malnutrition Diagnosis: Yes  Diagnosis Status: New  Malnutrition Diagnosis: Severe malnutrition related to chronic disease or condition  Related to: acute on chronic illness with stage 2 colon cancer  As Evidenced by: severe muscle wasting and severe subcutaneous fat loss; >5% weight loss x1mo    Nutrition Diagnosis  Patient has Nutrition Diagnosis: Yes  Diagnosis Status (1): New  Nutrition Diagnosis 1: Increased nutrient needs  Related to (1): physiological causes increasing nutrient needs  As Evidenced by (1): wound healing needs and colon cancer; lengthy hospitalization       Nutrition Interventions/Recommendations   Nutrition prescription for oral nutrition    Nutrition Recommendations:  Individualized Nutrition Prescription Provided for : Will liberalze diet to Regular and order Nepro shakes BID (monitor K+ levels and need for restriction)    Nutrition Interventions/Goals:   Interventions: Meals and snacks, Medical food supplement  Meals and Snacks: General healthful diet  Goal: consume >75% of meals  Medical Food Supplement: Commercial beverage medical food supplement therapy  Goal: consume >75% of Nepro BID (for an additional 420 kcals, 19 gm protein each)  Coordination of Care with Providers: Nursing (Chavo RN)  Additional Interventions: If oral intakes avg less than 75% of meals, will restart nocturnal TF via PEG      Education Documentation  No documentation found.     Will reassess education needs at follow up      Nutrition Monitoring and Evaluation   Food/Nutrient Related History  Monitoring  Monitoring and Evaluation Plan: Estimated Energy Intake, Fluid intake, Intake / amount of food  Estimated Energy Intake: Energy intake greater or equal to 75% of estimated energy needs  Fluid Intake: Estimated fluid intake  Intake / Amount of food: Consumes at least 75% or more of meals/snacks/supplements    Anthropometric Measurements  Monitoring and Evaluation Plan: Body weight  Body Weight: Body weight - Promote weight restoration, Body weight - Maintain stable weight    Biochemical Data, Medical Tests and Procedures  Monitoring and Evaluation Plan: Electrolyte/renal panel  Electrolyte and Renal Panel: GFR, Electrolytes within normal limits, Sodium, BUN, Creatinine, Potassium    Physical Exam Findings  Monitoring and Evaluation Plan: Adipose, Digestive System, Muscles, Skin  Adipose Finding: Loss of subcutaneous fat  Digestive System Finding: Loose stool  Criteria: functioning ostomy  Muscle Finding: Muscle atrophy  Skin Finding: Impaired wound healing - Improved wound healing, Imparied wound healing - Skin to heal  Other: promote healing through adequate nutrition    Goal Status: New goal(s) identified    Time Spent (min): 45 minutes

## 2025-02-25 NOTE — PROGRESS NOTES
Vancomycin Dosing by Pharmacy- FOLLOW UP    Esvin Gupta is a 64 y.o. year old male who Pharmacy has been consulted for vancomycin dosing for abdominal infection. Based on the patient's indication and renal status this patient is being dosed based on a goal trough/random level of 15-20.     Renal function is currently improving.      Visit Vitals  BP 93/54   Pulse 97   Temp 36.6 °C (97.9 °F) (Temporal)   Resp 19        Lab Results   Component Value Date    CREATININE 2.94 (H) 2025    CREATININE 3.35 (H) 2025    CREATININE 4.72 (H) 2025    CREATININE 5.62 (H) 2025        Patient weight is as follows:   Vitals:    25 1053   Weight: 72.6 kg (160 lb 0.9 oz)       Cultures:  No results found for the encounter in last 14 days.       I/O last 3 completed shifts:  In: 811 (11.2 mL/kg) [I.V.:811 (11.2 mL/kg)]  Out: 1525 (21 mL/kg) [Urine:1475 (0.6 mL/kg/hr); Stool:50]  Weight: 72.6 kg   I/O during current shift:  I/O this shift:  In: 541.5 [I.V.:541.5]  Out: 700 [Urine:700]    Temp (24hrs), Av.3 °C (97.3 °F), Min:35.9 °C (96.6 °F), Max:36.6 °C (97.9 °F)      Assessment/Plan    Current level is 11.9. Will give 1250mg x1 dose with a level in 24 hours to determine further dosing  The next level will be obtained on  at 1200. May be obtained sooner if clinically indicated.   Will continue to monitor renal function daily while on vancomycin and order serum creatinine at least every 48 hours if not already ordered.  Follow for continued vancomycin needs, clinical response, and signs/symptoms of toxicity.       Daniel J Weiland, PharmD

## 2025-02-25 NOTE — PROGRESS NOTES
General Surgery Attending Note    My Acute Care Surgery SAMRA (Advanced Practice Provider) evaluated this patient today.  Please see that documentation for details.    I saw the patient with the SAMRA today, and personally evaluated and examined the patient.  My findings are consistent with those of the SAMRA.        Impression:      Hospital day #2    Patient on IV Levophed, vasopressin, amiodarone, heparin, and IV fluids    Patient remains alert and conversant     0800 36.6 (97.9) 88 18 94/54 65 Abnormal    Abdomen reveals G-tube intact to the left upper quadrant; well-healed midline incision; the ileostomy stoma appliance is intact with a DuoDERM base; silver alginate on excoriated areas; erythema from contact dermatitis slightly improved; abdomen remains benign    Electrolytes and chemistries overall improved but not normal; BUN and creatinine 74 and 2.94; white blood cell count remains normal at 8.7; severe anemia at 7.8 and 22.9    General surgical impression is fecal contact dermatitis.      Plan:      Will continue current therapy and monitor with regard to the abdomen and stoma  No diet restrictions from a general surgical standpoint  Per critical care service and nephrology otherwise  We will follow

## 2025-02-25 NOTE — CARE PLAN
The patient's goals for the shift include      The clinical goals for the shift include hemodynamically stable    Over the shift, the patient did not make progress toward the following goals. Barriers to progression include . Recommendations to address these barriers include .

## 2025-02-25 NOTE — PROGRESS NOTES
02/25/25 1242   Discharge Planning   Living Arrangements Alone;Other (Comment)  (Currently at [facility] for skilled care)   Support Systems Family members  (Brother (Juan))   Assistance Needed iadls and driving (prior to recent hospitalization)   Type of Residence Private residence  (Currently at skilled facility for rehab after recent hospitalization)   Number of Stairs to Enter Residence 0  (Elevator access to residence)   Number of Stairs Within Residence 0   Do you have animals or pets at home? No   Home or Post Acute Services Post acute facilities (Rehab/SNF/etc)   Type of Post Acute Facility Services Skilled nursing   Expected Discharge Disposition SNF   Does the patient need discharge transport arranged? Yes   RoundTrip coordination needed? Yes   Has discharge transport been arranged? No   Patient Choice   Provider Choice list and CMS website (https://medicare.gov/care-compare#search) for post-acute Quality and Resource Measure Data were provided and reviewed with: Patient  (SNF list)   Patient / Family choosing to utilize agency / facility established prior to hospitalization No     Record reviewed.  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  This TCC rounded with ICU team.  RIJ placed overnight, on Levo and vasopressin for pressor support, and amiodarone and heparin gtt for episode of A-fib w/RVR.  TCC met with patient separately, at bedside, introduced self and explained role.  Demographic information and insurance verified.  Denies SW needs at this time.  Patient confirms he is currently at [facility] for skilled  care, and does not want to return at discharge.  Patient will need transportation arranged.  Disposition pending hospital course.  Will need new SNF list and PT/OT evals when medically stable and able to participate.  Care Transitions will continue to follow.

## 2025-02-25 NOTE — PROCEDURES
Central Line    Date/Time: 2/25/2025 2:35 AM    Performed by: Jersey Pena DO  Authorized by: Jersey Pena DO    Consent:     Consent obtained:  Verbal    Consent given by:  Patient    Risks, benefits, and alternatives were discussed: yes      Risks discussed:  Pneumothorax, infection, bleeding, arterial puncture and incorrect placement  Universal protocol:     Patient identity confirmed:  Verbally with patient  Pre-procedure details:     Indication(s): central venous access      Hand hygiene: Hand hygiene performed prior to insertion      Sterile barrier technique: All elements of maximal sterile technique followed      Skin preparation:  Chlorhexidine  Sedation:     Sedation type:  None  Anesthesia:     Anesthesia method:  Local infiltration    Local anesthetic:  Lidocaine 1% w/o epi  Procedure details:     Location:  R internal jugular    Patient position:  Supine    Procedural supplies:  Triple lumen    Catheter size:  13 Fr    Catheter length:  16    Landmarks identified: yes      Ultrasound guidance: yes      Ultrasound guidance timing: real time      Sterile ultrasound techniques: Sterile gel and sterile probe covers were used      Number of attempts:  1    Successful placement: yes    Post-procedure details:     Post-procedure:  Dressing applied and line sutured    Assessment:  Blood return through all ports, free fluid flow, placement verified by x-ray and no pneumothorax on x-ray    Procedure completion:  Tolerated

## 2025-02-26 LAB
ABO GROUP (TYPE) IN BLOOD: NORMAL
ABO GROUP (TYPE) IN BLOOD: NORMAL
ANION GAP SERPL CALC-SCNC: 12 MMOL/L (ref 10–20)
ANTIBODY SCREEN: NORMAL
ATRIAL RATE: 288 BPM
ATRIAL RATE: 72 BPM
BLOOD EXPIRATION DATE: NORMAL
BUN SERPL-MCNC: 32 MG/DL (ref 6–23)
CALCIUM SERPL-MCNC: 8.1 MG/DL (ref 8.6–10.3)
CHLORIDE SERPL-SCNC: 93 MMOL/L (ref 98–107)
CO2 SERPL-SCNC: 23 MMOL/L (ref 21–32)
CREAT SERPL-MCNC: 1.1 MG/DL (ref 0.5–1.3)
DIASTOLIC BLOOD PRESSURE: 52 MMHG
DIASTOLIC BLOOD PRESSURE: 55 MMHG
DISPENSE STATUS: NORMAL
EGFRCR SERPLBLD CKD-EPI 2021: 75 ML/MIN/1.73M*2
EJECTION FRACTION: 63 %
ERYTHROCYTE [DISTWIDTH] IN BLOOD BY AUTOMATED COUNT: 15.7 % (ref 11.5–14.5)
ERYTHROCYTE [DISTWIDTH] IN BLOOD BY AUTOMATED COUNT: 15.9 % (ref 11.5–14.5)
FERRITIN SERPL-MCNC: 531 NG/ML (ref 20–300)
FOLATE SERPL-MCNC: >24 NG/ML
GLUCOSE BLD MANUAL STRIP-MCNC: 109 MG/DL (ref 74–99)
GLUCOSE BLD MANUAL STRIP-MCNC: 88 MG/DL (ref 74–99)
GLUCOSE BLD MANUAL STRIP-MCNC: 91 MG/DL (ref 74–99)
GLUCOSE SERPL-MCNC: 125 MG/DL (ref 74–99)
HCT VFR BLD AUTO: 19.3 % (ref 41–52)
HCT VFR BLD AUTO: 22.8 % (ref 41–52)
HGB BLD-MCNC: 6.5 G/DL (ref 13.5–17.5)
HGB BLD-MCNC: 7.8 G/DL (ref 13.5–17.5)
IRON SATN MFR SERPL: 46 % (ref 25–45)
IRON SERPL-MCNC: 89 UG/DL (ref 35–150)
MAGNESIUM SERPL-MCNC: 1.29 MG/DL (ref 1.6–2.4)
MCH RBC QN AUTO: 27 PG (ref 26–34)
MCH RBC QN AUTO: 28.2 PG (ref 26–34)
MCHC RBC AUTO-ENTMCNC: 33.7 G/DL (ref 32–36)
MCHC RBC AUTO-ENTMCNC: 34.2 G/DL (ref 32–36)
MCV RBC AUTO: 80 FL (ref 80–100)
MCV RBC AUTO: 82 FL (ref 80–100)
NRBC BLD-RTO: 0 /100 WBCS (ref 0–0)
NRBC BLD-RTO: 0 /100 WBCS (ref 0–0)
P AXIS: 79 DEGREES
P OFFSET: 184 MS
P ONSET: 130 MS
PHOSPHATE SERPL-MCNC: 2.5 MG/DL (ref 2.5–4.9)
PLATELET # BLD AUTO: 289 X10*3/UL (ref 150–450)
PLATELET # BLD AUTO: 310 X10*3/UL (ref 150–450)
POTASSIUM SERPL-SCNC: 3.6 MMOL/L (ref 3.5–5.3)
PR INTERVAL: 200 MS
PRODUCT BLOOD TYPE: 5100
PRODUCT CODE: NORMAL
Q ONSET: 226 MS
Q ONSET: 230 MS
QRS COUNT: 12 BEATS
QRS COUNT: 23 BEATS
QRS DURATION: 66 MS
QRS DURATION: 86 MS
QT INTERVAL: 288 MS
QT INTERVAL: 356 MS
QTC CALCULATION(BAZETT): 389 MS
QTC CALCULATION(BAZETT): 439 MS
QTC FREDERICIA: 378 MS
QTC FREDERICIA: 382 MS
R AXIS: 73 DEGREES
R AXIS: 74 DEGREES
RBC # BLD AUTO: 2.41 X10*6/UL (ref 4.5–5.9)
RBC # BLD AUTO: 2.77 X10*6/UL (ref 4.5–5.9)
RH FACTOR (ANTIGEN D): NORMAL
RH FACTOR (ANTIGEN D): NORMAL
SODIUM SERPL-SCNC: 124 MMOL/L (ref 136–145)
SYSTOLIC BLOOD PRESSURE: 74 MMHG
SYSTOLIC BLOOD PRESSURE: 94 MMHG
T AXIS: 79 DEGREES
T AXIS: 83 DEGREES
T OFFSET: 370 MS
T OFFSET: 408 MS
TIBC SERPL-MCNC: 193 UG/DL (ref 240–445)
UFH PPP CHRO-ACNC: 0.4 IU/ML
UIBC SERPL-MCNC: 104 UG/DL (ref 110–370)
UNIT ABO: NORMAL
UNIT NUMBER: NORMAL
UNIT RH: NORMAL
UNIT VOLUME: 292
VANCOMYCIN SERPL-MCNC: 16.2 UG/ML (ref 5–20)
VENTRICULAR RATE: 140 BPM
VENTRICULAR RATE: 72 BPM
VIT B12 SERPL-MCNC: 746 PG/ML (ref 211–911)
WBC # BLD AUTO: 4.8 X10*3/UL (ref 4.4–11.3)
WBC # BLD AUTO: 7.1 X10*3/UL (ref 4.4–11.3)
XM INTEP: NORMAL

## 2025-02-26 PROCEDURE — 86901 BLOOD TYPING SEROLOGIC RH(D): CPT

## 2025-02-26 PROCEDURE — 2500000001 HC RX 250 WO HCPCS SELF ADMINISTERED DRUGS (ALT 637 FOR MEDICARE OP): Performed by: NURSE PRACTITIONER

## 2025-02-26 PROCEDURE — 83735 ASSAY OF MAGNESIUM: CPT

## 2025-02-26 PROCEDURE — 99231 SBSQ HOSP IP/OBS SF/LOW 25: CPT | Performed by: NURSE PRACTITIONER

## 2025-02-26 PROCEDURE — 37799 UNLISTED PX VASCULAR SURGERY: CPT

## 2025-02-26 PROCEDURE — 80048 BASIC METABOLIC PNL TOTAL CA: CPT

## 2025-02-26 PROCEDURE — 2020000001 HC ICU ROOM DAILY

## 2025-02-26 PROCEDURE — 99231 SBSQ HOSP IP/OBS SF/LOW 25: CPT | Performed by: SURGERY

## 2025-02-26 PROCEDURE — 86923 COMPATIBILITY TEST ELECTRIC: CPT

## 2025-02-26 PROCEDURE — 99291 CRITICAL CARE FIRST HOUR: CPT

## 2025-02-26 PROCEDURE — 2500000004 HC RX 250 GENERAL PHARMACY W/ HCPCS (ALT 636 FOR OP/ED)

## 2025-02-26 PROCEDURE — 36430 TRANSFUSION BLD/BLD COMPNT: CPT

## 2025-02-26 PROCEDURE — 85520 HEPARIN ASSAY: CPT | Performed by: STUDENT IN AN ORGANIZED HEALTH CARE EDUCATION/TRAINING PROGRAM

## 2025-02-26 PROCEDURE — 80202 ASSAY OF VANCOMYCIN: CPT | Performed by: INTERNAL MEDICINE

## 2025-02-26 PROCEDURE — 2500000001 HC RX 250 WO HCPCS SELF ADMINISTERED DRUGS (ALT 637 FOR MEDICARE OP)

## 2025-02-26 PROCEDURE — 83550 IRON BINDING TEST: CPT

## 2025-02-26 PROCEDURE — P9016 RBC LEUKOCYTES REDUCED: HCPCS

## 2025-02-26 PROCEDURE — 37799 UNLISTED PX VASCULAR SURGERY: CPT | Performed by: STUDENT IN AN ORGANIZED HEALTH CARE EDUCATION/TRAINING PROGRAM

## 2025-02-26 PROCEDURE — 82728 ASSAY OF FERRITIN: CPT | Mod: PARLAB

## 2025-02-26 PROCEDURE — 82947 ASSAY GLUCOSE BLOOD QUANT: CPT

## 2025-02-26 PROCEDURE — 85027 COMPLETE CBC AUTOMATED: CPT

## 2025-02-26 PROCEDURE — 84100 ASSAY OF PHOSPHORUS: CPT

## 2025-02-26 RX ORDER — ZINC OXIDE 20 G/100G
1 OINTMENT TOPICAL
Status: DISCONTINUED | OUTPATIENT
Start: 2025-02-26 | End: 2025-03-06 | Stop reason: HOSPADM

## 2025-02-26 RX ORDER — MAGNESIUM SULFATE HEPTAHYDRATE 40 MG/ML
2 INJECTION, SOLUTION INTRAVENOUS ONCE
Status: COMPLETED | OUTPATIENT
Start: 2025-02-26 | End: 2025-02-26

## 2025-02-26 RX ORDER — VANCOMYCIN HYDROCHLORIDE 1.25 G/250ML
1250 INJECTION, SOLUTION INTRAVITREAL EVERY 24 HOURS
Status: DISCONTINUED | OUTPATIENT
Start: 2025-02-26 | End: 2025-02-27

## 2025-02-26 RX ADMIN — VASOPRESSIN 0.03 UNITS/MIN: 0.2 INJECTION INTRAVENOUS at 00:42

## 2025-02-26 RX ADMIN — PANTOPRAZOLE SODIUM 40 MG: 40 INJECTION, POWDER, FOR SOLUTION INTRAVENOUS at 08:33

## 2025-02-26 RX ADMIN — Medication 1 TABLET: at 08:33

## 2025-02-26 RX ADMIN — MAGNESIUM SULFATE HEPTAHYDRATE 2 G: 40 INJECTION, SOLUTION INTRAVENOUS at 08:33

## 2025-02-26 RX ADMIN — NYSTATIN 1 APPLICATION: 100000 POWDER TOPICAL at 20:16

## 2025-02-26 RX ADMIN — ZINC OXIDE 1 APPLICATION: 200 OINTMENT TOPICAL at 20:16

## 2025-02-26 RX ADMIN — CEFEPIME 1 G: 1 INJECTION, POWDER, FOR SOLUTION INTRAMUSCULAR; INTRAVENOUS at 16:45

## 2025-02-26 RX ADMIN — NYSTATIN 1 APPLICATION: 100000 POWDER TOPICAL at 08:34

## 2025-02-26 RX ADMIN — CEFEPIME 1 G: 1 INJECTION, POWDER, FOR SOLUTION INTRAMUSCULAR; INTRAVENOUS at 04:08

## 2025-02-26 RX ADMIN — VANCOMYCIN HYDROCHLORIDE 1250 MG: 1.25 INJECTION, SOLUTION INTRAVITREAL at 16:44

## 2025-02-26 ASSESSMENT — PAIN SCALES - GENERAL
PAINLEVEL_OUTOF10: 0 - NO PAIN
PAINLEVEL_OUTOF10: 0 - NO PAIN
PAINLEVEL_OUTOF10: 4
PAINLEVEL_OUTOF10: 0 - NO PAIN

## 2025-02-26 ASSESSMENT — PAIN - FUNCTIONAL ASSESSMENT
PAIN_FUNCTIONAL_ASSESSMENT: 0-10

## 2025-02-26 NOTE — CARE PLAN
The clinical goals for the shift include wean levo, turn off vaso      Problem: Skin  Goal: Decreased wound size/increased tissue granulation at next dressing change  2/26/2025 0638 by Little Donnelly RN  Outcome: Progressing  Flowsheets (Taken 2/26/2025 0638)  Decreased wound size/increased tissue granulation at next dressing change:   Promote sleep for wound healing   Protective dressings over bony prominences   Utilize specialty bed per algorithm  2/26/2025 0637 by Little Donnelly RN  Outcome: Progressing  Goal: Participates in plan/prevention/treatment measures  2/26/2025 0638 by Little Donnelly RN  Outcome: Progressing  Flowsheets (Taken 2/25/2025 1111 by Chavo Galloway RN)  Participates in plan/prevention/treatment measures:   Discuss with provider PT/OT consult   Elevate heels   Increase activity/out of bed for meals  2/26/2025 0637 by Little Donnelly RN  Outcome: Progressing  Goal: Prevent/manage excess moisture  2/26/2025 0638 by Little Donnelly RN  Outcome: Progressing  Flowsheets (Taken 2/25/2025 1111 by Chavo Galloway RN)  Prevent/manage excess moisture:   Monitor for/manage infection if present   Use wicking fabric (obtain order)   Follow provider orders for dressing changes   Moisturize dry skin  2/26/2025 0637 by Little Donnelly RN  Outcome: Progressing  Goal: Prevent/minimize sheer/friction injuries  2/26/2025 0638 by Little Donnelly RN  Outcome: Progressing  Flowsheets (Taken 2/25/2025 1111 by Chavo Galloway RN)  Prevent/minimize sheer/friction injuries:   Complete micro-shifts as needed if patient unable. Adjust patient position to relieve pressure points, not a full turn   Increase activity/out of bed for meals   Turn/reposition every 2 hours/use positioning/transfer devices   HOB 30 degrees or less   Utilize specialty bed per algorithm   Use pull sheet  2/26/2025 0637 by Little Donnelly RN  Outcome: Progressing  Goal: Promote/optimize nutrition  2/26/2025  0638 by Little Donnelly RN  Outcome: Progressing  Flowsheets (Taken 2/25/2025 1111 by Chavo Galloway RN)  Promote/optimize nutrition:   Assist with feeding   Offer water/supplements/favorite foods   Consume > 50% meals/supplements  2/26/2025 0637 by Little Donnelly RN  Outcome: Progressing  Goal: Promote skin healing  2/26/2025 0638 by Little Donnelly RN  Outcome: Progressing  Flowsheets (Taken 2/25/2025 1111 by Chavo Galloway RN)  Promote skin healing:   Assess skin/pad under line(s)/device(s)   Protective dressings over bony prominences   Turn/reposition every 2 hours/use positioning/transfer devices   Ensure correct size (line/device) and apply per  instructions   Rotate device position/do not position patient on device  2/26/2025 0637 by Little oDnnelly RN  Outcome: Progressing

## 2025-02-26 NOTE — PROGRESS NOTES
Esvin Gupta is a 64 y.o. male on day 2 of admission presenting with Acute renal failure, unspecified acute renal failure type (CMS-HCC).  Record reviewed.  Patient still on IV pressure support.  Currently receiving a unit PRBC.  This TCC met with patient and brother, Toney, at bedside for discharge planning.  Anticipate patient will need to return to SNF.  Patient declining return to [place].  Patient requesting facility closer to his brothers, Toney, living in [facility], and Juan, in [place].  New SNF list using [place] [facility], [id number], given to patient's brother.  [place] of choice explained to patient and brother.  Will need PT/OT evals when medically stable and able to participate.  Provider messaged for orders.  Nursing updated.  Patient's emergency contact is Juanturner Gupta, [phone].  [facility] updated contact information in medical record.  Care Transitions will continue to follow.

## 2025-02-26 NOTE — PROGRESS NOTES
General Surgery Attending Note     My Acute Care Surgery SAMRA (Advanced Practice Provider) evaluated this patient today.  Please see that documentation for details.     I saw the patient with the SAMRA today, and personally evaluated and examined the patient.  My findings are consistent with those of the SAMRA.          Impression:       Hospital day #3     Remains in the heart center  Off pressors and amiodarone  On IV fluids and IV antibiotics including vancomycin and cefepime    Feels better  Tolerating regular diet and colostomy functioning     Patient remains alert and conversant     36.1 (78) 80 20 96/51     Abdomen reveals G-tube intact to the left upper quadrant; well-healed midline incision; the ileostomy stoma appliance is intact; barrier cream and Desitin on excoriated areas; erythema from contact dermatitis stable; abdomen remains benign     White blood cell count 7.1, H&H dropped to 6.5 and 19.3; electrolytes reviewed; BUN improved to 32 and creatinine normalized to 1.10     General surgical impression is fecal contact dermatitis.       Plan:       Will continue current therapy and monitor with regard to the abdomen and stoma  Per critical care service and nephrology otherwise  We will follow

## 2025-02-26 NOTE — PROGRESS NOTES
NEPHROLOGY PROGRESS NOTE    REASON FOR CONSULT: DIMITRI and hyperkalemia    SUBJECTIVE:  Patient is eating his breakfast.  He feels overall better.  No trouble breathing.  Good urine output.  Ostomy output has improved.    OBJECTIVE:    Visit Vitals  /59   Pulse 71   Temp 36.5 °C (97.7 °F) (Temporal)   Resp 17          Intake/Output Summary (Last 24 hours) at 2/26/2025 0832  Last data filed at 2/26/2025 0652  Gross per 24 hour   Intake 3476.22 ml   Output 2350 ml   Net 1126.22 ml        General: Awake and Alert, In no distress, Cooperative  HEENT: Oral mucosa moist, EOMI  NECK: Supple  CHEST: No crackles, no wheeze, no tachypnea  CVS: S1,S2 heard, no rubs, irregularly irregular  ABD: Soft, Non Tender, BS present, ostomy  EXT: no edema    MEDICATION:    Scheduled medications  [Held by provider] apixaban, 5 mg, oral, BID  cefepime, 1 g, intravenous, q12h  insulin lispro, 0-5 Units, subcutaneous, TID AC  magnesium sulfate, 2 g, intravenous, Once  [Held by provider] metoprolol tartrate, 25 mg, oral, BID  multivitamin with minerals, 1 tablet, oral, Daily  nystatin, 1 Application, Topical, BID  pantoprazole, 40 mg, intravenous, Daily  perflutren protein A microsphere, 0.5 mL, intravenous, Once in imaging  [Held by provider] sacubitriL-valsartan, 1 tablet, oral, BID  sodium polystyrene, 15 g, oral, Once  sulfur hexafluoride microsphr, 2 mL, intravenous, Once in imaging  [Held by provider] tamsulosin, 0.4 mg, oral, Daily      Continuous medications  [Held by provider] heparin, 0-4,000 Units/hr, Last Rate: Stopped (02/26/25 0652)  norepinephrine, 0-1 mcg/kg/min, Last Rate: 0.13 mcg/kg/min (02/26/25 0800)  [Held by provider] sodium bicarbonate 1 mEq/mL (8.4 %) 150 mEq in dextrose 5% 1,150 mL infusion, 100 mL/hr, Last Rate: Stopped (02/24/25 2125)  vasopressin, 0.03 Units/min, Last Rate: Stopped (02/26/25 0233)      PRN medications  PRN medications: acetaminophen **OR** acetaminophen **OR** acetaminophen, alteplase,  ipratropium-albuteroL, morphine, [Held by provider] nitroglycerin, ondansetron **OR** ondansetron, vancomycin     RESULTS:    Lab Results   Component Value Date    WBC 7.1 02/26/2025    HGB 6.5 (LL) 02/26/2025    HCT 19.3 (L) 02/26/2025    MCV 80 02/26/2025     02/26/2025        Lab Results   Component Value Date    CREATININE 1.10 02/26/2025    BUN 32 (H) 02/26/2025     (L) 02/26/2025    K 3.6 02/26/2025    CL 93 (L) 02/26/2025    CO2 23 02/26/2025        Radiology Imaging reviewed      ASSESSMENT/PLAN:     1.  DIMITRI: Patient's renal function has improved.  Peak creatinine was 5.62.  Current creatinine at 1.1.  He is currently on D5 half-normal saline.  Since his oral intake has been adequate we will discontinue IV fluids.  Keep MAP greater than 65 mmHg.    2.  Hyperkalemia: He is currently off the Entresto and Aldactone.  Has had tendency for hyperkalemia on those medications.  Current potassium level at 3.6.    3.   Hyponatremia: Initial serum sodium was 118 but there was some rapid correction thereby he was given D5W and now he is on D5 half-normal saline.  We can discontinue IV fluids.  Place him on fluid restriction of 1500 mL a day.      4.  Anemia: Hemoglobin is less than 7.  Will benefit from transfusion.  Likely in the setting of his intra-abdominal surgery and nutritional.    Thank You very much for allowing me to participate in the care of this Patient    This document was created using dragon dictation and may contain unintended error    Bunny London MD   02/26/25

## 2025-02-26 NOTE — CARE PLAN
The clinical goals for the shift include wean levo, turn off vaso    Problem: Pain - Adult  Goal: Verbalizes/displays adequate comfort level or baseline comfort level  Outcome: Progressing     Problem: Safety - Adult  Goal: Free from fall injury  Outcome: Progressing     Problem: Discharge Planning  Goal: Discharge to home or other facility with appropriate resources  Outcome: Progressing     Problem: Chronic Conditions and Co-morbidities  Goal: Patient's chronic conditions and co-morbidity symptoms are monitored and maintained or improved  Outcome: Progressing     Problem: Nutrition  Goal: Nutrient intake appropriate for maintaining nutritional needs  Outcome: Progressing     Problem: Skin  Goal: Decreased wound size/increased tissue granulation at next dressing change  Outcome: Progressing  Goal: Participates in plan/prevention/treatment measures  Outcome: Progressing  Goal: Prevent/manage excess moisture  Outcome: Progressing  Goal: Prevent/minimize sheer/friction injuries  Outcome: Progressing  Goal: Promote/optimize nutrition  Outcome: Progressing  Goal: Promote skin healing  Outcome: Progressing     Problem: Fall/Injury  Goal: Not fall by end of shift  Outcome: Progressing  Goal: Be free from injury by end of the shift  Outcome: Progressing  Goal: Verbalize understanding of personal risk factors for fall in the hospital  Outcome: Progressing  Goal: Verbalize understanding of risk factor reduction measures to prevent injury from fall in the home  Outcome: Progressing  Goal: Use assistive devices by end of the shift  Outcome: Progressing  Goal: Pace activities to prevent fatigue by end of the shift  Outcome: Progressing     Problem: Pain  Goal: Takes deep breaths with improved pain control throughout the shift  Outcome: Progressing  Goal: Turns in bed with improved pain control throughout the shift  Outcome: Progressing  Goal: Walks with improved pain control throughout the shift  Outcome: Progressing  Goal:  Performs ADL's with improved pain control throughout shift  Outcome: Progressing  Goal: Participates in PT with improved pain control throughout the shift  Outcome: Progressing  Goal: Free from opioid side effects throughout the shift  Outcome: Progressing  Goal: Free from acute confusion related to pain meds throughout the shift  Outcome: Progressing

## 2025-02-26 NOTE — PROGRESS NOTES
SUBJECTIVE     Overnight patient's blood levels noted to be decreased, given 1 unit of blood, heparin drip stopped, amiodarone drip stopped.  When seen this morning patient reports feeling improved    OBJECTIVE     Physical Exam:  Constitutional:    Thin, chronically-ill appearing male patient resting comfortably, no acute distress  HENT:     dry mucous membranes, temporal muscle wasting   Eyes:      sclera white  Cardiovascular:      irregular heart rate, hypotension   Pulmonary:      3L NC, non-labored  Abdominal:      Tenderness to excoriated area around ostomy but no other tenderness to abdomen. Bowel sounds hypoactive. RLQ ileostomy- pink and moist- greenish liquid and soft stool drainage in ostomy bag.  Leakage around bag and he said nurse is coming to clean it and change bag. Midline scar present- no skin opening. Left upper quadrant PEG- site c,d,I without drainage. Secured with lange  Skin:     No pallor. Contact dermatitis RLQ with areas of sloughed epidermis. No active bleeding at this time and redness has improved.   Extremities:     Extremely thin, foot drop  Neurological:      A&Ox3  Psychiatric:       Appropriate mood and behavior    ASSESSMENT & PLAN     Esvin Gupta is a 64 y.o. year old male with a history of hypertension, pulmonary hypertension hyperlipidemia, paroxysmal A-fib on Eliquis, nonischemic cardiomyopathy, CHF, recent colectomy/ileostomy creation secondary to stage II colon cancer, RA, history of alcohol/tobacco use.  Patient presented to the ED due to a rash and pain throughout colostomy site.  Patient admitted to ICU for septic shock secondary to abdominal infection, DIMIRTI, and several electrolyte normalities.    Daily updates: Overnight patient given 1 unit of blood following hemoglobin dropped, heparin drip stopped, amiodarone stopped.  Sodium mildly decreased. Placed on fluid restriction/stopped fluids. Patient's pressors were weaned to 0.13 this morning.  Will recheck CBC and  ordered anemia lab work.    Neuro/Constitutional  #Pain over ostomy site  -Patient orientated to person place and time  PLAN:  -Analgesia: Tylenol mild, morphine severe    Cardiovascular  #Mixed Shock secondary to hypovolemia and sepsis  #A-fib with RVR  #Nonischemic cardiomyopathy  #History of pulmonary hypertension, hyperlipidemia, hypertension  -New Echo EF: 60-65%  PLAN:  -Continue with Levophed, maintain MAP more than 65, wean as tolerated  -Continue maintenance fluids per nephro section  -Amiodarone and heparin drip discontinued in setting of normal heart rate/hemoglobin   -Holding home blood pressure meds  -Ordered follow-up echocardiogram to reassess heart function    Pulmonary  #No acute issues  PLAN:  -Not currently requiring O2     GI  #Colostomy/ileostomy creation secondary to stage II colon cancer  #Contact Dermatitis from improper ostomy care, low concern for infecion  #Protein calorie malnutrition  #Refeeding syndrome?  PLAN:  -Consulted surgery, no acute inventions at this time, assisting wiwth wound care  -Consulted dietitian/nutritionalist  -Resumed renal diet, added multivitamin  -Continue Protonix IV    Renal  #Likely prerenal DIMITRI  #Electrolyte disturbances  -Baseline CR 0.8  PLAN:  -Nephrology consulted, discontinued fluids and placed fluid restriction   -Switch to half-normal saline fluids, discontinue bicarb    Endocrine  #No acute issues    Heme/Onc  #Thrombocytosis  #Acute on chronic blood loss anemia   PLAN:  -Continue to trend CBC, likely sign of inflammation  -Given 1U blood, reassess CBC  -Ordered Iron studies/anemia work up      ID  #Concern for abdominal infection  -Culture Data: Follow-up blood cultures, wound cultures  PLAN:  -Continue cefepime/Vancomycin    Skin/MSK  #Ostomy irritation  PLAN:  -Continue wound care  -Nystatin added    ICU CHECK LIST  Antimicrobials: Cefepime/vancomycin  Oxygen: Oxygen as needed  Feeding: Renal diet  Drips: Levophed  Fluids: -  Analgesia: -    Sedation: -  VTE ppx: Holding  GI ppx: PPx  Glycemic control: SSI  Bowel care: -  Indwelling catheters: -  Lines: Ostomy  Code Status: Oj Dixon   PGY-II, Internal Medicine

## 2025-02-26 NOTE — CARE PLAN
Problem: Safety - Adult  Goal: Free from fall injury  Outcome: Progressing     Problem: Nutrition  Goal: Nutrient intake appropriate for maintaining nutritional needs  Outcome: Progressing     Problem: Skin  Goal: Decreased wound size/increased tissue granulation at next dressing change  Outcome: Progressing  Goal: Participates in plan/prevention/treatment measures  Outcome: Progressing  Goal: Prevent/manage excess moisture  Outcome: Progressing  Goal: Prevent/minimize sheer/friction injuries  Outcome: Progressing  Goal: Promote/optimize nutrition  Outcome: Progressing  Goal: Promote skin healing  Outcome: Progressing   The patient's goals for the shift include      The clinical goals for the shift include Wean off Levo as tolerated    The patient received 1 unit of PRBC today and we were able to wean off of the Levo drip.  Ostomy bag was changed earlier and a thick layer of barrier cream applied to surrounding skin, When Dr Lopez rouded he also ordered Zinc cream to be applied to the same area.  Patient stated that his abdomen felt a little better than it did this morning

## 2025-02-26 NOTE — PROGRESS NOTES
"Esvin Gutpa is a 64 y.o. male on day 2 of admission presenting with Acute renal failure, unspecified acute renal failure type (CMS-HCC).    Subjective   Patient lying in bed comfortably and still only complaint of some tenderness around ostomy site and area of dermatitis to right abdomen. Patient denies any nausea or vomiting and is continuing to tolerate diet well.  His H/H did drop to 6.5/19.3 this AM but he is asymptomatic.      Objective     Physical Exam:  Constitutional:    Thin, chronically-ill appearing male patient resting comfortably, no acute distress  HENT:     dry mucous membranes, temporal muscle wasting   Eyes:      sclera white  Cardiovascular:      irregular heart rate, hypotension   Pulmonary:      3L NC, non-labored  Abdominal:      Tenderness to excoriated area around ostomy but no other tenderness to abdomen. Bowel sounds hypoactive. RLQ ileostomy- pink and moist- greenish liquid and soft stool drainage in ostomy bag.  Leakage around bag and he said nurse is coming to clean it and change bag. Midline scar present- no skin opening. Left upper quadrant PEG- site c,d,I without drainage. Secured with lange  Skin:     No pallor. Contact dermatitis RLQ with areas of sloughed epidermis. No active bleeding at this time and redness has improved.   Extremities:     Extremely thin, foot drop  Neurological:      A&Ox3  Psychiatric:       Appropriate mood and behavior    Last Recorded Vitals  Blood pressure 102/53, pulse 67, temperature 36.2 °C (97.2 °F), temperature source Temporal, resp. rate 16, height 1.829 m (6' 0.01\"), weight 72.6 kg (160 lb 0.9 oz), SpO2 100%.  Intake/Output last 3 Shifts:  I/O last 3 completed shifts:  In: 4487.2 (61.8 mL/kg) [I.V.:4487.2 (61.8 mL/kg)]  Out: 3875 (53.4 mL/kg) [Urine:3325 (1.3 mL/kg/hr); Stool:550]  Weight: 72.6 kg     Relevant Results  Results for orders placed or performed during the hospital encounter of 02/24/25 (from the past 24 hours)   Heparin Assay, " UFH   Result Value Ref Range    Heparin Unfractionated 1.4 (HH) See Comment Below for Therapeutic Ranges IU/mL   POCT GLUCOSE   Result Value Ref Range    POCT Glucose 174 (H) 74 - 99 mg/dL   Heparin Assay, UFH   Result Value Ref Range    Heparin Unfractionated 0.8 See Comment Below for Therapeutic Ranges IU/mL   Transthoracic Echo (TTE) Complete   Result Value Ref Range    LV EF 63 %   POCT GLUCOSE   Result Value Ref Range    POCT Glucose 136 (H) 74 - 99 mg/dL   Basic metabolic panel   Result Value Ref Range    Glucose 152 (H) 74 - 99 mg/dL    Sodium 126 (L) 136 - 145 mmol/L    Potassium 3.8 3.5 - 5.3 mmol/L    Chloride 93 (L) 98 - 107 mmol/L    Bicarbonate 24 21 - 32 mmol/L    Anion Gap 13 10 - 20 mmol/L    Urea Nitrogen 46 (H) 6 - 23 mg/dL    Creatinine 1.57 (H) 0.50 - 1.30 mg/dL    eGFR 49 (L) >60 mL/min/1.73m*2    Calcium 8.3 (L) 8.6 - 10.3 mg/dL   Heparin Assay, UFH   Result Value Ref Range    Heparin Unfractionated 0.6 See Comment Below for Therapeutic Ranges IU/mL   Heparin Assay   Result Value Ref Range    Heparin Unfractionated 0.6 See Comment Below for Therapeutic Ranges IU/mL   Magnesium   Result Value Ref Range    Magnesium 1.29 (L) 1.60 - 2.40 mg/dL   CBC   Result Value Ref Range    WBC 7.1 4.4 - 11.3 x10*3/uL    nRBC 0.0 0.0 - 0.0 /100 WBCs    RBC 2.41 (L) 4.50 - 5.90 x10*6/uL    Hemoglobin 6.5 (LL) 13.5 - 17.5 g/dL    Hematocrit 19.3 (L) 41.0 - 52.0 %    MCV 80 80 - 100 fL    MCH 27.0 26.0 - 34.0 pg    MCHC 33.7 32.0 - 36.0 g/dL    RDW 15.7 (H) 11.5 - 14.5 %    Platelets 310 150 - 450 x10*3/uL   Basic Metabolic Panel   Result Value Ref Range    Glucose 125 (H) 74 - 99 mg/dL    Sodium 124 (L) 136 - 145 mmol/L    Potassium 3.6 3.5 - 5.3 mmol/L    Chloride 93 (L) 98 - 107 mmol/L    Bicarbonate 23 21 - 32 mmol/L    Anion Gap 12 10 - 20 mmol/L    Urea Nitrogen 32 (H) 6 - 23 mg/dL    Creatinine 1.10 0.50 - 1.30 mg/dL    eGFR 75 >60 mL/min/1.73m*2    Calcium 8.1 (L) 8.6 - 10.3 mg/dL   Phosphorus   Result  Value Ref Range    Phosphorus 2.5 2.5 - 4.9 mg/dL   Heparin Assay   Result Value Ref Range    Heparin Unfractionated 0.4 See Comment Below for Therapeutic Ranges IU/mL   POCT GLUCOSE   Result Value Ref Range    POCT Glucose 109 (H) 74 - 99 mg/dL   Type and screen   Result Value Ref Range    ABO TYPE O     Rh TYPE POS     ANTIBODY SCREEN NEG    Verify ABO/Rh Group Test (VERAB)   Result Value Ref Range    ABO TYPE O     Rh TYPE POS    Prepare RBC: 1 Units   Result Value Ref Range    PRODUCT CODE K8067X65     Unit Number [id number]     Unit ABO O     Unit RH POS     XM INTEP COMP     Dispense Status XM     Blood Expiration Date 3/24/2025 11:59:00 PM EDT     PRODUCT BLOOD TYPE 5100     UNIT VOLUME 292      Transthoracic Echo (TTE) Complete    Result Date: 2/26/2025   [facility], [address] [place] [address]           Tel [phone] and Fax [phone] TRANSTHORACIC ECHOCARDIOGRAM REPORT  Patient Name:       MAXWELL Jerez Physician:    [id number] Dipak Batista MD Study Date:         2/25/2025           Ordering Provider:    [id number] LORENE SAENZ MRN/PID:            [id number]            Fellow: Accession#:         [id number]        Nurse:                Chavo Galloway RN Date of Birth/Age:  1960 / 64     Sonographer:          Lianet arechiga RDCS Gender assigned at  M                   Additional Staff: Birth: Height:             182.00 cm           Admit Date:           2/24/2025 Weight:             72.01 kg            Admission Status:     Inpatient -                                                               Routine BSA / BMI:          1.92 m2 / 21.74     Encounter#:           [id number]                     kg/m2 Blood Pressure:     120/71 mmHg         Department Location:  [address]                                                                floor/ICU Study Type:    TRANSTHORACIC ECHO (TTE) COMPLETE Diagnosis/ICD: Unspecified diastolic (congestive) heart failure (CHF)-I50.30;                Unspecified systolic (congestive) heart failure (CHF)-I50.20 Indication:    Chronic systolic heart failure, ACC/AHA stage C [I50.22                (ICD-10-CM)]; CPT Code:      Echo Complete w Full Doppler-[id number] Patient History: Pertinent History: PMH hypertension, pulmonary hypertension hyperlipidemia,                    paroxysmal A-fib on Eliquis, nonischemic cardiomyopathy, CHF,                    recent colectomy/ileostomy creation secondary to stage II                    colon cancer, RA, history of alcohol/tobacco use. Patient                    presented to the ED due to a rash and pain throughout                    colostomy site. Study Detail: The following Echo studies were performed: 2D and M-Mode. Image               quality for this study is suboptimal. Technically challenging               study due to body habitus and poor acoustic windows. Definity used               as a contrast agent for endocardial border definition. Total               contrast used for this procedure was 2 mL via IV push. Unable to               obtain suprasternal notch view.  PHYSICIAN INTERPRETATION: Left Ventricle: Left ventricular ejection fraction is normal, by visual estimate at 60-65%. There are no regional left ventricular wall motion abnormalities. The left ventricular cavity size is normal. Left ventricular diastolic filling was not assessed. Left Atrium: The left atrial size is normal. Right Ventricle: The right ventricle is mildly enlarged. There is normal right ventricular global systolic function. Right Atrium: The right atrium is normal in size. Aortic Valve: The aortic valve was not well visualized. There is no evidence of aortic valve regurgitation. Mitral Valve: The mitral valve is normal in structure.  There is trace mitral valve regurgitation. Tricuspid Valve: The tricuspid valve is structurally normal. There is trace tricuspid regurgitation. Pulmonic Valve: The pulmonic valve is not well visualized. The pulmonic valve regurgitation was not well visualized. Pericardium: There is no pericardial effusion noted. Aorta: The aortic root is normal. Systemic Veins: The inferior vena cava appears normal in size, with IVC inspiratory collapse greater than 50%. In comparison to the previous echocardiogram(s): Compared with study dated 12/16/2024,.  CONCLUSIONS:  1. Left ventricular ejection fraction is normal, by visual estimate at 60-65%.  2. There is normal right ventricular global systolic function.  3. Mildly enlarged right ventricle. QUANTITATIVE DATA SUMMARY:  LV SYSTOLIC FUNCTION:                      Normal Ranges: EF-Visual:      63 % LV EF Reported: 63 %  TRICUSPID VALVE/RVSP:         Normal Ranges: IVC Diam:             0.77 cm  [id number] Dipak Batista MD Electronically signed on 2/26/2025 at 5:15:52 AM  ** Final **     ECG 12 Lead    Result Date: 2/25/2025  Normal sinus rhythm with sinus arrhythmia Low voltage QRS Septal infarct , age undetermined Abnormal ECG No previous ECGs available    ECG 12 lead    Result Date: 2/25/2025  Atrial fibrillation with rapid ventricular response Indeterminate axis Nonspecific ST abnormality Abnormal ECG When compared with ECG of 24-FEB-2025 18:58, (unconfirmed) Atrial fibrillation has replaced Sinus rhythm Vent. rate has increased BY  68 BPM Questionable change in QRS duration Criteria for Septal infarct are no longer Present    XR chest 1 view    Result Date: 2/25/2025  Interpreted By:  Kevin Willis, STUDY: XR CHEST 1 VIEW;  2/24/2025 11:26 pm   INDICATION: Signs/Symptoms:line placement.   COMPARISON: 2/24/2025   ACCESSION NUMBER(S): [id number]   ORDERING CLINICIAN: TALAT BALBUENA   FINDINGS: A right central venous catheter tip current at the level of the distal innominate  vein. The cardiac silhouette is normal in size. Stable retrocardiac opacity. No significant pleural effusion. No pneumothorax. No pneumothorax.       A right central venous catheter tip terminates at the level of the distal right innominate vein.   Stable retrocardiac opacity may correspond atelectasis or infiltrate.   MACRO: None   Signed by: Kevin Willis 2/25/2025 1:27 AM Dictation workstation:   [id number]    XR chest 1 view    Result Date: 2/24/2025  Interpreted By:  Dylan Corrales, STUDY: XR CHEST 1 VIEW; 2/24/2025 3:52 pm   INDICATION: Signs/Symptoms:hypoxia.   COMPARISON: None   ACCESSION NUMBER(S): [id number]   ORDERING CLINICIAN: RAULITO FLOWER   TECHNIQUE: 1 view of the chest was performed.   FINDINGS: Left retrocardiac small opacity possibly atelectasis or crowded shadows. No pleural effusion. No pneumothorax.  The cardiomediastinal silhouette is within normal limits.       1. Small left retrocardiac opacity possibly atelectasis or crowded shadows. No major infiltrate or pleural effusion.   Signed by: Dylan Corrales 2/24/2025 4:25 PM Dictation workstation:   [id number]         Assessment/Plan   Esvin Gupta is a 64 y.o. male who presented to [facility] ED on 2/24 via EMS from [facility] for evaluation of a wound around his ileostomy. Patient has a history of stage II Colon cancer s/p planned laparoscopic right colectomy on 12/11/2024 with Dr. Montalvo at [facility]. Postoperative course complicated by perforated jejunal diverticulum with feculent peritonitis requiring take-back to OR, exploratory laparotomy, and ultimately diverting ileostomy with staged midline wound closure. His hospital course was complicated by acute respiratory failure with vent dependence s/p Trach/PEG. He required LTAC for rehabilitation for several months. He was ultimately decannulated from trach. Discharged to SNF on 2/17/25. Has been eating regular diet with supplemental nocturnal TF. Patient states that for the  last 3 days the nursing home has not had the proper size ostomy bag. As a result, his ileostomy has been leaking all over his skin. Patient has extensive contact dermatitis from stool contamination. There is raw/bleeding skin in dependent portions of his RLQ abdomen.     Assessment & Plan      Impression:  Contact dermatitis RLQ abdomen from improper ostomy care    > this is an extensive wound and will take some time to heal. Skin needs to remain clean, dry, and completely free from stool. Ensuring proper ostomy pouch fit is essential.       Recommendations:  - Continue Convex wafer as the initial trial of flat wafer yielded leaking of stool from lateral portion of ostomy pouch  - Continue with Duoderm around the appliance to protect surrounding excoriated skin and facilitate seal  - avoid Stoma stewart as this may burn/exacerbate patient's pain  - always use No sting barrier wipes around ostomy prior to bag application   - if possible, have patient lay on left side to minimize and lateral leaking of stool  - Remainder of care per primary team (no abx needed from surgery standpoint)       The patient was seen and discussed with the attending surgeon Dr. John Mcbride, APRN-CNP

## 2025-02-26 NOTE — PROGRESS NOTES
Vancomycin Dosing by Pharmacy- FOLLOW UP    Esvin Gupta is a 64 y.o. year old male who Pharmacy has been consulted for vancomycin dosing for abdominal infection. Based on the patient's indication and renal status this patient is being dosed based on a goal AUC of 400-600.     Renal function is currently improving.      Visit Vitals  BP 96/52   Pulse 102   Temp 36.1 °C (97 °F) (Temporal)   Resp (!) 28        Lab Results   Component Value Date    CREATININE 1.10 2025    CREATININE 1.57 (H) 2025    CREATININE 2.94 (H) 2025    CREATININE 3.35 (H) 2025        Patient weight is as follows:   Vitals:    25 1053   Weight: 72.6 kg (160 lb 0.9 oz)       Cultures:  No results found for the encounter in last 14 days.       I/O last 3 completed shifts:  In: 4487.2 (61.8 mL/kg) [I.V.:4487.2 (61.8 mL/kg)]  Out: 3875 (53.4 mL/kg) [Urine:3325 (1.3 mL/kg/hr); Stool:550]  Weight: 72.6 kg   I/O during current shift:  I/O this shift:  In: 250.6 [I.V.:250.6]  Out: 1450 [Urine:1250; Stool:200]    Temp (24hrs), Av.2 °C (97.2 °F), Min:36 °C (96.8 °F), Max:36.5 °C (97.7 °F)      Assessment/Plan    Will switch to AUC based dosing as renal fxn has improved and now near baseline. 1250mg q24    This dosing regimen is predicted by [employer] to result in the following pharmacokinetic parameters:  [id number]: 454 mg/L.hr  AUC24,ss: 460 mg/L.hr  Probability of AUC24 > 400: 79 %  Ctrough,ss: 13.1 mg/L  Probability of Ctrough,ss > 20: 5 %    The next level will be obtained on  at AM labs. May be obtained sooner if clinically indicated.   Will continue to monitor renal function daily while on vancomycin and order serum creatinine at least every 48 hours if not already ordered.  Follow for continued vancomycin needs, clinical response, and signs/symptoms of toxicity.       Daniel J Weiland, PharmD

## 2025-02-27 LAB
ANION GAP SERPL CALC-SCNC: 13 MMOL/L (ref 10–20)
BUN SERPL-MCNC: 20 MG/DL (ref 6–23)
CALCIUM SERPL-MCNC: 8.7 MG/DL (ref 8.6–10.3)
CHLORIDE SERPL-SCNC: 101 MMOL/L (ref 98–107)
CO2 SERPL-SCNC: 21 MMOL/L (ref 21–32)
CREAT SERPL-MCNC: 0.92 MG/DL (ref 0.5–1.3)
EGFRCR SERPLBLD CKD-EPI 2021: >90 ML/MIN/1.73M*2
ERYTHROCYTE [DISTWIDTH] IN BLOOD BY AUTOMATED COUNT: 15.9 % (ref 11.5–14.5)
ERYTHROCYTE [DISTWIDTH] IN BLOOD BY AUTOMATED COUNT: 15.9 % (ref 11.5–14.5)
GLUCOSE BLD MANUAL STRIP-MCNC: 118 MG/DL (ref 74–99)
GLUCOSE BLD MANUAL STRIP-MCNC: 80 MG/DL (ref 74–99)
GLUCOSE BLD MANUAL STRIP-MCNC: 87 MG/DL (ref 74–99)
GLUCOSE SERPL-MCNC: 79 MG/DL (ref 74–99)
HCT VFR BLD AUTO: 23 % (ref 41–52)
HCT VFR BLD AUTO: 24.4 % (ref 41–52)
HGB BLD-MCNC: 7.7 G/DL (ref 13.5–17.5)
HGB BLD-MCNC: 8 G/DL (ref 13.5–17.5)
MAGNESIUM SERPL-MCNC: 1.79 MG/DL (ref 1.6–2.4)
MCH RBC QN AUTO: 27.5 PG (ref 26–34)
MCH RBC QN AUTO: 27.7 PG (ref 26–34)
MCHC RBC AUTO-ENTMCNC: 32.8 G/DL (ref 32–36)
MCHC RBC AUTO-ENTMCNC: 33.5 G/DL (ref 32–36)
MCV RBC AUTO: 83 FL (ref 80–100)
MCV RBC AUTO: 84 FL (ref 80–100)
NRBC BLD-RTO: 0 /100 WBCS (ref 0–0)
NRBC BLD-RTO: 0 /100 WBCS (ref 0–0)
PLATELET # BLD AUTO: 261 X10*3/UL (ref 150–450)
PLATELET # BLD AUTO: 334 X10*3/UL (ref 150–450)
POTASSIUM SERPL-SCNC: 3.7 MMOL/L (ref 3.5–5.3)
RBC # BLD AUTO: 2.78 X10*6/UL (ref 4.5–5.9)
RBC # BLD AUTO: 2.91 X10*6/UL (ref 4.5–5.9)
SODIUM SERPL-SCNC: 131 MMOL/L (ref 136–145)
WBC # BLD AUTO: 5.2 X10*3/UL (ref 4.4–11.3)
WBC # BLD AUTO: 5.8 X10*3/UL (ref 4.4–11.3)

## 2025-02-27 PROCEDURE — 2500000002 HC RX 250 W HCPCS SELF ADMINISTERED DRUGS (ALT 637 FOR MEDICARE OP, ALT 636 FOR OP/ED)

## 2025-02-27 PROCEDURE — 97161 PT EVAL LOW COMPLEX 20 MIN: CPT | Mod: GP

## 2025-02-27 PROCEDURE — 99231 SBSQ HOSP IP/OBS SF/LOW 25: CPT | Performed by: SURGERY

## 2025-02-27 PROCEDURE — 37799 UNLISTED PX VASCULAR SURGERY: CPT

## 2025-02-27 PROCEDURE — 37799 UNLISTED PX VASCULAR SURGERY: CPT | Performed by: INTERNAL MEDICINE

## 2025-02-27 PROCEDURE — 99232 SBSQ HOSP IP/OBS MODERATE 35: CPT

## 2025-02-27 PROCEDURE — 2500000001 HC RX 250 WO HCPCS SELF ADMINISTERED DRUGS (ALT 637 FOR MEDICARE OP)

## 2025-02-27 PROCEDURE — 82947 ASSAY GLUCOSE BLOOD QUANT: CPT

## 2025-02-27 PROCEDURE — 85027 COMPLETE CBC AUTOMATED: CPT

## 2025-02-27 PROCEDURE — 80048 BASIC METABOLIC PNL TOTAL CA: CPT

## 2025-02-27 PROCEDURE — 99255 IP/OBS CONSLTJ NEW/EST HI 80: CPT | Performed by: INTERNAL MEDICINE

## 2025-02-27 PROCEDURE — 2500000004 HC RX 250 GENERAL PHARMACY W/ HCPCS (ALT 636 FOR OP/ED): Performed by: INTERNAL MEDICINE

## 2025-02-27 PROCEDURE — 2500000004 HC RX 250 GENERAL PHARMACY W/ HCPCS (ALT 636 FOR OP/ED)

## 2025-02-27 PROCEDURE — 85027 COMPLETE CBC AUTOMATED: CPT | Performed by: INTERNAL MEDICINE

## 2025-02-27 PROCEDURE — 1200000002 HC GENERAL ROOM WITH TELEMETRY DAILY

## 2025-02-27 PROCEDURE — 83735 ASSAY OF MAGNESIUM: CPT

## 2025-02-27 PROCEDURE — 99291 CRITICAL CARE FIRST HOUR: CPT | Performed by: INTERNAL MEDICINE

## 2025-02-27 PROCEDURE — 97165 OT EVAL LOW COMPLEX 30 MIN: CPT | Mod: GO

## 2025-02-27 RX ORDER — MORPHINE SULFATE 4 MG/ML
4 INJECTION, SOLUTION INTRAMUSCULAR; INTRAVENOUS EVERY 6 HOURS PRN
Status: DISCONTINUED | OUTPATIENT
Start: 2025-02-27 | End: 2025-03-06 | Stop reason: HOSPADM

## 2025-02-27 RX ADMIN — METOPROLOL TARTRATE 25 MG: 25 TABLET, FILM COATED ORAL at 21:02

## 2025-02-27 RX ADMIN — CEFEPIME 1 G: 1 INJECTION, POWDER, FOR SOLUTION INTRAMUSCULAR; INTRAVENOUS at 12:40

## 2025-02-27 RX ADMIN — TAMSULOSIN HYDROCHLORIDE 0.4 MG: 0.4 CAPSULE ORAL at 10:16

## 2025-02-27 RX ADMIN — MORPHINE SULFATE 4 MG: 4 INJECTION, SOLUTION INTRAMUSCULAR; INTRAVENOUS at 15:09

## 2025-02-27 RX ADMIN — CEFEPIME 1 G: 1 INJECTION, POWDER, FOR SOLUTION INTRAMUSCULAR; INTRAVENOUS at 04:52

## 2025-02-27 RX ADMIN — Medication 1 TABLET: at 09:18

## 2025-02-27 RX ADMIN — NYSTATIN 1 APPLICATION: 100000 POWDER TOPICAL at 21:06

## 2025-02-27 RX ADMIN — APIXABAN 5 MG: 5 TABLET, FILM COATED ORAL at 21:02

## 2025-02-27 RX ADMIN — ZINC OXIDE 1 APPLICATION: 200 OINTMENT TOPICAL at 04:40

## 2025-02-27 RX ADMIN — METOPROLOL TARTRATE 25 MG: 25 TABLET, FILM COATED ORAL at 10:17

## 2025-02-27 RX ADMIN — PANTOPRAZOLE SODIUM 40 MG: 40 INJECTION, POWDER, FOR SOLUTION INTRAVENOUS at 09:18

## 2025-02-27 RX ADMIN — ACETAMINOPHEN 650 MG: 325 TABLET, FILM COATED ORAL at 14:16

## 2025-02-27 RX ADMIN — NYSTATIN 1 APPLICATION: 100000 POWDER TOPICAL at 09:18

## 2025-02-27 ASSESSMENT — COGNITIVE AND FUNCTIONAL STATUS - GENERAL
MOBILITY SCORE: 12
MOVING TO AND FROM BED TO CHAIR: A LOT
CLIMB 3 TO 5 STEPS WITH RAILING: TOTAL
WALKING IN HOSPITAL ROOM: TOTAL
TURNING FROM BACK TO SIDE WHILE IN FLAT BAD: A LITTLE
STANDING UP FROM CHAIR USING ARMS: A LOT
EATING MEALS: A LITTLE
PERSONAL GROOMING: A LITTLE
DRESSING REGULAR UPPER BODY CLOTHING: A LOT
DRESSING REGULAR LOWER BODY CLOTHING: TOTAL
HELP NEEDED FOR BATHING: TOTAL
MOVING FROM LYING ON BACK TO SITTING ON SIDE OF FLAT BED WITH BEDRAILS: A LITTLE
DAILY ACTIVITIY SCORE: 11
TOILETING: TOTAL

## 2025-02-27 ASSESSMENT — PAIN - FUNCTIONAL ASSESSMENT
PAIN_FUNCTIONAL_ASSESSMENT: 0-10

## 2025-02-27 ASSESSMENT — PAIN SCALES - GENERAL
PAINLEVEL_OUTOF10: 0 - NO PAIN
PAINLEVEL_OUTOF10: 0 - NO PAIN
PAINLEVEL_OUTOF10: 5 - MODERATE PAIN
PAINLEVEL_OUTOF10: 6
PAINLEVEL_OUTOF10: 8
PAINLEVEL_OUTOF10: 0 - NO PAIN

## 2025-02-27 ASSESSMENT — PAIN SCALES - PAIN ASSESSMENT IN ADVANCED DEMENTIA (PAINAD): TOTALSCORE: MEDICATION (SEE MAR)

## 2025-02-27 ASSESSMENT — PAIN DESCRIPTION - LOCATION
LOCATION: ABDOMEN
LOCATION: ABDOMEN

## 2025-02-27 ASSESSMENT — PAIN DESCRIPTION - ORIENTATION: ORIENTATION: RIGHT

## 2025-02-27 NOTE — PROGRESS NOTES
"Esvin Gupta is a 64 y.o. male on day 3 of admission presenting with Acute renal failure, unspecified acute renal failure type (CMS-HCC).    Subjective   Patient states he is having a lot of improvement. The pain from the skin breakdown in getting better and he has no complaints with the regimen that has been started. He denies N/V. States he is hearing bowel sounds in his gut. Ostomy bag was emptied last night, no complaints with ostomy fit and no leakage noted.       Objective     Physical Exam:  Constitutional:    Thin, chronically-ill appearing male patient resting comfortably, no acute distress  HENT:     dry mucous membranes, temporal muscle wasting   Eyes:      sclera white  Cardiovascular:      Tachycardia, regular rhythm  Pulmonary:      B/l lungs CTA, no respiratory distress  Abdominal:      Tenderness to excoriated area around ostomy but no other tenderness to abdomen. Area covered with zinc paste. Bowel sounds normal. RLQ ileostomy- pink and moist- greenish liquid and soft stool drainage in ostomy bag. No leakage. Midline scar present- no skin opening. Left upper quadrant PEG- site c,d,I without drainage. Secured with lange  Skin:     No pallor. Contact dermatitis RLQ with areas of sloughed epidermis. No active bleeding at this time and redness has improved.   Extremities:     Extremely thin, foot drop  Neurological:      A&Ox3  Psychiatric:       Appropriate mood and behavior    Last Recorded Vitals  Blood pressure 126/68, pulse 87, temperature 36.5 °C (97.7 °F), temperature source Temporal, resp. rate (!) 28, height 1.829 m (6' 0.01\"), weight 72 kg (158 lb 11.7 oz), SpO2 98%.  Intake/Output last 3 Shifts:  I/O last 3 completed shifts:  In: 4398.2 (60.6 mL/kg) [P.O.:340; I.V.:3408.2 (46.9 mL/kg); Blood:300; IV Piggyback:350]  Out: 5100 (70.2 mL/kg) [Urine:2450 (0.9 mL/kg/hr); Stool:2650]  Weight: 72.6 kg     Relevant Results  Results for orders placed or performed during the hospital encounter " of 02/24/25 (from the past 24 hours)   Prepare RBC: 1 Units   Result Value Ref Range    PRODUCT CODE Q8967E55     Unit Number [id number]     Unit ABO O     Unit RH POS     XM INTEP COMP     Dispense Status TR     Blood Expiration Date 3/24/2025 11:59:00 PM EDT     PRODUCT BLOOD TYPE 5100     UNIT VOLUME 292    POCT GLUCOSE   Result Value Ref Range    POCT Glucose 91 74 - 99 mg/dL   CBC   Result Value Ref Range    WBC 4.8 4.4 - 11.3 x10*3/uL    nRBC 0.0 0.0 - 0.0 /100 WBCs    RBC 2.77 (L) 4.50 - 5.90 x10*6/uL    Hemoglobin 7.8 (L) 13.5 - 17.5 g/dL    Hematocrit 22.8 (L) 41.0 - 52.0 %    MCV 82 80 - 100 fL    MCH 28.2 26.0 - 34.0 pg    MCHC 34.2 32.0 - 36.0 g/dL    RDW 15.9 (H) 11.5 - 14.5 %    Platelets 289 150 - 450 x10*3/uL   POCT GLUCOSE   Result Value Ref Range    POCT Glucose 88 74 - 99 mg/dL   CBC   Result Value Ref Range    WBC 5.2 4.4 - 11.3 x10*3/uL    nRBC 0.0 0.0 - 0.0 /100 WBCs    RBC 2.78 (L) 4.50 - 5.90 x10*6/uL    Hemoglobin 7.7 (L) 13.5 - 17.5 g/dL    Hematocrit 23.0 (L) 41.0 - 52.0 %    MCV 83 80 - 100 fL    MCH 27.7 26.0 - 34.0 pg    MCHC 33.5 32.0 - 36.0 g/dL    RDW 15.9 (H) 11.5 - 14.5 %    Platelets 261 150 - 450 x10*3/uL   Magnesium   Result Value Ref Range    Magnesium 1.79 1.60 - 2.40 mg/dL   Basic Metabolic Panel   Result Value Ref Range    Glucose 79 74 - 99 mg/dL    Sodium 131 (L) 136 - 145 mmol/L    Potassium 3.7 3.5 - 5.3 mmol/L    Chloride 101 98 - 107 mmol/L    Bicarbonate 21 21 - 32 mmol/L    Anion Gap 13 10 - 20 mmol/L    Urea Nitrogen 20 6 - 23 mg/dL    Creatinine 0.92 0.50 - 1.30 mg/dL    eGFR >90 >60 mL/min/1.73m*2    Calcium 8.7 8.6 - 10.3 mg/dL   POCT GLUCOSE   Result Value Ref Range    POCT Glucose 80 74 - 99 mg/dL     Transthoracic Echo (TTE) Complete    Result Date: 2/26/2025   [facility], [address] [place], [place] [address]           Tel [phone] and Fax [phone] TRANSTHORACIC ECHOCARDIOGRAM REPORT  Patient Name:       MAXWELL Jerez  Physician:    [id number] Dipak Batista MD Study Date:         2/25/2025           Ordering Provider:    [id number] LORENE SAENZ MRN/PID:            [id number]            Fellow: Accession#:         [id number]        Nurse:                Chavo Galloway RN Date of Birth/Age:  1960 / 64     Sonographer:          Lianet arechiga                                     RDGIOVANNY Gender assigned at  M                   Additional Staff: Birth: Height:             182.00 cm           Admit Date:           2/24/2025 Weight:             72.01 kg            Admission Status:     Inpatient -                                                               Routine BSA / BMI:          1.92 m2 / 21.74     Encounter#:           [id number]                     kg/m2 Blood Pressure:     120/71 mmHg         Department Location:  [address]                                                               floor/ICU Study Type:    TRANSTHORACIC ECHO (TTE) COMPLETE Diagnosis/ICD: Unspecified diastolic (congestive) heart failure (CHF)-I50.30;                Unspecified systolic (congestive) heart failure (CHF)-I50.20 Indication:    Chronic systolic heart failure, ACC/AHA stage C [I50.22                (ICD-10-CM)]; CPT Code:      Echo Complete w Full Doppler-[id number] Patient History: Pertinent History: PMH hypertension, pulmonary hypertension hyperlipidemia,                    paroxysmal A-fib on Eliquis, nonischemic cardiomyopathy, CHF,                    recent colectomy/ileostomy creation secondary to stage II                    colon cancer, RA, history of alcohol/tobacco use. Patient                    presented to the ED due to a rash and pain throughout                    colostomy site. Study Detail: The following Echo studies were performed: 2D and M-Mode. Image               quality for this study  is suboptimal. Technically challenging               study due to body habitus and poor acoustic windows. Definity used               as a contrast agent for endocardial border definition. Total               contrast used for this procedure was 2 mL via IV push. Unable to               obtain suprasternal notch view.  PHYSICIAN INTERPRETATION: Left Ventricle: Left ventricular ejection fraction is normal, by visual estimate at 60-65%. There are no regional left ventricular wall motion abnormalities. The left ventricular cavity size is normal. Left ventricular diastolic filling was not assessed. Left Atrium: The left atrial size is normal. Right Ventricle: The right ventricle is mildly enlarged. There is normal right ventricular global systolic function. Right Atrium: The right atrium is normal in size. Aortic Valve: The aortic valve was not well visualized. There is no evidence of aortic valve regurgitation. Mitral Valve: The mitral valve is normal in structure. There is trace mitral valve regurgitation. Tricuspid Valve: The tricuspid valve is structurally normal. There is trace tricuspid regurgitation. Pulmonic Valve: The pulmonic valve is not well visualized. The pulmonic valve regurgitation was not well visualized. Pericardium: There is no pericardial effusion noted. Aorta: The aortic root is normal. Systemic Veins: The inferior vena cava appears normal in size, with IVC inspiratory collapse greater than 50%. In comparison to the previous echocardiogram(s): Compared with study dated 12/16/2024,.  CONCLUSIONS:  1. Left ventricular ejection fraction is normal, by visual estimate at 60-65%.  2. There is normal right ventricular global systolic function.  3. Mildly enlarged right ventricle. QUANTITATIVE DATA SUMMARY:  LV SYSTOLIC FUNCTION:                      Normal Ranges: EF-Visual:      63 % LV EF Reported: 63 %  TRICUSPID VALVE/RVSP:         Normal Ranges: IVC Diam:             0.77 cm  [id number] [facility]  MD Electronically signed on 2/26/2025 at 5:15:52 AM  ** Final **          Assessment/Plan   Esvin Gupta is a 64 y.o. male who presented to [facility] ED on 2/24 via EMS from [facility] for evaluation of a wound around his ileostomy. Patient has a history of stage II Colon cancer s/p planned laparoscopic right colectomy on 12/11/2024 with Dr. Montalvo at [facility]. Postoperative course complicated by perforated jejunal diverticulum with feculent peritonitis requiring take-back to OR, exploratory laparotomy, and ultimately diverting ileostomy with staged midline wound closure. His hospital course was complicated by acute respiratory failure with vent dependence s/p Trach/PEG. He required LTAC for rehabilitation for several months. He was ultimately decannulated from trach. Discharged to SNF on 2/17/25. Has been eating regular diet with supplemental nocturnal TF. Patient states that for the last 3 days the nursing home has not had the proper size ostomy bag. As a result, his ileostomy has been leaking all over his skin. Patient has extensive contact dermatitis from stool contamination. There is raw/bleeding skin in dependent portions of his RLQ abdomen.     Assessment & Plan      Impression:  Contact dermatitis RLQ abdomen from improper ostomy care    > this is an extensive wound and will take some time to heal. Skin needs to remain clean, dry, and completely free from stool. Ensuring proper ostomy pouch fit is essential.       Recommendations:  - Continue Convex wafer as the initial trial of flat wafer yielded leaking of stool from lateral portion of ostomy pouch  - Continue with Duoderm around the appliance to protect surrounding excoriated skin and facilitate seal  - avoid Stoma paste as this may burn/exacerbate patient's pain  - always use No sting barrier wipes around ostomy prior to bag application   - Zinc paste applied over area  - if possible, have patient lay on left side to minimize and  lateral leaking of stool  - Remainder of care per primary team (no abx needed from surgery standpoint)    The patient will be seen and discussed with the attending surgeon Dr. John Michelle PA-C

## 2025-02-27 NOTE — PROGRESS NOTES
General Surgery Attending Note     My Acute Care Surgery SAMRA (Advanced Practice Provider) evaluated this patient today.  Please see that documentation for details.     I saw the patient with the SAMRA today, and personally evaluated and examined the patient.  My findings are consistent with those of the SAMRA.          Impression:       Hospital day #4     Remains in the heart center  On vancomycin and cefepime     Without complaints  Tolerating diet  Colostomy has remained intact without leakage       Afebrile stable vital signs  Abdomen reveals G-tube intact to the left upper quadrant; well-healed midline incision; the ileostomy stoma appliance is intact and the bag is filled with succus entericus; barrier cream and Desitin on excoriated and dermatitis areas;  abdomen remains benign     Normal white blood cell count; H&H stable x 24 hours at 8.0 and 24.4     General surgical impression is fecal contact dermatitis -improving     Plan:       Will continue current therapy and monitor with regard to the abdomen and stoma  Per critical care service otherwise  We will reevaluate in 2 days, on March 1

## 2025-02-27 NOTE — PROGRESS NOTES
SUBJECTIVE     Overnight patient's blood levels noted to be decreased, given 1 unit of blood, heparin drip stopped, amiodarone drip stopped.  When seen this morning patient reports feeling improved. Abdominal wounds look significantly improved. Ostomy site draining appropriate colored and amount of stool. No blood.      OBJECTIVE     Physical Exam:  Constitutional:    Thin, chronically-ill appearing male patient resting comfortably, no acute distress  HENT:     dry mucous membranes, temporal muscle wasting   Eyes:      sclera white  Cardiovascular:      irregular heart rate, hypotension   Pulmonary:      3L NC, non-labored  Abdominal:      Tenderness to excoriated area around ostomy but no other tenderness to abdomen. Bowel sounds hypoactive. RLQ ileostomy- pink and moist- soft stool drainage in ostomy bag.  Midline scar present- no skin opening. Left upper quadrant PEG- site c,d,I without drainage. Secured with lange  Skin:     No pallor. Contact dermatitis RLQ with areas of sloughed epidermis. No active bleeding at this time and redness has improved.   Extremities:     Extremely thin, foot drop  Neurological:      A&Ox3  Psychiatric:       Appropriate mood and behavior      ASSESSMENT & PLAN     Esvin Gupta is a 64 y.o. year old male with a history of hypertension, pulmonary hypertension hyperlipidemia, paroxysmal A-fib on Eliquis, nonischemic cardiomyopathy, CHF, recent colectomy/ileostomy creation secondary to stage II colon cancer, RA, history of alcohol/tobacco use.  Patient presented to the ED due to a rash and pain throughout colostomy site.  Patient admitted to ICU for septic shock secondary to abdominal infection, DIMITRI, and several electrolyte normalities.    Daily updates:   2/26 - Overnight patient given 1 unit of blood following hemoglobin dropped, heparin drip stopped, amiodarone stopped.  Sodium mildly decreased. Placed on fluid restriction/stopped fluids. Patient's pressors were weaned to  0.13 this morning.  Will recheck CBC and ordered anemia lab work.  2/27 -patient's blood pressure has stabilized now and is currently off all pressors.  Central line was removed.  Arterial line was removed.  Abdominal incision looks significantly improved for us as well.  Stool draining appropriately.  Labs and normalizing kidney function is currently back at his baseline.  CBC showed a stable hemoglobin.  Plan to transfer down to the floor today.    PLAN:     Neuro/Constitutional  #Pain over ostomy site  -Patient orientated to person place and time  PLAN:  -Analgesia: Tylenol mild, morphine severe    Cardiovascular  #Mixed Shock secondary to hypovolemia and sepsis  #A-fib with RVR  #Nonischemic cardiomyopathy  #History of pulmonary hypertension, hyperlipidemia, hypertension  -New Echo EF: 60-65%  PLAN:  - MAP goal >65  - Continue maintenance fluids per nephro section  - Amiodarone and heparin drip discontinued in setting of normal heart rate/hemoglobin   - Holding home blood pressure meds    Pulmonary  #No acute issues  PLAN:  -Not currently requiring O2     GI  #Colostomy/ileostomy creation secondary to stage II colon cancer  #Contact Dermatitis from improper ostomy care, low concern for infecion  #Protein calorie malnutrition  #Refeeding syndrome?  PLAN:  -Consulted surgery, no acute inventions at this time, assisting with wound care  -Consulted dietitian/nutritionist  -Resumed renal diet, added multivitamin  -Continue Protonix IV    Renal  #Likely prerenal DIMITRI  #Electrolyte disturbances  -Baseline CR 0.8  PLAN:  -Nephrology consulted  - 2/27 - Na 131, rest of electrolytes now have normalized, result normal PO diet    Endocrine  #No acute issues    Heme/Onc  #Thrombocytosis  #Acute on chronic blood loss anemia   PLAN:  -Continue to trend CBC, likely sign of inflammation  -Given 1U blood 2/26, reassess CBC    ID  #Cellulitis vs contact dermatitis of the abdomen  -Culture Data: Follow-up blood cultures, wound  cultures  PLAN:  -Continue cefepime 2/24-3/28  -DC Vancomycin 2/27    Skin/MSK  #Ostomy irritation  PLAN:  -Continue wound care  -Nystatin added    ICU CHECK LIST  Antimicrobials: Cefepime  Oxygen: Oxygen as needed  Feeding: Renal diet  Drips: None  Fluids: -  Analgesia: -   Sedation: -  VTE ppx: Holding  GI ppx: PPx  Glycemic control: SSI  Bowel care: -  Indwelling catheters: CVC out today.  Lines: Ostomy  Code Status: Full    Hector Vallejo  PGY-3, Emergency Medicine    CRITICAL CARE MEDICINE ATTENDING NOTE    I saw and evaluated the patient. I personally obtained the key and critical portions of the history and physical examination. I reviewed the resident's documentation and discussed the patient with the resident. I agree with the resident's medical decision making as documented in the resident's note.    The patient has high probability of sudden, clinically significant deterioration, which requires urgent interventions. I managed/supervised life supporting interventions that required frequent physician assessment. I spent 40 critical care minutes of my full attention on this patient's management and direct patient care. Time I spent with family or surrogate(s) is included if the patient was incapable of providing information or participating in medical decision making. Time devoted to teaching and to any procedures I billed separately is not included. Medical issues requiring critical care management include:    Lillian Felder MD  Pulmonary and Critical Care Medicine

## 2025-02-27 NOTE — PROGRESS NOTES
NEPHROLOGY PROGRESS NOTE    REASON FOR CONSULT: DIMITRI and hyperkalemia    SUBJECTIVE:  Patient is resting comfortably.  He denies any pain.  He has been having the ostomy output.  Appetite is good.  No trouble breathing.    OBJECTIVE:    Visit Vitals  /55   Pulse 69   Temp 36.5 °C (97.7 °F) (Temporal)   Resp 17          Intake/Output Summary (Last 24 hours) at 2/27/2025 0803  Last data filed at 2/27/2025 0531  Gross per 24 hour   Intake 1234.49 ml   Output 3950 ml   Net -2715.51 ml        General: Awake and Alert, In no distress, Cooperative  HEENT: Oral mucosa moist, EOMI  NECK: Supple  CHEST: No crackles, no wheeze, no tachypnea  CVS: S1,S2 heard, no rubs, irregularly irregular  ABD: Soft, Non Tender, BS present, ostomy  EXT: no edema    MEDICATION:    Scheduled medications  [Held by provider] apixaban, 5 mg, oral, BID  cefepime, 1 g, intravenous, q12h  insulin lispro, 0-5 Units, subcutaneous, TID AC  [Held by provider] metoprolol tartrate, 25 mg, oral, BID  multivitamin with minerals, 1 tablet, oral, Daily  nystatin, 1 Application, Topical, BID  pantoprazole, 40 mg, intravenous, Daily  [Held by provider] sacubitriL-valsartan, 1 tablet, oral, BID  [Held by provider] tamsulosin, 0.4 mg, oral, Daily  vancomycin, 1,250 mg, intravenous, q24h      Continuous medications  [Held by provider] heparin, 0-4,000 Units/hr, Last Rate: Stopped (02/26/25 0652)      PRN medications  PRN medications: acetaminophen **OR** acetaminophen **OR** acetaminophen, alteplase, ipratropium-albuteroL, morphine, ondansetron **OR** ondansetron, vancomycin, zinc oxide     RESULTS:    Lab Results   Component Value Date    WBC 5.2 02/27/2025    HGB 7.7 (L) 02/27/2025    HCT 23.0 (L) 02/27/2025    MCV 83 02/27/2025     02/27/2025        Lab Results   Component Value Date    CREATININE 0.92 02/27/2025    BUN 20 02/27/2025     (L) 02/27/2025    K 3.7 02/27/2025     02/27/2025    CO2 21 02/27/2025        Radiology Imaging  reviewed      ASSESSMENT/PLAN:     1.  DIMITRI: Patient's renal function has normalized.  Peak creatinine was 5.62.  Current creatinine at 0.92.  He is off IV fluids.  Off pressors.  Keep MAP greater than 65 mmHg.    2.    Hyponatremia: Initial serum sodium was 118 but there was some rapid correction thereby he was given D5W.  Discontinued IV fluids.  Place him on fluid restriction of 1500 mL a day.  Serum sodium at 131.  Appropriate correction.    3.  Anemia: S/p transfusion.  Hemoglobin currently at 7.7.    Thank You very much for allowing me to participate in the care of this Patient    This document was created using dragon dictation and may contain unintended error    Bunny London MD   02/27/25

## 2025-02-27 NOTE — CONSULTS
"                                                                   Hospital Medicine Consult Note       Patient Name: Esvin Gputa   YOB: 1960    Requesting Provider: Dr. Chapman  Reason for Consult: ICU transfer       Subjective:    Esvin Gupta is a 64 y.o. male who presents to the hospital with complaints of abdominal rash from ostomy. Patient was at a SNF where they ran out of ostomy supplies. He apparently went without for several days with fecal material leaking freely on his skin. This resulted in extensive dermatitis. In the ED, he was found to be in shock with severe DIMITRI & hyperkalemia. He was admitted to the ICU for initial treatment. Was treated with vasopressors and IV fluids. Course c/b afib with RVR which was treated with Amiodarone and heparin infusion. Got abx and gen surg eval for ostomy issues. DIMITRI has since resolved. Patient deemed fit to transfer out of ICU and we were consulted to take over management.     A 10 point ROS was completed and is negative expect as stated in HPI.     Past Medical History:  Stage II colon cancer s/p R colectomy   pAfib  HFrEF/NICM  HTN  HLD  RA    Past Surgical History:  Laparoscopic right colectomy on 12/11/2024 with Dr. Montalvo at [facility]. Postoperative course complicated by perforated jejunal diverticulum with feculent peritonitis requiring take-back to OR, exploratory laparotomy, and ultimately diverting ileostomy with staged midline wound closure   Trach/PEG  Appendectomy  Tonsillectomy     Social History: Tobacco - Former User, Alcohol - none, Recreational Drugs - none    Family History: family history includes Arthritis in his mother; Diabetes in his sibling; Pancreatic cancer in his mother; Stroke in his father; cardiac pacemaker in his father.     Objective:    /66   Pulse 74   Temp 35.9 °C (96.6 °F) (Temporal)   Resp 17   Ht 1.829 m (6' 0.01\")   Wt 72 kg (158 lb 11.7 oz)   SpO2 98%   BMI 21.52 kg/m² "     Physical Exam:    GENERAL: NAD, alert & cooperative, chronically ill appearing  HEENT: normocephalic, atraumatic, sclera clear  CARDIAC: regular rate & rhythm, S1/S2  PULMONARY: CTA b/l, no respiratory distress, - wheezes  ABDOMEN: soft, non-distended erythema and excoriations on abd  MSK: no peripheral edema, no obvious deformity  NEURO: A&O X 3, non-focal, CN II-XII grossly intact  PSYCH: appropriate mood & affect     Assessment/Plan:    Septic/Hypovolemic Shock   Cellulitis/Contact dermatitis   Acute on Chronic Anemia   DIMITRI  Hyperkalemia   Moderate to Severe Protein Calorie Malnutrition   pAfib  HFrEF/NICM  HTN  HLD  RA  Stage II colon cancer s/p R colectomy       Currently on Cefepime. Day 4 abx. Urine and blood cultures negative to date. Only source of sepsis is the cellulitis on abd. Complete 5-7 days abx. Appreciate gen surg input re: ostomy care  GDMT meds on hold. Was severely hypovolemic on presentation. Resume as tolerated.   Tolerating PO intake, gets tube feeds overnight at the [facility]. RD recommends to resume if eating < 75% of meals.   Rates controled. Back on Eliquis. On Metoprolol 25mg BID, takes Toprol  daily at baseline.   Renal function and electrolyte derangements are resolved.   PT/OT      DVT Prophylaxis: Eliquis  Code Status: FULL CODE  Disposition: [facility]     Andrew Irving, DO  Hospital Medicine

## 2025-02-27 NOTE — CARE PLAN
The patient's goals for the shift include      The clinical goals for the shift include pt will remain hemodynamially stable      Problem: Pain - Adult  Goal: Verbalizes/displays adequate comfort level or baseline comfort level  2/27/2025 0814 by Karena Wren RN  Outcome: Progressing  2/27/2025 0813 by Karena Wren RN  Outcome: Progressing     Problem: Safety - Adult  Goal: Free from fall injury  2/27/2025 0814 by Karena Wren RN  Outcome: Progressing  2/27/2025 0813 by Karena Wren RN  Outcome: Progressing     Problem: Discharge Planning  Goal: Discharge to home or other facility with appropriate resources  2/27/2025 0814 by Karena Wren RN  Outcome: Progressing  2/27/2025 0813 by Karena Wren RN  Outcome: Progressing     Problem: Chronic Conditions and Co-morbidities  Goal: Patient's chronic conditions and co-morbidity symptoms are monitored and maintained or improved  2/27/2025 0814 by Karena Wren RN  Outcome: Progressing  2/27/2025 0813 by Karena Wren RN  Outcome: Progressing     Problem: Nutrition  Goal: Nutrient intake appropriate for maintaining nutritional needs  2/27/2025 0814 by Karena Wren RN  Outcome: Progressing  2/27/2025 0813 by Karena Wren RN  Outcome: Progressing     Problem: Skin  Goal: Decreased wound size/increased tissue granulation at next dressing change  2/27/2025 0814 by Karena Wren RN  Outcome: Progressing  Flowsheets (Taken 2/27/2025 0814)  Decreased wound size/increased tissue granulation at next dressing change: Promote sleep for wound healing  2/27/2025 0813 by Karena Wren RN  Outcome: Progressing  Goal: Participates in plan/prevention/treatment measures  2/27/2025 0814 by Karena Wren RN  Outcome: Progressing  Flowsheets (Taken 2/27/2025 0814)  Participates in plan/prevention/treatment measures: Discuss with provider PT/OT consult  2/27/2025 0813 by Karena Wren RN  Outcome: Progressing  Goal: Prevent/manage excess moisture  2/27/2025 0814 by Karena  GAVIN Wren  Outcome: Progressing  Flowsheets (Taken 2/27/2025 0814)  Prevent/manage excess moisture: Cleanse incontinence/protect with barrier cream  2/27/2025 0813 by Karena Wren RN  Outcome: Progressing  Goal: Prevent/minimize sheer/friction injuries  2/27/2025 0814 by Karena Wren RN  Outcome: Progressing  Flowsheets (Taken 2/27/2025 0814)  Prevent/minimize sheer/friction injuries: Complete micro-shifts as needed if patient unable. Adjust patient position to relieve pressure points, not a full turn  2/27/2025 0813 by Karena Wren RN  Outcome: Progressing  Goal: Promote/optimize nutrition  2/27/2025 0814 by Karena Wren RN  Outcome: Progressing  Flowsheets (Taken 2/27/2025 0814)  Promote/optimize nutrition: Monitor/record intake including meals  2/27/2025 0813 by Karena Wren RN  Outcome: Progressing  Goal: Promote skin healing  2/27/2025 0814 by Karena Wren RN  Outcome: Progressing  Flowsheets (Taken 2/27/2025 0814)  Promote skin healing: Assess skin/pad under line(s)/device(s)  2/27/2025 0813 by Karena Wren RN  Outcome: Progressing     Problem: Fall/Injury  Goal: Not fall by end of shift  2/27/2025 0814 by Karena Wren RN  Outcome: Progressing  2/27/2025 0813 by Karena Wren RN  Outcome: Progressing  Goal: Be free from injury by end of the shift  2/27/2025 0814 by Karena Wren RN  Outcome: Progressing  2/27/2025 0813 by Karena Wren RN  Outcome: Progressing  Goal: Verbalize understanding of personal risk factors for fall in the hospital  2/27/2025 0814 by Karena Wren RN  Outcome: Progressing  2/27/2025 0813 by Karena Wren RN  Outcome: Progressing  Goal: Verbalize understanding of risk factor reduction measures to prevent injury from fall in the home  2/27/2025 0814 by Karena Wren RN  Outcome: Progressing  2/27/2025 0813 by Karena Wren RN  Outcome: Progressing  Goal: Use assistive devices by end of the shift  2/27/2025 0814 by Karena Wren RN  Outcome: Progressing  2/27/2025  0813 by Karena Wren RN  Outcome: Progressing  Goal: Pace activities to prevent fatigue by end of the shift  2/27/2025 0814 by Karena Wren RN  Outcome: Progressing  2/27/2025 0813 by Karena Wren RN  Outcome: Progressing     Problem: Pain  Goal: Takes deep breaths with improved pain control throughout the shift  2/27/2025 0814 by Karena Wren RN  Outcome: Progressing  2/27/2025 0813 by Karena Wren RN  Outcome: Progressing  Goal: Turns in bed with improved pain control throughout the shift  2/27/2025 0814 by Karena Wren RN  Outcome: Progressing  2/27/2025 0813 by Karena Wren RN  Outcome: Progressing  Goal: Walks with improved pain control throughout the shift  2/27/2025 0814 by Karena Wren RN  Outcome: Progressing  2/27/2025 0813 by Karena Wren RN  Outcome: Progressing  Goal: Performs ADL's with improved pain control throughout shift  2/27/2025 0814 by Karena Wren RN  Outcome: Progressing  2/27/2025 0813 by Karena Wren RN  Outcome: Progressing  Goal: Participates in PT with improved pain control throughout the shift  2/27/2025 0814 by Karena Wren RN  Outcome: Progressing  2/27/2025 0813 by Karena Wren RN  Outcome: Progressing  Goal: Free from opioid side effects throughout the shift  2/27/2025 0814 by Karena Wren RN  Outcome: Progressing  2/27/2025 0813 by Karena Wren RN  Outcome: Progressing  Goal: Free from acute confusion related to pain meds throughout the shift  2/27/2025 0814 by Karena Wren RN  Outcome: Progressing  2/27/2025 0813 by Karena Wren RN  Outcome: Progressing

## 2025-02-27 NOTE — CARE PLAN
The patient's goals for the shift include      The clinical goals for the shift include Wean off Levo as tolerated    Over the shift, the patient did not make progress toward the following goals. Barriers to progression include . Recommendations to address these barriers include .

## 2025-02-27 NOTE — PROGRESS NOTES
Physical Therapy    Physical Therapy Evaluation    Patient Name: Esvin Gupta  MRN: [id number]  [id number]  Today's Date: 2/27/2025   Time Calculation  Start Time: 1438  Stop Time: 1456  Time Calculation (min): 18 min    Assessment/Plan   PT Assessment  PT Assessment Results: Decreased strength, Decreased endurance, Decreased mobility, Impaired balance  Rehab Prognosis: Good  Barriers to Discharge Home: Caregiver assistance  Caregiver Assistance: Patient lives alone and/or does not have reliable caregiver assistance  End of Session Communication: Bedside nurse  Assessment Comment: Pt is presenting with a decline in functional mobility from baseline. Pt would benefit from further PT services to address these deficits to return to prior level of function.  End of Session Patient Position: Bed, 3 rail up, Alarm off, not on at start of session  IP OR SWING BED PT PLAN  Inpatient or Swing Bed: Inpatient  PT Plan  Treatment/Interventions: Bed mobility, Transfer training, Gait training, Balance training, Strengthening, Endurance training, Therapeutic exercise, Therapeutic activity  PT Plan: Ongoing PT  PT Frequency: 3 times per week  PT Discharge Recommendations: Moderate intensity level of continued care  PT - OK to Discharge: Yes    Subjective     Current Problem:  Patient Active Problem List   Diagnosis    Chronic systolic heart failure, ACC/AHA stage C    Constipation    Foot drop, left foot    Hypertension    Nonischemic cardiomyopathy (Multi)    Paroxysmal atrial fibrillation with rapid ventricular response (Multi)    Pulmonary hypertension (Multi)    Rheumatoid arthritis    Tricuspid regurgitation    Alcohol dependence    Alcohol withdrawal syndrome (Multi)    Atrial fibrillation (Multi)    Gout, tophaceous    Thrombocytopenia (CMS-HCC)    BMI 23.0-23.9, adult    Never smoked tobacco    Long term current use of anticoagulant therapy    Acute renal failure, unspecified acute renal failure type (CMS-[facility])        General Visit Information:  General  Reason for Referral: PT eval and treat; cellulitis of abd wall, hyperkalemia, lung ca, septic shock, leaking ileostomy and SNF. pt. had colectomy 12/2024 with complications/resp failure, trach/peg. surgery: fecal contact dermatitis, pt. transfused this admission  Referred By: Moris Chapman  Past Medical History Relevant to Rehab: ypertension, pulmonary hypertension hyperlipidemia, paroxysmal A-fib on Eliquis, nonischemic cardiomyopathy, CHF, recent colectomy/ileostomy creation secondary to stage II colon cancer, RA, history of alcohol/tobacco use.  Patient presented to the ED due to a rash and pain throughout colostomy site  Family/Caregiver Present: No  Co-Treatment: OT  Co-Treatment Reason: For patient safety and to maximize mobility  Prior to Session Communication: Bedside nurse  Patient Position Received: Bed, 3 rail up, Alarm off, not on at start of session  General Comment: Pt resting in bed upon entering, agreeable to PT    Home Living:  Home Living  Home Living Comments: Pt admitted to hospital in Dec 2024 and has been at a SNF for one week prior to admission. Prior to this pt lives alone in a 1 floor apt.    Prior Level of Function:  Prior Function Per Pt/Caregiver Report  Prior Function Comments: [facility] and has assist for ADLs.    Precautions:  Precautions  Precautions Comment: ileostomy/leaking during session and bandaged falling off, RN informed and in room to address issue end of session, telemetry, PIV    Vital Signs:  Vital Signs  Vital Signs Comment: VSS throughout session  Objective     Pain:  Pain Assessment  Pain Assessment:  (6.5/10 stomach pain, buttocks and back pain)    Cognition:  Cognition  Overall Cognitive Status: Within Functional Limits    General Assessments:      Activity Tolerance  Endurance: Decreased tolerance for upright activites  Early Mobility/Exercise Safety Screen: Proceed with  mobilization - No exclusion criteria met  Sensation  Light Touch: No apparent deficits  Strength  Strength Comments: BLE knee ext 4-/5, hip flexion 3-/5; bilateral toes contracted in flexion. Pt reports it is extremely difficult to ambulate without his shoes. Recommneded pt have family bring shoes in.  Perception  Inattention/Neglect: Appears intact  Coordination  Movements are Fluid and Coordinated: Yes  Postural Control  Postural Control: Within Functional Limits  Static Sitting Balance  Static Sitting-Level of Assistance: Close supervision  Dynamic Sitting Balance  Dynamic Sitting-Level of Assistance: Contact guard  Static Standing Balance  Static Standing-Level of Assistance: Minimum assistance  Dynamic Standing Balance  Dynamic Standing-Level of Assistance: Moderate assistance    Functional Assessments:     Bed Mobility  Bed Mobility: Yes  Bed Mobility 1  Bed Mobility Comments 1: supine>sit with SBA, sit >supine with min assist for BLE  Transfers  Transfer: Yes  Transfer 1  Trials/Comments 1: sit to stand min assist x 2 with WW, pt leaning posteriorly on bed for support  Ambulation/Gait Training  Ambulation/Gait Training Performed:  (Pt completes lateral sidesteps toward HOB with WW with min assist x 2, cues to avoid leaning posteriorly on bed, for walker management and upright posture)    Outcome Measures:  [facility] Basic Mobility  Turning from your back to your side while in a flat bed without using bedrails: A little  Moving from lying on your back to sitting on the side of a flat bed without using bedrails: A little  Moving to and from bed to chair (including a wheelchair): A lot  Standing up from a chair using your arms (e.g. wheelchair or bedside chair): A lot  To walk in hospital room: Total  Climbing 3-5 steps with railing: Total  Basic Mobility - Total Score: 12           FSS-ICU  Ambulation: Walks <50 feet with any assistance x1 or walks any distance with assistance x2 people  Rolling: Minimal assistance  (performs 75% or more of task)  Sitting: Minimal assistance (performs 75% or more of task)  Transfer Sit-to-Stand: Moderate assistance (performs 50 - 74% of task)  Transfer Supine-to-Sit: Minimal assistance (performs 75% or more of task)  Total Score: 16  ICU Mobility Screen  Early Mobility/Exercise Safety Screen: Proceed with mobilization - No exclusion criteria met  ICU Mobility Scale: Standing  E = Exercise and Early Mobility  Early Mobility/Exercise Safety Screen: Proceed with mobilization - No exclusion criteria met  ICU Mobility Scale: Standing          Goals:  Encounter Problems       Encounter Problems (Active)       PT Problem       PT Goal 1 STG - Pt will amb 25' using WW with SBA  (Progressing)       Start:  02/27/25    Expected End:  03/06/25            PT Goal 2 STG - Pt will transition supine <> sitting with SUP (Progressing)       Start:  02/27/25    Expected End:  03/06/25            PT Goal 3 STG - Pt will SPT bed <> chair with SBA (Progressing)       Start:  02/27/25    Expected End:  03/06/25            PT Goal 4 STG - Pt will transfer STS with SBA (Progressing)       Start:  02/27/25    Expected End:  03/06/25               Pain - Adult            Education Documentation  Mobility Training, taught by Lenore Lang, PT at 2/27/2025  3:44 PM.  Learner: Patient  Readiness: Acceptance  Method: Explanation  Response: Verbalizes Understanding    Education Comments  No comments found.

## 2025-02-27 NOTE — PROGRESS NOTES
Occupational Therapy    Evaluation    Patient Name: Esvin Gupta  MRN: [id number]  Department: [facility]  Room: [address] [place], [place] [address]  Today's Date: 2/27/2025  Time Calculation  Start Time: 1439  Stop Time: 1456  Time Calculation (min): 17 min    Assessment  IP OT Assessment  OT Assessment: Pt. presents with a decline in self-care, mobility and safety and would benefit from skilled OT services to maximize independence and promote return to prior level of function.  Prognosis: Good  Barriers to Discharge Home: Physical needs  End of Session Communication: Bedside nurse  End of Session Patient Position: Bed, 3 rail up, Alarm off, caregiver present  Plan:  Treatment Interventions: ADL retraining, Functional transfer training, UE strengthening/ROM, Compensatory technique education  OT - OK to Discharge: Yes (to next level of care when cleared by medical team)    Subjective   Current Problem:  1. Acute renal failure, unspecified acute renal failure type (CMS-HCC)        2. Cellulitis of abdominal wall        3. Hyperkalemia        4. Hyponatremia        5. Hypovolemic shock (Multi)  Central Line    Central Line      6. Chronic systolic heart failure, ACC/AHA stage C  Transthoracic Echo (TTE) Complete    Transthoracic Echo (TTE) Complete      7. Unspecified diastolic (congestive) heart failure  Transthoracic Echo (TTE) Complete      8. Unspecified systolic (congestive) heart failure  Transthoracic Echo (TTE) Complete        General:  General  Reason for Referral: impaired adl; pt. admitted with cellulitis of abd wall, hyperkalemia, lung ca, septic shock, leaking ileostomy and SNF.  pt. had colectomy 12/2024 with complications/resp failure, trach/peg.  surgery:  fecal contact dermatitis, pt. transfused this admission  Co-Treatment: PT  Co-Treatment Reason: to maximize patient safety/abilities  Prior to Session Communication: Bedside nurse  Patient Position Received: Bed, 3 rail up, Alarm off, not on at start of session  General  Comment: pt. agreeable to therapy intervention  Precautions:  Precautions Comment: ileostomy/leaking during session, RN informed, tele, piv, VSS     Date/Time Vitals Session Patient Position Pulse Resp SpO2 BP MAP (mmHg)    02/27/25 1400 --  --  62  17  --  --  --     02/27/25 1500 --  --  73  19  --  144/70  100                Pain:  Pain Assessment  Pain Assessment:  (R sided trunk/buttock pain 6.5, pt. had been pre medicated with reports of relief from previous number, repositioned)    Objective   Cognition:  Overall Cognitive Status: Within Functional Limits           Home Living:  Home Living Comments: pt. has not been home since December 2024 when original surgery occurred, prior to December, pt. home alone in a 1 floor apt, independent with adl/iadl/driving.  since December pt. most recently at SNF, able to ambulate via wh.walker with wheelchair follow and assist x 1, assist x 1 for adl   Prior Function:     IADL History:     ADL:  ADL Comments: dependent in supine to don/dof socks, would estimate dependent for standing aspects of adl as pt. requires bue support to maintain standing  Activity Tolerance:  Endurance: Decreased tolerance for upright activites  Early Mobility/Exercise Safety Screen: Proceed with mobilization - No exclusion criteria met  Bed Mobility/Transfers: Bed Mobility  Bed Mobility:  (sba supine to sit, min assist sit to supine)    Transfers  Transfer:  (sit<> stand from eob:  min assist x 2 and cues to flex bilateral knees as able)      Functional Mobility:  Functional Mobility  Functional Mobility Performed:  (pt. able to sidestep via wh.walker, stiffness BLE's, min assist x 2)  Sensation:  Sensation Comment: sensation intact  Strength:  Strength Comments: bue's at least 3/5 per observation  Coordination:  Coordination Comment: deformities of digits bilaterally   Outcome Measures: [facility] Daily Activity  Putting on and taking off regular lower body clothing: Total  Bathing (including  washing, rinsing, drying): Total  Putting on and taking off regular upper body clothing: A lot  Toileting, which includes using toilet, bedpan or urinal: Total  Taking care of personal grooming such as brushing teeth: A little  Eating Meals: A little  Daily Activity - Total Score: 11         and Early Mobility/Exercise Safety Screen: Proceed with mobilization - No exclusion criteria met  ICU Mobility Scale: Standing [4]  Education Documentation  Precautions, taught by Karin Hobbs OT at 2/27/2025  3:37 PM.  Learner: Patient  Readiness: Acceptance  Method: Explanation  Response: Verbalizes Understanding, Needs Reinforcement  Comment: walker safety    ADL Training, taught by Karin Hobbs OT at 2/27/2025  3:37 PM.  Learner: Patient  Readiness: Acceptance  Method: Explanation  Response: Verbalizes Understanding, Needs Reinforcement  Comment: walker safety      Goals:   Encounter Problems       Encounter Problems (Active)       OT Goals       Increase functional mobility and  functional transfers to cga for bed/chair with dme prn   (Progressing)       Start:  02/27/25    Expected End:  03/13/25            increase bue ther ex/activity x 7-10 minutes and increase standing tolerance x 3-5 minutes with cga to promote greater activity tolerance for assist with adl.   (Progressing)       Start:  02/27/25    Expected End:  03/13/25            Increase ub/lb dressing to cga with dme prn  (Progressing)       Start:  02/27/25    Expected End:  03/13/25            pt. to apply ec/ws techniques with minimal cues to all mobility/transfer/adl to decrease fatigue/promote efficient use of energy toward completion of functional tasks.  (Progressing)       Start:  02/27/25    Expected End:  03/13/25

## 2025-02-27 NOTE — PROGRESS NOTES
Esvin Gupta is a 64 y.o. male on day 3 of admission presenting with Acute renal failure, unspecified acute renal failure type (CMS-HCC).  Record reviewed.  Abdominal ostomy wound managed by Gen Surg.  Nephrology following.  Received a unit of blood overnight, heparin and amiodarone gtts stopped.  IV Abx cefepime, vancomycin discontinued.  This TCC met with patient and brother, Juan, at bedside for discharge planning.  SNF choices are [facility], [place], [facility].  Awaiting therapy shoaib, PT/OT messaged for updates.  DSC tasked with sending referrals.  Care Transitions will continue to follow.

## 2025-02-28 LAB
ANION GAP SERPL CALC-SCNC: 12 MMOL/L (ref 10–20)
BACTERIA BLD CULT: NORMAL
BACTERIA BLD CULT: NORMAL
BUN SERPL-MCNC: 20 MG/DL (ref 6–23)
CALCIUM SERPL-MCNC: 9.3 MG/DL (ref 8.6–10.3)
CHLORIDE SERPL-SCNC: 104 MMOL/L (ref 98–107)
CO2 SERPL-SCNC: 20 MMOL/L (ref 21–32)
CREAT SERPL-MCNC: 0.87 MG/DL (ref 0.5–1.3)
EGFRCR SERPLBLD CKD-EPI 2021: >90 ML/MIN/1.73M*2
ERYTHROCYTE [DISTWIDTH] IN BLOOD BY AUTOMATED COUNT: 15.8 % (ref 11.5–14.5)
GLUCOSE BLD MANUAL STRIP-MCNC: 64 MG/DL (ref 74–99)
GLUCOSE BLD MANUAL STRIP-MCNC: 76 MG/DL (ref 74–99)
GLUCOSE BLD MANUAL STRIP-MCNC: 88 MG/DL (ref 74–99)
GLUCOSE BLD MANUAL STRIP-MCNC: 94 MG/DL (ref 74–99)
GLUCOSE SERPL-MCNC: 80 MG/DL (ref 74–99)
HCT VFR BLD AUTO: 27.7 % (ref 41–52)
HGB BLD-MCNC: 8.9 G/DL (ref 13.5–17.5)
HOLD SPECIMEN: NORMAL
MCH RBC QN AUTO: 27.9 PG (ref 26–34)
MCHC RBC AUTO-ENTMCNC: 32.1 G/DL (ref 32–36)
MCV RBC AUTO: 87 FL (ref 80–100)
NRBC BLD-RTO: 0 /100 WBCS (ref 0–0)
PLATELET # BLD AUTO: 372 X10*3/UL (ref 150–450)
POTASSIUM SERPL-SCNC: 4.1 MMOL/L (ref 3.5–5.3)
RBC # BLD AUTO: 3.19 X10*6/UL (ref 4.5–5.9)
SODIUM SERPL-SCNC: 132 MMOL/L (ref 136–145)
WBC # BLD AUTO: 7 X10*3/UL (ref 4.4–11.3)

## 2025-02-28 PROCEDURE — 2500000004 HC RX 250 GENERAL PHARMACY W/ HCPCS (ALT 636 FOR OP/ED)

## 2025-02-28 PROCEDURE — 99232 SBSQ HOSP IP/OBS MODERATE 35: CPT | Performed by: STUDENT IN AN ORGANIZED HEALTH CARE EDUCATION/TRAINING PROGRAM

## 2025-02-28 PROCEDURE — 82947 ASSAY GLUCOSE BLOOD QUANT: CPT

## 2025-02-28 PROCEDURE — 2500000002 HC RX 250 W HCPCS SELF ADMINISTERED DRUGS (ALT 637 FOR MEDICARE OP, ALT 636 FOR OP/ED)

## 2025-02-28 PROCEDURE — 80048 BASIC METABOLIC PNL TOTAL CA: CPT | Performed by: INTERNAL MEDICINE

## 2025-02-28 PROCEDURE — 2500000001 HC RX 250 WO HCPCS SELF ADMINISTERED DRUGS (ALT 637 FOR MEDICARE OP)

## 2025-02-28 PROCEDURE — 2500000004 HC RX 250 GENERAL PHARMACY W/ HCPCS (ALT 636 FOR OP/ED): Performed by: STUDENT IN AN ORGANIZED HEALTH CARE EDUCATION/TRAINING PROGRAM

## 2025-02-28 PROCEDURE — 76937 US GUIDE VASCULAR ACCESS: CPT

## 2025-02-28 PROCEDURE — 36415 COLL VENOUS BLD VENIPUNCTURE: CPT | Performed by: INTERNAL MEDICINE

## 2025-02-28 PROCEDURE — 1100000001 HC PRIVATE ROOM DAILY

## 2025-02-28 PROCEDURE — 85027 COMPLETE CBC AUTOMATED: CPT | Performed by: INTERNAL MEDICINE

## 2025-02-28 PROCEDURE — 2500000004 HC RX 250 GENERAL PHARMACY W/ HCPCS (ALT 636 FOR OP/ED): Performed by: INTERNAL MEDICINE

## 2025-02-28 RX ORDER — SODIUM CHLORIDE 9 MG/ML
100 INJECTION, SOLUTION INTRAVENOUS CONTINUOUS
Status: DISCONTINUED | OUTPATIENT
Start: 2025-02-28 | End: 2025-03-01

## 2025-02-28 RX ADMIN — APIXABAN 5 MG: 5 TABLET, FILM COATED ORAL at 08:25

## 2025-02-28 RX ADMIN — SODIUM CHLORIDE 500 ML: 9 INJECTION, SOLUTION INTRAVENOUS at 14:54

## 2025-02-28 RX ADMIN — Medication 1 TABLET: at 08:25

## 2025-02-28 RX ADMIN — METOPROLOL TARTRATE 25 MG: 25 TABLET, FILM COATED ORAL at 08:25

## 2025-02-28 RX ADMIN — SODIUM CHLORIDE 100 ML/HR: 9 INJECTION, SOLUTION INTRAVENOUS at 16:06

## 2025-02-28 RX ADMIN — MORPHINE SULFATE 4 MG: 4 INJECTION, SOLUTION INTRAMUSCULAR; INTRAVENOUS at 05:30

## 2025-02-28 RX ADMIN — CEFEPIME 1 G: 1 INJECTION, POWDER, FOR SOLUTION INTRAMUSCULAR; INTRAVENOUS at 08:26

## 2025-02-28 RX ADMIN — NYSTATIN 1 APPLICATION: 100000 POWDER TOPICAL at 20:49

## 2025-02-28 RX ADMIN — TAMSULOSIN HYDROCHLORIDE 0.4 MG: 0.4 CAPSULE ORAL at 08:25

## 2025-02-28 RX ADMIN — ZINC OXIDE 1 APPLICATION: 200 OINTMENT TOPICAL at 05:50

## 2025-02-28 RX ADMIN — PANTOPRAZOLE SODIUM 40 MG: 40 INJECTION, POWDER, FOR SOLUTION INTRAVENOUS at 08:26

## 2025-02-28 RX ADMIN — CEFEPIME 1 G: 1 INJECTION, POWDER, FOR SOLUTION INTRAMUSCULAR; INTRAVENOUS at 16:06

## 2025-02-28 RX ADMIN — METOPROLOL TARTRATE 25 MG: 25 TABLET, FILM COATED ORAL at 20:48

## 2025-02-28 RX ADMIN — NYSTATIN 1 APPLICATION: 100000 POWDER TOPICAL at 08:27

## 2025-02-28 RX ADMIN — APIXABAN 5 MG: 5 TABLET, FILM COATED ORAL at 20:48

## 2025-02-28 RX ADMIN — CEFEPIME 1 G: 1 INJECTION, POWDER, FOR SOLUTION INTRAMUSCULAR; INTRAVENOUS at 01:09

## 2025-02-28 ASSESSMENT — PAIN - FUNCTIONAL ASSESSMENT
PAIN_FUNCTIONAL_ASSESSMENT: 0-10

## 2025-02-28 ASSESSMENT — PAIN SCALES - GENERAL
PAINLEVEL_OUTOF10: 0 - NO PAIN
PAINLEVEL_OUTOF10: 6
PAINLEVEL_OUTOF10: 0 - NO PAIN
PAINLEVEL_OUTOF10: 10 - WORST POSSIBLE PAIN
PAINLEVEL_OUTOF10: 0 - NO PAIN
PAINLEVEL_OUTOF10: 0 - NO PAIN

## 2025-02-28 ASSESSMENT — PAIN DESCRIPTION - LOCATION: LOCATION: ABDOMEN

## 2025-02-28 ASSESSMENT — PAIN DESCRIPTION - ORIENTATION: ORIENTATION: RIGHT

## 2025-02-28 NOTE — PROGRESS NOTES
"Nutrition Follow Up Assessment:     Nutrition History:  Energy Intake: Good > 75 %  Pain affecting nutrition status: N/A  Food and Nutrient History: Pt sitting in bed at visit, patient's brother and friend visiting.  Pt reports eating well and drinking the Nepro.  Feeling much better.  Ileostomy functioning. Na+ 132  Vitamin/Herbal Supplement Use: none noted       Anthropometrics:  Height: 182.9 cm (6' 0.01\")   Weight: 57 kg (125 lb 10.6 oz) (charted at 0600)   BMI (Calculated): 17.04  IBW/kg (Dietitian Calculated): 80.9 kg  Percent of IBW: 90 %       Weight History:     Weight Change %:  Weight History / % Weight Change: 2/28 57kg ??? doubt this weight is accurate, will request cy , 2/27 72kg, 2/25 72.6kg    Nutrition Focused Physical Exam Findings:    Subcutaneous Fat Loss:   Defer Subcutaneous Fat Loss Assessment: Defer all  Defer All Reason: completed 2/25/25  Muscle Wasting:  Defer Muscle Wasting Assessment: Defer all  Defer All Reason: completed 2/25/25  Edema:  Edema: none  Physical Findings:  Skin: Positive  Positive Skin Findings: Pressure injury stage 2  Digestive System Findings: Loose stool (ileostomy)    Nutrition Significant Labs:  CBC Trend:   Results from last 7 days   Lab Units 02/28/25  0513 02/27/25  1108 02/27/25  0419 02/26/25  1540   WBC AUTO x10*3/uL 7.0 5.8 5.2 4.8   RBC AUTO x10*6/uL 3.19* 2.91* 2.78* 2.77*   HEMOGLOBIN g/dL 8.9* 8.0* 7.7* 7.8*   HEMATOCRIT % 27.7* 24.4* 23.0* 22.8*   MCV fL 87 84 83 82   PLATELETS AUTO x10*3/uL 372 334 261 289    , BMP Trend:   Results from last 7 days   Lab Units 02/28/25  0513 02/27/25  0419 02/26/25  0527 02/25/25  1656   GLUCOSE mg/dL 80 79 125* 152*   CALCIUM mg/dL 9.3 8.7 8.1* 8.3*   SODIUM mmol/L 132* 131* 124* 126*   POTASSIUM mmol/L 4.1 3.7 3.6 3.8   CO2 mmol/L 20* 21 23 24   CHLORIDE mmol/L 104 101 93* 93*   BUN mg/dL 20 20 32* 46*   CREATININE mg/dL 0.87 0.92 1.10 1.57*    , BG POCT trend:   Results from last 7 days   Lab Units " 02/28/25  0847 02/28/25  0823 02/27/25  1612 02/27/25  1224 02/27/25  0744   POCT GLUCOSE mg/dL 76 64* 118* 87 80    , Renal Lab Trend:   Results from last 7 days   Lab Units 02/28/25  0513 02/27/25  0419 02/26/25  0527 02/25/25  1656   POTASSIUM mmol/L 4.1 3.7 3.6 3.8   PHOSPHORUS mg/dL  --   --  2.5  --    SODIUM mmol/L 132* 131* 124* 126*   MAGNESIUM mg/dL  --  1.79 1.29*  --    EGFR mL/min/1.73m*2 >90 >90 75 49*   BUN mg/dL 20 20 32* 46*   CREATININE mg/dL 0.87 0.92 1.10 1.57*        Nutrition Specific Medications:  Reviewed     I/O:   Last BM Date: 02/28/25; Stool Appearance: Loose, Liquid (02/28/25 0500)    Dietary Orders (From admission, onward)       Start     Ordered    02/26/25 0833  Adult diet Regular; 1500 mL fluid  Diet effective now        Question Answer Comment   Diet type Regular    Dietary fluid restriction / 24h: 1500 mL fluid        02/26/25 0832    02/25/25 1102  Oral nutritional supplements  Until discontinued        Question Answer Comment   Deliver with Breakfast    Deliver with Dinner    Select supplement: Nepro With Carbsteady        02/25/25 1102    02/24/25 1553  May Not Participate in Room Service  ( ROOM SERVICE MAY NOT PARTICIPATE)  Once        Question:  .  Answer:  Yes    02/24/25 1552                     Estimated Needs:      Method for Estimating Needs: 2030-2325kcals (28-32kcals/kg ABW)     Method for Estimating 24 Hour Protein Needs: 87-109g (1.2-1.5g/g ABW) as renal function permits     Method for Estimating 24 Hour Fluid Needs: per MD  Patient on Order Fluid Restriction: No        Nutrition Diagnosis   Malnutrition Diagnosis  Patient has Malnutrition Diagnosis: Yes  Diagnosis Status: Active  Malnutrition Diagnosis: Severe malnutrition related to chronic disease or condition  Related to: acute on chronic illness with stage 2 colon cancer  As Evidenced by: severe muscle wasting and severe subcutaneous fat loss; >5% weight loss x1mo    Nutrition Diagnosis  Patient has Nutrition  Diagnosis: Yes  Diagnosis Status (1): Active  Nutrition Diagnosis 1: Increased nutrient needs  Related to (1): physiological causes increasing nutrient needs  As Evidenced by (1): wound healing needs and colon cancer; lengthy hospitalization       Nutrition Interventions/Recommendations   Nutrition prescription for oral nutrition    Nutrition Recommendations:  Individualized Nutrition Prescription Provided for : Regular with 1500ml fluid restriction;  Nepro shakes BID (monitor K+ levels and need for restriction)--do not include in fluid allowance    Nutrition Interventions/Goals:   Interventions: Meals and snacks, Medical food supplement  Meals and Snacks: General healthful diet, Fluid-modified diet  Goal: consume >75% of meals--met/ongoing  Medical Food Supplement: Commercial beverage medical food supplement therapy  Goal: consume >75% of Nepro BID (for an additional 420 kcals, 19 gm protein each)--met/.ongoing      Education Documentation  No documentation found.     No questions asked by pt     Nutrition Monitoring and Evaluation   Food/Nutrient Related History Monitoring  Monitoring and Evaluation Plan: Estimated Energy Intake, Fluid intake, Intake / amount of food  Estimated Energy Intake: Energy intake greater or equal to 75% of estimated energy needs  Fluid Intake: Estimated fluid intake  Intake / Amount of food: Consumes at least 75% or more of meals/snacks/supplements    Anthropometric Measurements  Monitoring and Evaluation Plan: Body weight  Body Weight: Body weight - Promote weight restoration, Body weight - Maintain stable weight    Biochemical Data, Medical Tests and Procedures  Monitoring and Evaluation Plan: Electrolyte/renal panel  Electrolyte and Renal Panel: Electrolytes within normal limits, Sodium, Potassium    Physical Exam Findings  Monitoring and Evaluation Plan: Adipose, Digestive System, Muscles, Skin  Adipose Finding: Loss of subcutaneous fat  Digestive System Finding: Loose  stool  Criteria: functioning ostomy  Muscle Finding: Muscle atrophy  Skin Finding: Impaired wound healing - Improved wound healing, Imparied wound healing - Skin to heal  Other: promote healing through adequate nutrition    Goal Status: Some progress toward goal(s)    Time Spent (min): 30 minutes

## 2025-02-28 NOTE — PROGRESS NOTES
Esvin Gupta is a 64 y.o. male on day 4 of admission presenting with Acute renal failure, unspecified acute renal failure type (CMS-HCC).  Record reviewed.  [facility].  [facility]ilities unable to accept.  This TCC met with patient and brother, Toby, at bedside, and provided update.  Facility search extended out 10 miles from [facility].  New SNF list discussed with patient, freedom of choice explained.  DSC tasked with sending referrals to [facility], [facility], [facility], [facility], [facility], and [facility].  Care Transitions will continue to follow.    11:40 am addendum  CAROLINA Maldonado Admission Liaison, made in-person contact with this TCC regarding patient's colostomy needs.  Per patient's nurse, patient has CeraPlus New Image 1 colostomy bag REF [id number], 70mm ring, 57mm max opening.  Liaison updated.  Care Transitions will continue to follow.

## 2025-02-28 NOTE — CARE PLAN
The patient's goals for the shift include  to have ostomy bag remain intact    The clinical goals for the shift include Pt will remain hemodynamically stable    Over the shift, the patient did not make progress toward the following goals. Barriers to progression include pt skin is excoriated, therefore extremely difficult to get good seal on ostomy bag. Recommendations to address these barriers include frequent checks   Problem: Pain - Adult  Goal: Verbalizes/displays adequate comfort level or baseline comfort level  Outcome: Progressing     Problem: Safety - Adult  Goal: Free from fall injury  Outcome: Progressing     Problem: Discharge Planning  Goal: Discharge to home or other facility with appropriate resources  Outcome: Progressing     Problem: Chronic Conditions and Co-morbidities  Goal: Patient's chronic conditions and co-morbidity symptoms are monitored and maintained or improved  Outcome: Progressing     Problem: Nutrition  Goal: Nutrient intake appropriate for maintaining nutritional needs  Outcome: Progressing     Problem: Skin  Goal: Decreased wound size/increased tissue granulation at next dressing change  Outcome: Progressing  Flowsheets (Taken 2/27/2025 2243)  Decreased wound size/increased tissue granulation at next dressing change: Promote sleep for wound healing  Goal: Participates in plan/prevention/treatment measures  Outcome: Progressing  Flowsheets (Taken 2/27/2025 2243)  Participates in plan/prevention/treatment measures:   Discuss with provider PT/OT consult   Elevate heels  Goal: Prevent/manage excess moisture  Outcome: Progressing  Flowsheets (Taken 2/27/2025 2243)  Prevent/manage excess moisture:   Cleanse incontinence/protect with barrier cream   Follow provider orders for dressing changes  Goal: Prevent/minimize sheer/friction injuries  Outcome: Progressing  Flowsheets (Taken 2/27/2025 2243)  Prevent/minimize sheer/friction injuries:   Complete micro-shifts as needed if patient unable.  Adjust patient position to relieve pressure points, not a full turn   HOB 30 degrees or less   Turn/reposition every 2 hours/use positioning/transfer devices   Use pull sheet  Goal: Promote/optimize nutrition  Outcome: Progressing  Flowsheets (Taken 2/27/2025 2243)  Promote/optimize nutrition:   Offer water/supplements/favorite foods   Consume > 50% meals/supplements   Monitor/record intake including meals  Goal: Promote skin healing  Outcome: Progressing  Flowsheets (Taken 2/27/2025 2243)  Promote skin healing:   Assess skin/pad under line(s)/device(s)   Ensure correct size (line/device) and apply per  instructions   Rotate device position/do not position patient on device   Turn/reposition every 2 hours/use positioning/transfer devices     Problem: Fall/Injury  Goal: Not fall by end of shift  Outcome: Progressing  Goal: Be free from injury by end of the shift  Outcome: Progressing  Goal: Verbalize understanding of personal risk factors for fall in the hospital  Outcome: Progressing  Goal: Verbalize understanding of risk factor reduction measures to prevent injury from fall in the home  Outcome: Progressing  Goal: Use assistive devices by end of the shift  Outcome: Progressing  Goal: Pace activities to prevent fatigue by end of the shift  Outcome: Progressing     Problem: Pain  Goal: Takes deep breaths with improved pain control throughout the shift  Outcome: Progressing  Goal: Turns in bed with improved pain control throughout the shift  Outcome: Progressing  Goal: Walks with improved pain control throughout the shift  Outcome: Progressing  Goal: Performs ADL's with improved pain control throughout shift  Outcome: Progressing  Goal: Participates in PT with improved pain control throughout the shift  Outcome: Progressing  Goal: Free from opioid side effects throughout the shift  Outcome: Progressing  Goal: Free from acute confusion related to pain meds throughout the shift  Outcome: Progressing

## 2025-02-28 NOTE — CARE PLAN
Problem: Pain - Adult  Goal: Verbalizes/displays adequate comfort level or baseline comfort level  Outcome: Progressing     Problem: Safety - Adult  Goal: Free from fall injury  Outcome: Progressing     Problem: Discharge Planning  Goal: Discharge to home or other facility with appropriate resources  Outcome: Progressing     Problem: Chronic Conditions and Co-morbidities  Goal: Patient's chronic conditions and co-morbidity symptoms are monitored and maintained or improved  Outcome: Progressing     Problem: Nutrition  Goal: Nutrient intake appropriate for maintaining nutritional needs  Outcome: Progressing     Problem: Skin  Goal: Decreased wound size/increased tissue granulation at next dressing change  Outcome: Progressing  Flowsheets (Taken 2/28/2025 1608)  Decreased wound size/increased tissue granulation at next dressing change: Promote sleep for wound healing  Goal: Participates in plan/prevention/treatment measures  Outcome: Progressing  Flowsheets (Taken 2/28/2025 1608)  Participates in plan/prevention/treatment measures:   Elevate heels   Discuss with provider PT/OT consult  Goal: Prevent/manage excess moisture  Outcome: Progressing  Flowsheets (Taken 2/28/2025 1608)  Prevent/manage excess moisture:   Cleanse incontinence/protect with barrier cream   Moisturize dry skin   Monitor for/manage infection if present  Goal: Prevent/minimize sheer/friction injuries  Outcome: Progressing  Flowsheets (Taken 2/28/2025 1608)  Prevent/minimize sheer/friction injuries:   Complete micro-shifts as needed if patient unable. Adjust patient position to relieve pressure points, not a full turn   Increase activity/out of bed for meals   Use pull sheet   Turn/reposition every 2 hours/use positioning/transfer devices   Utilize specialty bed per algorithm  Goal: Promote/optimize nutrition  Outcome: Progressing  Flowsheets (Taken 2/28/2025 1608)  Promote/optimize nutrition:   Consume > 50% meals/supplements   Monitor/record  intake including meals  Goal: Promote skin healing  Outcome: Progressing  Flowsheets (Taken 2/28/2025 4118)  Promote skin healing:   Assess skin/pad under line(s)/device(s)   Protective dressings over bony prominences   Turn/reposition every 2 hours/use positioning/transfer devices   Ensure correct size (line/device) and apply per  instructions   Rotate device position/do not position patient on device     Problem: Fall/Injury  Goal: Not fall by end of shift  Outcome: Progressing  Goal: Be free from injury by end of the shift  Outcome: Progressing  Goal: Verbalize understanding of personal risk factors for fall in the hospital  Outcome: Progressing  Goal: Verbalize understanding of risk factor reduction measures to prevent injury from fall in the home  Outcome: Progressing  Goal: Use assistive devices by end of the shift  Outcome: Progressing  Goal: Pace activities to prevent fatigue by end of the shift  Outcome: Progressing     Problem: Pain  Goal: Takes deep breaths with improved pain control throughout the shift  Outcome: Progressing  Goal: Turns in bed with improved pain control throughout the shift  Outcome: Progressing  Goal: Walks with improved pain control throughout the shift  Outcome: Progressing  Goal: Performs ADL's with improved pain control throughout shift  Outcome: Progressing  Goal: Participates in PT with improved pain control throughout the shift  Outcome: Progressing  Goal: Free from opioid side effects throughout the shift  Outcome: Progressing  Goal: Free from acute confusion related to pain meds throughout the shift  Outcome: Progressing   The patient's goals for the shift include      The clinical goals for the shift include pt to remain hemodynamically stable

## 2025-02-28 NOTE — PROGRESS NOTES
Esvin Gupta is a 64 y.o. male on day 4 of admission presenting with Acute renal failure, unspecified acute renal failure type (CMS-HCC).      Subjective   No complaints, mild asymptomatic hypotension     Objective     Last Recorded Vitals  /58   Pulse 60   Temp 36.2 °C (97.2 °F) (Temporal)   Resp 19   Wt 57 kg (125 lb 10.6 oz) Comment: charted at 0600  SpO2 97%     Physical Exam  GENERAL: NAD, alert & cooperative, chronically ill appearing  HEENT: normocephalic, atraumatic, sclera clear, MM dry  CARDIAC: regular rate & rhythm, S1/S2  PULMONARY: clear  ABDOMEN: soft, non-distended erythema and excoriations on abd  Ext: no c/c/e  NEURO:no appreciable acute focal deficits  PSYCH: appropriate mood & affect    Assessment/Plan   This patient currently has cardiac telemetry ordered; if you would like to modify or discontinue the telemetry order, click here to go to the orders activity to modify/discontinue the order.    Septic/Hypovolemic Shock   Cellulitis/Contact dermatitis   Acute on Chronic Anemia   DIMITRI - resolved  Hyponatremia - improved  Severe Protein Calorie Malnutrition   pAfib on Eliquis  Chronic diastolic CHF (EF 60-65%, previously reduced 30-35% 12/2024), NICM  HTN  HLD  RA  Stage II colon cancer s/p R colectomy         Currently on Cefepime. Day 5 abx. Urine and blood cultures negative to date. Only identifiable source of sepsis at this time is the cellulitis on abd. Complete 7 days abx. Appreciate gen surg input re: ostomy care  GDMT meds on hold. Was severely hypovolemic on presentation. Will resume as tolerated. With mild though asymptomatic hypotension today will give back some IVF  Tolerating PO intake, gets tube feeds overnight at the SNF. RD recommends to resume if eating < 75% of meals.   Rates controled. Back on Eliquis. On Metoprolol 25mg BID,  DIMITRI and hyperkalemia resolved, Na improved  PT/OT, discharge planning        DVT Prophylaxis: Eliquis  Code Status: FULL CODE  Disposition:  [facility]           Marvin Irving, DO

## 2025-02-28 NOTE — PROGRESS NOTES
NEPHROLOGY PROGRESS NOTE    REASON FOR CONSULT: DIMITRI and hyperkalemia    SUBJECTIVE:  Patient is resting comfortably.  Blood pressure is elevated today.  He denies any pain.  He is eating his breakfast.  Surgery managing the fecal dermatitis around the ostomy site.  No trouble breathing.    OBJECTIVE:    Visit Vitals  BP (!) 181/75   Pulse 68   Temp 36.1 °C (97 °F) (Temporal)   Resp 25          Intake/Output Summary (Last 24 hours) at 2/28/2025 0843  Last data filed at 2/28/2025 0500  Gross per 24 hour   Intake 240 ml   Output 700 ml   Net -460 ml        General: Awake and Alert, In no distress, Cooperative  HEENT: Oral mucosa moist, EOMI  NECK: Supple  CHEST: No crackles, no wheeze, no tachypnea  CVS: S1,S2 heard, no rubs, irregularly irregular  ABD: Soft, Non Tender, BS present, ostomy  EXT: no edema    MEDICATION:    Scheduled medications  apixaban, 5 mg, oral, BID  cefepime, 1 g, intravenous, q8h  insulin lispro, 0-5 Units, subcutaneous, TID AC  metoprolol tartrate, 25 mg, oral, BID  multivitamin with minerals, 1 tablet, oral, Daily  nystatin, 1 Application, Topical, BID  pantoprazole, 40 mg, intravenous, Daily  [Held by provider] sacubitriL-valsartan, 1 tablet, oral, BID  tamsulosin, 0.4 mg, oral, Daily      Continuous medications       PRN medications  PRN medications: acetaminophen **OR** acetaminophen **OR** acetaminophen, alteplase, ipratropium-albuteroL, morphine, ondansetron **OR** ondansetron, zinc oxide     RESULTS:    Lab Results   Component Value Date    WBC 7.0 02/28/2025    HGB 8.9 (L) 02/28/2025    HCT 27.7 (L) 02/28/2025    MCV 87 02/28/2025     02/28/2025        Lab Results   Component Value Date    CREATININE 0.87 02/28/2025    BUN 20 02/28/2025     (L) 02/28/2025    K 4.1 02/28/2025     02/28/2025    CO2 20 (L) 02/28/2025        Radiology Imaging reviewed      ASSESSMENT/PLAN:     1.  DIMITRI: Creatinine is normal today at 0.87.  Bicarb level is slightly on the lower side.   Peak  creatinine was 5.62. He is off IV fluids.  Blood pressure is better.  Can resume his Entresto if needed but will have to be very carefully monitoring his renal function and potassium.    2.    Hyponatremia: Serum sodium has improved to 132.  Initial serum sodium was 118.  Encourage good nutrition.  On 1500 mL fluid restriction.    3.  Anemia: S/p transfusion.  Hemoglobin currently at 8.9.    Thank You very much for allowing me to participate in the care of this Patient    This document was created using dragon dictation and may contain unintended error    Bunny London MD   02/28/25

## 2025-03-01 LAB
ANION GAP SERPL CALC-SCNC: 12 MMOL/L (ref 10–20)
BUN SERPL-MCNC: 17 MG/DL (ref 6–23)
CALCIUM SERPL-MCNC: 9.1 MG/DL (ref 8.6–10.3)
CHLORIDE SERPL-SCNC: 106 MMOL/L (ref 98–107)
CO2 SERPL-SCNC: 20 MMOL/L (ref 21–32)
CREAT SERPL-MCNC: 0.8 MG/DL (ref 0.5–1.3)
EGFRCR SERPLBLD CKD-EPI 2021: >90 ML/MIN/1.73M*2
ERYTHROCYTE [DISTWIDTH] IN BLOOD BY AUTOMATED COUNT: 15.5 % (ref 11.5–14.5)
GLUCOSE BLD MANUAL STRIP-MCNC: 101 MG/DL (ref 74–99)
GLUCOSE BLD MANUAL STRIP-MCNC: 109 MG/DL (ref 74–99)
GLUCOSE BLD MANUAL STRIP-MCNC: 79 MG/DL (ref 74–99)
GLUCOSE BLD MANUAL STRIP-MCNC: 90 MG/DL (ref 74–99)
GLUCOSE SERPL-MCNC: 83 MG/DL (ref 74–99)
HCT VFR BLD AUTO: 27.8 % (ref 41–52)
HGB BLD-MCNC: 9.3 G/DL (ref 13.5–17.5)
MCH RBC QN AUTO: 28.6 PG (ref 26–34)
MCHC RBC AUTO-ENTMCNC: 33.5 G/DL (ref 32–36)
MCV RBC AUTO: 86 FL (ref 80–100)
NRBC BLD-RTO: 0 /100 WBCS (ref 0–0)
PLATELET # BLD AUTO: 373 X10*3/UL (ref 150–450)
POTASSIUM SERPL-SCNC: 4.1 MMOL/L (ref 3.5–5.3)
RBC # BLD AUTO: 3.25 X10*6/UL (ref 4.5–5.9)
SODIUM SERPL-SCNC: 134 MMOL/L (ref 136–145)
WBC # BLD AUTO: 7.7 X10*3/UL (ref 4.4–11.3)

## 2025-03-01 PROCEDURE — 2500000001 HC RX 250 WO HCPCS SELF ADMINISTERED DRUGS (ALT 637 FOR MEDICARE OP): Performed by: STUDENT IN AN ORGANIZED HEALTH CARE EDUCATION/TRAINING PROGRAM

## 2025-03-01 PROCEDURE — 2500000004 HC RX 250 GENERAL PHARMACY W/ HCPCS (ALT 636 FOR OP/ED)

## 2025-03-01 PROCEDURE — 2500000001 HC RX 250 WO HCPCS SELF ADMINISTERED DRUGS (ALT 637 FOR MEDICARE OP)

## 2025-03-01 PROCEDURE — 85027 COMPLETE CBC AUTOMATED: CPT | Performed by: STUDENT IN AN ORGANIZED HEALTH CARE EDUCATION/TRAINING PROGRAM

## 2025-03-01 PROCEDURE — 80048 BASIC METABOLIC PNL TOTAL CA: CPT | Performed by: STUDENT IN AN ORGANIZED HEALTH CARE EDUCATION/TRAINING PROGRAM

## 2025-03-01 PROCEDURE — 36415 COLL VENOUS BLD VENIPUNCTURE: CPT | Performed by: STUDENT IN AN ORGANIZED HEALTH CARE EDUCATION/TRAINING PROGRAM

## 2025-03-01 PROCEDURE — 99232 SBSQ HOSP IP/OBS MODERATE 35: CPT | Performed by: STUDENT IN AN ORGANIZED HEALTH CARE EDUCATION/TRAINING PROGRAM

## 2025-03-01 PROCEDURE — 99231 SBSQ HOSP IP/OBS SF/LOW 25: CPT | Performed by: REGISTERED NURSE

## 2025-03-01 PROCEDURE — 82947 ASSAY GLUCOSE BLOOD QUANT: CPT

## 2025-03-01 PROCEDURE — 99231 SBSQ HOSP IP/OBS SF/LOW 25: CPT | Performed by: SURGERY

## 2025-03-01 PROCEDURE — 2500000004 HC RX 250 GENERAL PHARMACY W/ HCPCS (ALT 636 FOR OP/ED): Performed by: INTERNAL MEDICINE

## 2025-03-01 PROCEDURE — 2500000004 HC RX 250 GENERAL PHARMACY W/ HCPCS (ALT 636 FOR OP/ED): Performed by: STUDENT IN AN ORGANIZED HEALTH CARE EDUCATION/TRAINING PROGRAM

## 2025-03-01 PROCEDURE — 1200000002 HC GENERAL ROOM WITH TELEMETRY DAILY

## 2025-03-01 PROCEDURE — 2500000002 HC RX 250 W HCPCS SELF ADMINISTERED DRUGS (ALT 637 FOR MEDICARE OP, ALT 636 FOR OP/ED)

## 2025-03-01 RX ORDER — SACUBITRIL AND VALSARTAN 24; 26 MG/1; MG/1
1 TABLET, FILM COATED ORAL 2 TIMES DAILY
Status: DISCONTINUED | OUTPATIENT
Start: 2025-03-01 | End: 2025-03-06 | Stop reason: HOSPADM

## 2025-03-01 RX ADMIN — METOPROLOL TARTRATE 25 MG: 25 TABLET, FILM COATED ORAL at 20:12

## 2025-03-01 RX ADMIN — NYSTATIN 1 APPLICATION: 100000 POWDER TOPICAL at 11:16

## 2025-03-01 RX ADMIN — CEFEPIME 1 G: 1 INJECTION, POWDER, FOR SOLUTION INTRAMUSCULAR; INTRAVENOUS at 16:31

## 2025-03-01 RX ADMIN — NYSTATIN 1 APPLICATION: 100000 POWDER TOPICAL at 20:16

## 2025-03-01 RX ADMIN — CEFEPIME 1 G: 1 INJECTION, POWDER, FOR SOLUTION INTRAMUSCULAR; INTRAVENOUS at 01:08

## 2025-03-01 RX ADMIN — APIXABAN 5 MG: 5 TABLET, FILM COATED ORAL at 20:13

## 2025-03-01 RX ADMIN — METOPROLOL TARTRATE 25 MG: 25 TABLET, FILM COATED ORAL at 08:37

## 2025-03-01 RX ADMIN — SACUBITRIL AND VALSARTAN 1 TABLET: 24; 26 TABLET, FILM COATED ORAL at 20:12

## 2025-03-01 RX ADMIN — ACETAMINOPHEN 650 MG: 325 TABLET, FILM COATED ORAL at 20:12

## 2025-03-01 RX ADMIN — APIXABAN 5 MG: 5 TABLET, FILM COATED ORAL at 08:37

## 2025-03-01 RX ADMIN — PANTOPRAZOLE SODIUM 40 MG: 40 INJECTION, POWDER, FOR SOLUTION INTRAVENOUS at 08:38

## 2025-03-01 RX ADMIN — Medication 1 TABLET: at 08:37

## 2025-03-01 RX ADMIN — SACUBITRIL AND VALSARTAN 1 TABLET: 24; 26 TABLET, FILM COATED ORAL at 12:30

## 2025-03-01 RX ADMIN — CEFEPIME 1 G: 1 INJECTION, POWDER, FOR SOLUTION INTRAMUSCULAR; INTRAVENOUS at 08:38

## 2025-03-01 RX ADMIN — TAMSULOSIN HYDROCHLORIDE 0.4 MG: 0.4 CAPSULE ORAL at 08:37

## 2025-03-01 ASSESSMENT — PAIN SCALES - GENERAL
PAINLEVEL_OUTOF10: 0 - NO PAIN
PAINLEVEL_OUTOF10: 5 - MODERATE PAIN
PAINLEVEL_OUTOF10: 0 - NO PAIN
PAINLEVEL_OUTOF10: 3
PAINLEVEL_OUTOF10: 0 - NO PAIN

## 2025-03-01 ASSESSMENT — PAIN - FUNCTIONAL ASSESSMENT
PAIN_FUNCTIONAL_ASSESSMENT: 0-10

## 2025-03-01 NOTE — PROGRESS NOTES
General Surgery Attending Note     My Acute Care Surgery SAMRA (Advanced Practice Provider) evaluated this patient today.  Please see that documentation for details.     I saw the patient with the SAMRA today, and personally evaluated and examined the patient.  My findings are consistent with those of the SAMRA.          Impression:       Hospital day #6     Feels better  Without complaints  Colostomy has remained intact without leakage        Afebrile stable vital signs  Abdomen reveals G-tube intact to the left upper quadrant; well-healed midline incision; the ileostomy stoma appliance is intact and the bag is filled with succus entericus; barrier cream and Desitin on excoriated and dermatitis areas;  abdomen remains benign     General surgical impression is fecal contact dermatitis -continuing to improve     Plan:       Will continue current therapy  Okay to discharge from a general surgical standpoint  Recommended discharge to a facility with excellent wound care and ostomy skills to prevent further leakage and complications thereof  Patient to follow-up with his surgeon Dr. Montalvo as scheduled  Will sign off  Call as needed

## 2025-03-01 NOTE — PROGRESS NOTES
"Esvin Gupta is a 64 y.o. male on day 5 of admission presenting with Acute renal failure, unspecified acute renal failure type (CMS-HCC).    Subjective   Patient states he is still having pain around his ileostomy site- worse just proximal to stoma where skin is still quite erythematous     Denies fever or chills. Awaiting SNF placement        Objective     Physical Exam  Vitals reviewed.   Constitutional:       General: He is not in acute distress.  Cardiovascular:      Rate and Rhythm: Normal rate and regular rhythm.      Comments: NSR 76  Pulmonary:      Effort: No respiratory distress.      Comments: Room air  Abdominal:      General: There is no distension.      Palpations: Abdomen is soft.      Comments: RLQ ileostomy - stoma moist and light red. Liquid light brown output / succus. Surrounding skin significantly erythematous and tender- but blanching. No open skin at this time and no bleeding. Barrier ointment and zinc cream over top   Neurological:      Mental Status: He is alert and oriented to person, place, and time.   Psychiatric:         Behavior: Behavior normal.         Last Recorded Vitals  Blood pressure 147/76, pulse 68, temperature 36.2 °C (97.2 °F), temperature source Temporal, resp. rate 17, height 1.829 m (6' 0.01\"), weight 57.9 kg (127 lb 10.3 oz), SpO2 99%.  Intake/Output last 3 Shifts:  I/O last 3 completed shifts:  In: 1487.4 (25.7 mL/kg) [P.O.:220; I.V.:617.4 (10.7 mL/kg); IV Piggyback:650]  Out: 2725 (47.1 mL/kg) [Urine:750 (0.4 mL/kg/hr); Stool:1975]  Weight: 57.9 kg     Relevant Results  Results for orders placed or performed during the hospital encounter of 02/24/25 (from the past 24 hours)   POCT GLUCOSE   Result Value Ref Range    POCT Glucose 94 74 - 99 mg/dL   CBC   Result Value Ref Range    WBC 7.7 4.4 - 11.3 x10*3/uL    nRBC 0.0 0.0 - 0.0 /100 WBCs    RBC 3.25 (L) 4.50 - 5.90 x10*6/uL    Hemoglobin 9.3 (L) 13.5 - 17.5 g/dL    Hematocrit 27.8 (L) 41.0 - 52.0 %    MCV 86 80 - " 100 fL    MCH 28.6 26.0 - 34.0 pg    MCHC 33.5 32.0 - 36.0 g/dL    RDW 15.5 (H) 11.5 - 14.5 %    Platelets 373 150 - 450 x10*3/uL   Basic Metabolic Panel   Result Value Ref Range    Glucose 83 74 - 99 mg/dL    Sodium 134 (L) 136 - 145 mmol/L    Potassium 4.1 3.5 - 5.3 mmol/L    Chloride 106 98 - 107 mmol/L    Bicarbonate 20 (L) 21 - 32 mmol/L    Anion Gap 12 10 - 20 mmol/L    Urea Nitrogen 17 6 - 23 mg/dL    Creatinine 0.80 0.50 - 1.30 mg/dL    eGFR >90 >60 mL/min/1.73m*2    Calcium 9.1 8.6 - 10.3 mg/dL   POCT GLUCOSE   Result Value Ref Range    POCT Glucose 79 74 - 99 mg/dL         Assessment/Plan   Assessment & Plan    Esvin Gupta is a 64 y.o. male who presented to [facility] ED on 2/24 via EMS from [facility] for evaluation of a wound around his ileostomy. Patient has a history of stage II Colon cancer s/p planned laparoscopic right colectomy on 12/11/2024 with Dr. Montalvo at [facility]. Postoperative course complicated by perforated jejunal diverticulum with feculent peritonitis requiring take-back to OR, exploratory laparotomy, and ultimately diverting ileostomy with staged midline wound closure. His hospital course was complicated by acute respiratory failure with vent dependence s/p Trach/PEG. He required LTAC for rehabilitation for several months. He was ultimately decannulated from trach. Discharged to SNF on 2/17/25. Has been eating regular diet with supplemental nocturnal TF. Patient states that for the last 3 days the nursing home has not had the proper size ostomy bag. As a result, his ileostomy has been leaking all over his skin. Patient has extensive contact dermatitis from stool contamination. There is raw/bleeding skin in dependent portions of his RLQ abdomen.     Impression:  Contact dermatitis RLQ abdomen from improper ostomy care    > this is an extensive wound and will take some time to heal. Skin needs to remain clean, dry, and completely free from stool. Ensuring proper ostomy  pouch fit is essential.      > do not believe this is cellulitis      Recommendations:  - Continue Convex wafer as the initial trial of flat wafer yielded leaking of stool from lateral portion of ostomy pouch  - Continue with Duoderm around the appliance to protect surrounding excoriated skin and facilitate seal  - avoid Stoma paste as this may burn/exacerbate patient's pain  - always use No sting barrier wipes around ostomy prior to bag application   - continue barrier cream and Zinc paste to erythematous skin   - Remainder of care per primary team (no abx needed from surgery standpoint)    Okay for hospital discharge. Patient acknowledged that he needs to follow up with his primary surgeon at some point in near future after completing period of rehabilitation.      Surgery will now sign off. Please re-consult for new issues.    The patient was seen and discussed with the attending surgeon Dr. John ANDRADE spent 15 minutes in the professional and overall care of this patient.      Lea Humphrey, APRN-CNP

## 2025-03-01 NOTE — PROGRESS NOTES
Esvin Gupta is a 64 y.o. male on day 5 of admission presenting with Acute renal failure, unspecified acute renal failure type (CMS-HCC).      Subjective   No complaints, Bp improved     Objective     Last Recorded Vitals  /76   Pulse 68   Temp 36.2 °C (97.2 °F) (Temporal)   Resp 17   Wt 57.9 kg (127 lb 10.3 oz)   SpO2 99%     Physical Exam  GENERAL: NAD, alert & cooperative, chronically ill appearing  HEENT: normocephalic, atraumatic, sclera clear, MM dry  CARDIAC: regular rate & rhythm, S1/S2  PULMONARY: clear  ABDOMEN: soft, non-distended erythema and excoriations on abd  Ext: no c/c/e  NEURO:no appreciable acute focal deficits  PSYCH: appropriate mood & affect    Assessment/Plan     Septic/Hypovolemic Shock   Cellulitis/Contact dermatitis   Acute on Chronic Anemia   DIMITRI - resolved  Hyponatremia - improved  Severe Protein Calorie Malnutrition   pAfib on Eliquis  Chronic diastolic CHF (EF 60-65%, previously reduced 30-35% 12/2024), NICM  HTN  HLD  RA  Stage II colon cancer s/p R colectomy         Currently on Cefepime. Will finish a 7 day course of abx, Urine and blood cultures negative to date. Only identifiable source of sepsis at this time is the cellulitis on abd. Complete. Appreciate gen surg input re: ostomy care  Bps improved, will restart Entresto at lower dose 24-26mg (was on 97-103mg at home), continue current dose Bblocker and Eliquis  Tolerating PO intake, gets tube feeds overnight at the SNF. RD recommends to resume if eating < 75% of meals.   DIMITRI and hyperkalemia resolved, Na improved  PT/OT, discharge planning        DVT Prophylaxis: Eliquis  Code Status: FULL CODE  Disposition: [facility]           Marvin Irving DO

## 2025-03-01 NOTE — CARE PLAN
The patient's goals for the shift include      The clinical goals for the shift include pt will remain HDS through shift      Problem: Pain - Adult  Goal: Verbalizes/displays adequate comfort level or baseline comfort level  Outcome: Progressing     Problem: Safety - Adult  Goal: Free from fall injury  Outcome: Progressing     Problem: Discharge Planning  Goal: Discharge to home or other facility with appropriate resources  Outcome: Progressing     Problem: Skin  Goal: Decreased wound size/increased tissue granulation at next dressing change  3/1/2025 1053 by Sarah Hillman RN  Flowsheets (Taken 3/1/2025 1053)  Decreased wound size/increased tissue granulation at next dressing change:   Promote sleep for wound healing   Protective dressings over bony prominences  3/1/2025 1053 by Sarah Hillman RN  Outcome: Progressing  Flowsheets (Taken 3/1/2025 1053)  Decreased wound size/increased tissue granulation at next dressing change:   Promote sleep for wound healing   Protective dressings over bony prominences  Goal: Participates in plan/prevention/treatment measures  Outcome: Progressing  Goal: Prevent/manage excess moisture  Outcome: Progressing  Goal: Prevent/minimize sheer/friction injuries  Outcome: Progressing  Goal: Promote/optimize nutrition  Outcome: Progressing  Goal: Promote skin healing  Outcome: Progressing

## 2025-03-01 NOTE — CARE PLAN
Problem: Pain - Adult  Goal: Verbalizes/displays adequate comfort level or baseline comfort level  Outcome: Progressing     Problem: Safety - Adult  Goal: Free from fall injury  Outcome: Progressing     Problem: Discharge Planning  Goal: Discharge to home or other facility with appropriate resources  Outcome: Progressing     Problem: Chronic Conditions and Co-morbidities  Goal: Patient's chronic conditions and co-morbidity symptoms are monitored and maintained or improved  Outcome: Progressing     Problem: Nutrition  Goal: Nutrient intake appropriate for maintaining nutritional needs  Outcome: Progressing     Problem: Skin  Goal: Decreased wound size/increased tissue granulation at next dressing change  Outcome: Progressing  Goal: Participates in plan/prevention/treatment measures  Outcome: Progressing  Goal: Prevent/manage excess moisture  Outcome: Progressing  Goal: Prevent/minimize sheer/friction injuries  Outcome: Progressing  Goal: Promote/optimize nutrition  Outcome: Progressing  Goal: Promote skin healing  Outcome: Progressing     Problem: Fall/Injury  Goal: Not fall by end of shift  Outcome: Progressing  Goal: Be free from injury by end of the shift  Outcome: Progressing  Goal: Verbalize understanding of personal risk factors for fall in the hospital  Outcome: Progressing  Goal: Verbalize understanding of risk factor reduction measures to prevent injury from fall in the home  Outcome: Progressing  Goal: Use assistive devices by end of the shift  Outcome: Progressing  Goal: Pace activities to prevent fatigue by end of the shift  Outcome: Progressing     Problem: Pain  Goal: Takes deep breaths with improved pain control throughout the shift  Outcome: Progressing  Goal: Turns in bed with improved pain control throughout the shift  Outcome: Progressing  Goal: Walks with improved pain control throughout the shift  Outcome: Progressing  Goal: Performs ADL's with improved pain control throughout shift  Outcome:  Progressing  Goal: Participates in PT with improved pain control throughout the shift  Outcome: Progressing  Goal: Free from opioid side effects throughout the shift  Outcome: Progressing  Goal: Free from acute confusion related to pain meds throughout the shift  Outcome: Progressing   The patient's goals for the shift include      The clinical goals for the shift include pt will remain HDS throughout shift.    Over the shift, the patient did not make progress toward the following goals. Barriers to progression include. Recommendations to address these barriers include.

## 2025-03-02 VITALS
RESPIRATION RATE: 16 BRPM | SYSTOLIC BLOOD PRESSURE: 132 MMHG | HEIGHT: 72 IN | OXYGEN SATURATION: 99 % | TEMPERATURE: 97.7 F | BODY MASS INDEX: 17.29 KG/M2 | WEIGHT: 127.65 LBS | DIASTOLIC BLOOD PRESSURE: 69 MMHG | HEART RATE: 69 BPM

## 2025-03-02 LAB
ANION GAP SERPL CALC-SCNC: 13 MMOL/L (ref 10–20)
BUN SERPL-MCNC: 17 MG/DL (ref 6–23)
CALCIUM SERPL-MCNC: 8.9 MG/DL (ref 8.6–10.3)
CHLORIDE SERPL-SCNC: 107 MMOL/L (ref 98–107)
CO2 SERPL-SCNC: 18 MMOL/L (ref 21–32)
CREAT SERPL-MCNC: 0.81 MG/DL (ref 0.5–1.3)
EGFRCR SERPLBLD CKD-EPI 2021: >90 ML/MIN/1.73M*2
GLUCOSE BLD MANUAL STRIP-MCNC: 101 MG/DL (ref 74–99)
GLUCOSE BLD MANUAL STRIP-MCNC: 105 MG/DL (ref 74–99)
GLUCOSE BLD MANUAL STRIP-MCNC: 83 MG/DL (ref 74–99)
GLUCOSE SERPL-MCNC: 82 MG/DL (ref 74–99)
POTASSIUM SERPL-SCNC: 3.8 MMOL/L (ref 3.5–5.3)
SODIUM SERPL-SCNC: 134 MMOL/L (ref 136–145)

## 2025-03-02 PROCEDURE — 2500000004 HC RX 250 GENERAL PHARMACY W/ HCPCS (ALT 636 FOR OP/ED)

## 2025-03-02 PROCEDURE — 2500000004 HC RX 250 GENERAL PHARMACY W/ HCPCS (ALT 636 FOR OP/ED): Performed by: STUDENT IN AN ORGANIZED HEALTH CARE EDUCATION/TRAINING PROGRAM

## 2025-03-02 PROCEDURE — 2500000001 HC RX 250 WO HCPCS SELF ADMINISTERED DRUGS (ALT 637 FOR MEDICARE OP): Performed by: STUDENT IN AN ORGANIZED HEALTH CARE EDUCATION/TRAINING PROGRAM

## 2025-03-02 PROCEDURE — 2500000001 HC RX 250 WO HCPCS SELF ADMINISTERED DRUGS (ALT 637 FOR MEDICARE OP)

## 2025-03-02 PROCEDURE — 36415 COLL VENOUS BLD VENIPUNCTURE: CPT | Performed by: STUDENT IN AN ORGANIZED HEALTH CARE EDUCATION/TRAINING PROGRAM

## 2025-03-02 PROCEDURE — 99232 SBSQ HOSP IP/OBS MODERATE 35: CPT | Performed by: STUDENT IN AN ORGANIZED HEALTH CARE EDUCATION/TRAINING PROGRAM

## 2025-03-02 PROCEDURE — 82947 ASSAY GLUCOSE BLOOD QUANT: CPT

## 2025-03-02 PROCEDURE — 2500000002 HC RX 250 W HCPCS SELF ADMINISTERED DRUGS (ALT 637 FOR MEDICARE OP, ALT 636 FOR OP/ED)

## 2025-03-02 PROCEDURE — 1200000002 HC GENERAL ROOM WITH TELEMETRY DAILY

## 2025-03-02 PROCEDURE — 82374 ASSAY BLOOD CARBON DIOXIDE: CPT | Performed by: STUDENT IN AN ORGANIZED HEALTH CARE EDUCATION/TRAINING PROGRAM

## 2025-03-02 RX ADMIN — SACUBITRIL AND VALSARTAN 1 TABLET: 24; 26 TABLET, FILM COATED ORAL at 20:15

## 2025-03-02 RX ADMIN — CEFEPIME 1 G: 1 INJECTION, POWDER, FOR SOLUTION INTRAMUSCULAR; INTRAVENOUS at 09:20

## 2025-03-02 RX ADMIN — APIXABAN 5 MG: 5 TABLET, FILM COATED ORAL at 09:19

## 2025-03-02 RX ADMIN — NYSTATIN 1 APPLICATION: 100000 POWDER TOPICAL at 20:18

## 2025-03-02 RX ADMIN — TAMSULOSIN HYDROCHLORIDE 0.4 MG: 0.4 CAPSULE ORAL at 09:19

## 2025-03-02 RX ADMIN — Medication 1 TABLET: at 09:19

## 2025-03-02 RX ADMIN — CEFEPIME 1 G: 1 INJECTION, POWDER, FOR SOLUTION INTRAMUSCULAR; INTRAVENOUS at 18:34

## 2025-03-02 RX ADMIN — PANTOPRAZOLE SODIUM 40 MG: 40 INJECTION, POWDER, FOR SOLUTION INTRAVENOUS at 09:20

## 2025-03-02 RX ADMIN — SACUBITRIL AND VALSARTAN 1 TABLET: 24; 26 TABLET, FILM COATED ORAL at 09:19

## 2025-03-02 RX ADMIN — APIXABAN 5 MG: 5 TABLET, FILM COATED ORAL at 20:15

## 2025-03-02 RX ADMIN — CEFEPIME 1 G: 1 INJECTION, POWDER, FOR SOLUTION INTRAMUSCULAR; INTRAVENOUS at 01:02

## 2025-03-02 RX ADMIN — ACETAMINOPHEN 650 MG: 325 TABLET, FILM COATED ORAL at 20:15

## 2025-03-02 RX ADMIN — METOPROLOL TARTRATE 25 MG: 25 TABLET, FILM COATED ORAL at 20:15

## 2025-03-02 RX ADMIN — NYSTATIN 1 APPLICATION: 100000 POWDER TOPICAL at 09:20

## 2025-03-02 ASSESSMENT — PAIN - FUNCTIONAL ASSESSMENT
PAIN_FUNCTIONAL_ASSESSMENT: 0-10

## 2025-03-02 ASSESSMENT — PAIN SCALES - GENERAL
PAINLEVEL_OUTOF10: 0 - NO PAIN
PAINLEVEL_OUTOF10: 5 - MODERATE PAIN
PAINLEVEL_OUTOF10: 0 - NO PAIN
PAINLEVEL_OUTOF10: 4

## 2025-03-02 NOTE — CARE PLAN
The patient's goals for the shift include      The clinical goals for the shift include pt will remain hemodynamically stable, ostomy bag will remain in place.    Problem: Pain - Adult  Goal: Verbalizes/displays adequate comfort level or baseline comfort level  Outcome: Progressing     Problem: Safety - Adult  Goal: Free from fall injury  Outcome: Progressing     Problem: Discharge Planning  Goal: Discharge to home or other facility with appropriate resources  Outcome: Progressing     Problem: Chronic Conditions and Co-morbidities  Goal: Patient's chronic conditions and co-morbidity symptoms are monitored and maintained or improved  Outcome: Progressing     Problem: Nutrition  Goal: Nutrient intake appropriate for maintaining nutritional needs  Outcome: Progressing     Problem: Skin  Goal: Decreased wound size/increased tissue granulation at next dressing change  Outcome: Progressing  Goal: Participates in plan/prevention/treatment measures  Outcome: Progressing  Goal: Prevent/manage excess moisture  Outcome: Progressing  Goal: Prevent/minimize sheer/friction injuries  Outcome: Progressing  Goal: Promote/optimize nutrition  Outcome: Progressing  Goal: Promote skin healing  Outcome: Progressing     Problem: Fall/Injury  Goal: Not fall by end of shift  Outcome: Progressing  Goal: Be free from injury by end of the shift  Outcome: Progressing  Goal: Verbalize understanding of personal risk factors for fall in the hospital  Outcome: Progressing  Goal: Verbalize understanding of risk factor reduction measures to prevent injury from fall in the home  Outcome: Progressing  Goal: Use assistive devices by end of the shift  Outcome: Progressing  Goal: Pace activities to prevent fatigue by end of the shift  Outcome: Progressing     Problem: Pain  Goal: Takes deep breaths with improved pain control throughout the shift  Outcome: Progressing  Goal: Turns in bed with improved pain control throughout the shift  Outcome:  Progressing  Goal: Walks with improved pain control throughout the shift  Outcome: Progressing  Goal: Performs ADL's with improved pain control throughout shift  Outcome: Progressing  Goal: Participates in PT with improved pain control throughout the shift  Outcome: Progressing  Goal: Free from opioid side effects throughout the shift  Outcome: Progressing  Goal: Free from acute confusion related to pain meds throughout the shift  Outcome: Progressing

## 2025-03-02 NOTE — CARE PLAN
The clinical goals for the shift include pt will remain hemodynamically stable, ostomy bag will remain in place.      Problem: Pain - Adult  Goal: Verbalizes/displays adequate comfort level or baseline comfort level  Outcome: Progressing     Problem: Safety - Adult  Goal: Free from fall injury  Outcome: Progressing     Problem: Discharge Planning  Goal: Discharge to home or other facility with appropriate resources  Outcome: Progressing     Problem: Chronic Conditions and Co-morbidities  Goal: Patient's chronic conditions and co-morbidity symptoms are monitored and maintained or improved  Outcome: Progressing     Problem: Nutrition  Goal: Nutrient intake appropriate for maintaining nutritional needs  Outcome: Progressing     Problem: Skin  Goal: Decreased wound size/increased tissue granulation at next dressing change  Outcome: Progressing  Flowsheets (Taken 3/1/2025 1053 by Sarah Hillman RN)  Decreased wound size/increased tissue granulation at next dressing change:   Promote sleep for wound healing   Protective dressings over bony prominences  Goal: Participates in plan/prevention/treatment measures  Outcome: Progressing  Flowsheets (Taken 3/1/2025 0634 by Wendy Alexandre RN)  Participates in plan/prevention/treatment measures:   Discuss with provider PT/OT consult   Elevate heels   Increase activity/out of bed for meals  Goal: Prevent/manage excess moisture  Outcome: Progressing  Flowsheets (Taken 3/1/2025 0634 by Wendy Alexandre, RN)  Prevent/manage excess moisture:   Cleanse incontinence/protect with barrier cream   Monitor for/manage infection if present   Follow provider orders for dressing changes  Goal: Prevent/minimize sheer/friction injuries  Outcome: Progressing  Flowsheets (Taken 3/1/2025 0634 by Wendy Alexandre, GAVIN)  Prevent/minimize sheer/friction injuries:   Complete micro-shifts as needed if patient unable. Adjust patient position to relieve pressure points, not a full turn   Increase  activity/out of bed for meals   Use pull sheet   HOB 30 degrees or less   Turn/reposition every 2 hours/use positioning/transfer devices  Goal: Promote/optimize nutrition  Outcome: Progressing  Flowsheets (Taken 3/1/2025 0634 by Wendy Alexandre RN)  Promote/optimize nutrition:   Consume > 50% meals/supplements   Monitor/record intake including meals   Offer water/supplements/favorite foods  Goal: Promote skin healing  Outcome: Progressing  Flowsheets (Taken 3/1/2025 0634 by Wendy Alexandre RN)  Promote skin healing:   Assess skin/pad under line(s)/device(s)   Protective dressings over bony prominences   Turn/reposition every 2 hours/use positioning/transfer devices   Ensure correct size (line/device) and apply per  instructions   Rotate device position/do not position patient on device     Problem: Fall/Injury  Goal: Not fall by end of shift  Outcome: Progressing  Goal: Be free from injury by end of the shift  Outcome: Progressing  Goal: Verbalize understanding of personal risk factors for fall in the hospital  Outcome: Progressing  Goal: Verbalize understanding of risk factor reduction measures to prevent injury from fall in the home  Outcome: Progressing  Goal: Use assistive devices by end of the shift  Outcome: Progressing  Goal: Pace activities to prevent fatigue by end of the shift  Outcome: Progressing     Problem: Pain  Goal: Takes deep breaths with improved pain control throughout the shift  Outcome: Progressing  Goal: Turns in bed with improved pain control throughout the shift  Outcome: Progressing  Goal: Walks with improved pain control throughout the shift  Outcome: Progressing  Goal: Performs ADL's with improved pain control throughout shift  Outcome: Progressing  Goal: Participates in PT with improved pain control throughout the shift  Outcome: Progressing  Goal: Free from opioid side effects throughout the shift  Outcome: Progressing  Goal: Free from acute confusion related to pain meds  throughout the shift  Outcome: Progressing

## 2025-03-02 NOTE — PROGRESS NOTES
Esvin Gupta is a 64 y.o. male on day 6 of admission presenting with Acute renal failure, unspecified acute renal failure type (CMS-HCC).      Subjective   No complaints, Bp improved     Objective     Last Recorded Vitals  /63 (BP Location: Right arm, Patient Position: Lying)   Pulse 58   Temp 36.6 °C (97.9 °F)   Resp 26   Wt 57.9 kg (127 lb 10.3 oz)   SpO2 98%     Physical Exam  GENERAL: NAD, alert & cooperative, chronically ill appearing  HEENT: normocephalic, atraumatic, sclera clear, MM dry  CARDIAC: regular rate & rhythm, S1/S2  PULMONARY: clear  ABDOMEN: soft, non-distended erythema and excoriations on abd markedly improved  Ext: no c/c/e  NEURO:no appreciable acute focal deficits  PSYCH: appropriate mood & affect    Assessment/Plan     Septic/Hypovolemic Shock   Cellulitis/Contact dermatitis   Acute on Chronic Anemia   DIMITRI - resolved  Hyponatremia - improved  Severe Protein Calorie Malnutrition   pAfib on Eliquis  Chronic diastolic CHF (EF 60-65%, previously reduced 30-35% 12/2024), NICM  HTN  HLD  RA  Stage II colon cancer s/p R colectomy         Currently on Cefepime. Will finish a 7 day course of abx (last day tomorrow), Urine and blood cultures negative to date. Only identifiable source of sepsis at this time is the cellulitis on abd. Complete. Appreciate gen surg input re: ostomy care  Bps improved, did restart Entresto at lower dose 24-26mg (was on 97-103mg at home) yesterday,, continue current dose Bblocker and Eliquis  Tolerating PO intake, gets tube feeds overnight at the SNF. RD recommends to resume if eating < 75% of meals.   DIMITRI and hyperkalemia resolved, Na improved  PT/OT, discharge planning, from medical standpoint should be ok for discharge to facility tomorrow        DVT Prophylaxis: Eliquis  Code Status: FULL CODE  Disposition: [facility]           Marvin Irving DO

## 2025-03-03 ENCOUNTER — APPOINTMENT (OUTPATIENT)
Dept: CARDIOLOGY | Facility: HOSPITAL | Age: 65
End: 2025-03-03
Payer: MEDICAID

## 2025-03-03 PROBLEM — N17.9 ACUTE RENAL FAILURE, UNSPECIFIED ACUTE RENAL FAILURE TYPE (CMS-HCC): Status: RESOLVED | Noted: 2025-02-24 | Resolved: 2025-03-03

## 2025-03-03 LAB
ANION GAP SERPL CALC-SCNC: 13 MMOL/L (ref 10–20)
BUN SERPL-MCNC: 20 MG/DL (ref 6–23)
CALCIUM SERPL-MCNC: 9.7 MG/DL (ref 8.6–10.3)
CHLORIDE SERPL-SCNC: 105 MMOL/L (ref 98–107)
CO2 SERPL-SCNC: 19 MMOL/L (ref 21–32)
CREAT SERPL-MCNC: 0.75 MG/DL (ref 0.5–1.3)
EGFRCR SERPLBLD CKD-EPI 2021: >90 ML/MIN/1.73M*2
ERYTHROCYTE [DISTWIDTH] IN BLOOD BY AUTOMATED COUNT: 15.7 % (ref 11.5–14.5)
GLUCOSE BLD MANUAL STRIP-MCNC: 79 MG/DL (ref 74–99)
GLUCOSE BLD MANUAL STRIP-MCNC: 80 MG/DL (ref 74–99)
GLUCOSE BLD MANUAL STRIP-MCNC: 87 MG/DL (ref 74–99)
GLUCOSE SERPL-MCNC: 97 MG/DL (ref 74–99)
HCT VFR BLD AUTO: 31.1 % (ref 41–52)
HGB BLD-MCNC: 10.4 G/DL (ref 13.5–17.5)
MAGNESIUM SERPL-MCNC: 1.62 MG/DL (ref 1.6–2.4)
MCH RBC QN AUTO: 28.4 PG (ref 26–34)
MCHC RBC AUTO-ENTMCNC: 33.4 G/DL (ref 32–36)
MCV RBC AUTO: 85 FL (ref 80–100)
NRBC BLD-RTO: 0 /100 WBCS (ref 0–0)
PLATELET # BLD AUTO: 470 X10*3/UL (ref 150–450)
POTASSIUM SERPL-SCNC: 3.9 MMOL/L (ref 3.5–5.3)
RBC # BLD AUTO: 3.66 X10*6/UL (ref 4.5–5.9)
SODIUM SERPL-SCNC: 133 MMOL/L (ref 136–145)
WBC # BLD AUTO: 9.1 X10*3/UL (ref 4.4–11.3)

## 2025-03-03 PROCEDURE — 99255 IP/OBS CONSLTJ NEW/EST HI 80: CPT | Performed by: INTERNAL MEDICINE

## 2025-03-03 PROCEDURE — 82374 ASSAY BLOOD CARBON DIOXIDE: CPT | Performed by: STUDENT IN AN ORGANIZED HEALTH CARE EDUCATION/TRAINING PROGRAM

## 2025-03-03 PROCEDURE — 97535 SELF CARE MNGMENT TRAINING: CPT | Mod: GP,CQ

## 2025-03-03 PROCEDURE — 2500000004 HC RX 250 GENERAL PHARMACY W/ HCPCS (ALT 636 FOR OP/ED)

## 2025-03-03 PROCEDURE — 2500000001 HC RX 250 WO HCPCS SELF ADMINISTERED DRUGS (ALT 637 FOR MEDICARE OP)

## 2025-03-03 PROCEDURE — 85027 COMPLETE CBC AUTOMATED: CPT | Performed by: STUDENT IN AN ORGANIZED HEALTH CARE EDUCATION/TRAINING PROGRAM

## 2025-03-03 PROCEDURE — 36415 COLL VENOUS BLD VENIPUNCTURE: CPT | Performed by: STUDENT IN AN ORGANIZED HEALTH CARE EDUCATION/TRAINING PROGRAM

## 2025-03-03 PROCEDURE — 1200000002 HC GENERAL ROOM WITH TELEMETRY DAILY

## 2025-03-03 PROCEDURE — 83735 ASSAY OF MAGNESIUM: CPT | Performed by: STUDENT IN AN ORGANIZED HEALTH CARE EDUCATION/TRAINING PROGRAM

## 2025-03-03 PROCEDURE — 2500000004 HC RX 250 GENERAL PHARMACY W/ HCPCS (ALT 636 FOR OP/ED): Performed by: STUDENT IN AN ORGANIZED HEALTH CARE EDUCATION/TRAINING PROGRAM

## 2025-03-03 PROCEDURE — 2500000002 HC RX 250 W HCPCS SELF ADMINISTERED DRUGS (ALT 637 FOR MEDICARE OP, ALT 636 FOR OP/ED)

## 2025-03-03 PROCEDURE — 82947 ASSAY GLUCOSE BLOOD QUANT: CPT

## 2025-03-03 PROCEDURE — 93005 ELECTROCARDIOGRAM TRACING: CPT

## 2025-03-03 PROCEDURE — 97535 SELF CARE MNGMENT TRAINING: CPT | Mod: CO,GO

## 2025-03-03 PROCEDURE — 93010 ELECTROCARDIOGRAM REPORT: CPT | Performed by: STUDENT IN AN ORGANIZED HEALTH CARE EDUCATION/TRAINING PROGRAM

## 2025-03-03 PROCEDURE — 2500000001 HC RX 250 WO HCPCS SELF ADMINISTERED DRUGS (ALT 637 FOR MEDICARE OP): Performed by: STUDENT IN AN ORGANIZED HEALTH CARE EDUCATION/TRAINING PROGRAM

## 2025-03-03 PROCEDURE — 99232 SBSQ HOSP IP/OBS MODERATE 35: CPT | Performed by: STUDENT IN AN ORGANIZED HEALTH CARE EDUCATION/TRAINING PROGRAM

## 2025-03-03 RX ORDER — METOPROLOL TARTRATE 50 MG/1
50 TABLET ORAL 2 TIMES DAILY
Status: DISCONTINUED | OUTPATIENT
Start: 2025-03-03 | End: 2025-03-06 | Stop reason: HOSPADM

## 2025-03-03 RX ORDER — METOPROLOL TARTRATE 1 MG/ML
5 INJECTION, SOLUTION INTRAVENOUS EVERY 4 HOURS PRN
Status: DISCONTINUED | OUTPATIENT
Start: 2025-03-03 | End: 2025-03-06 | Stop reason: HOSPADM

## 2025-03-03 RX ORDER — METOPROLOL TARTRATE 1 MG/ML
5 INJECTION, SOLUTION INTRAVENOUS ONCE
Status: DISCONTINUED | OUTPATIENT
Start: 2025-03-03 | End: 2025-03-03

## 2025-03-03 RX ORDER — DIGOXIN 0.25 MG/ML
500 INJECTION INTRAMUSCULAR; INTRAVENOUS ONCE
Status: COMPLETED | OUTPATIENT
Start: 2025-03-03 | End: 2025-03-03

## 2025-03-03 RX ORDER — METOPROLOL TARTRATE 25 MG/1
25 TABLET, FILM COATED ORAL ONCE
Status: COMPLETED | OUTPATIENT
Start: 2025-03-03 | End: 2025-03-03

## 2025-03-03 RX ORDER — METOPROLOL TARTRATE 1 MG/ML
5 INJECTION, SOLUTION INTRAVENOUS ONCE
Status: COMPLETED | OUTPATIENT
Start: 2025-03-03 | End: 2025-03-03

## 2025-03-03 RX ORDER — MAGNESIUM SULFATE HEPTAHYDRATE 40 MG/ML
2 INJECTION, SOLUTION INTRAVENOUS ONCE
Status: COMPLETED | OUTPATIENT
Start: 2025-03-03 | End: 2025-03-03

## 2025-03-03 RX ADMIN — TAMSULOSIN HYDROCHLORIDE 0.4 MG: 0.4 CAPSULE ORAL at 08:19

## 2025-03-03 RX ADMIN — DIGOXIN 500 MCG: 0.25 INJECTION INTRAMUSCULAR; INTRAVENOUS at 11:47

## 2025-03-03 RX ADMIN — CEFEPIME 1 G: 1 INJECTION, POWDER, FOR SOLUTION INTRAMUSCULAR; INTRAVENOUS at 01:18

## 2025-03-03 RX ADMIN — SODIUM CHLORIDE 1000 ML: 9 INJECTION, SOLUTION INTRAVENOUS at 11:41

## 2025-03-03 RX ADMIN — NYSTATIN 1 APPLICATION: 100000 POWDER TOPICAL at 08:20

## 2025-03-03 RX ADMIN — METOPROLOL TARTRATE 5 MG: 5 INJECTION INTRAVENOUS at 10:45

## 2025-03-03 RX ADMIN — Medication 1 TABLET: at 08:19

## 2025-03-03 RX ADMIN — APIXABAN 5 MG: 5 TABLET, FILM COATED ORAL at 08:19

## 2025-03-03 RX ADMIN — METOPROLOL TARTRATE 25 MG: 25 TABLET, FILM COATED ORAL at 08:19

## 2025-03-03 RX ADMIN — CEFEPIME 1 G: 1 INJECTION, POWDER, FOR SOLUTION INTRAMUSCULAR; INTRAVENOUS at 08:19

## 2025-03-03 RX ADMIN — METOPROLOL TARTRATE 25 MG: 25 TABLET, FILM COATED ORAL at 10:55

## 2025-03-03 RX ADMIN — ACETAMINOPHEN 650 MG: 325 TABLET, FILM COATED ORAL at 08:27

## 2025-03-03 RX ADMIN — SACUBITRIL AND VALSARTAN 1 TABLET: 24; 26 TABLET, FILM COATED ORAL at 08:19

## 2025-03-03 RX ADMIN — PANTOPRAZOLE SODIUM 40 MG: 40 INJECTION, POWDER, FOR SOLUTION INTRAVENOUS at 08:19

## 2025-03-03 RX ADMIN — MAGNESIUM SULFATE HEPTAHYDRATE 2 G: 40 INJECTION, SOLUTION INTRAVENOUS at 13:34

## 2025-03-03 ASSESSMENT — PAIN SCALES - GENERAL
PAINLEVEL_OUTOF10: 0 - NO PAIN
PAINLEVEL_OUTOF10: 3
PAINLEVEL_OUTOF10: 0 - NO PAIN
PAINLEVEL_OUTOF10: 0 - NO PAIN
PAINLEVEL_OUTOF10: 3

## 2025-03-03 ASSESSMENT — COGNITIVE AND FUNCTIONAL STATUS - GENERAL
MOBILITY SCORE: 14
DRESSING REGULAR UPPER BODY CLOTHING: A LITTLE
STANDING UP FROM CHAIR USING ARMS: A LITTLE
DAILY ACTIVITIY SCORE: 15
HELP NEEDED FOR BATHING: A LOT
CLIMB 3 TO 5 STEPS WITH RAILING: TOTAL
MOVING TO AND FROM BED TO CHAIR: A LOT
EATING MEALS: A LITTLE
DRESSING REGULAR LOWER BODY CLOTHING: A LOT
WALKING IN HOSPITAL ROOM: A LOT
PERSONAL GROOMING: A LITTLE
TOILETING: A LOT
TURNING FROM BACK TO SIDE WHILE IN FLAT BAD: A LITTLE
MOVING FROM LYING ON BACK TO SITTING ON SIDE OF FLAT BED WITH BEDRAILS: A LITTLE

## 2025-03-03 ASSESSMENT — PAIN - FUNCTIONAL ASSESSMENT
PAIN_FUNCTIONAL_ASSESSMENT: 0-10

## 2025-03-03 ASSESSMENT — ACTIVITIES OF DAILY LIVING (ADL): HOME_MANAGEMENT_TIME_ENTRY: 15

## 2025-03-03 NOTE — PROGRESS NOTES
NEPHROLOGY PROGRESS NOTE    REASON FOR CONSULT: DIMITRI and hyperkalemia    SUBJECTIVE:  Patient has a good appetite.  No trouble breathing.  Pain is controlled.  He likely will be going to skilled nursing facility.      OBJECTIVE:    Visit Vitals  /60 (BP Location: Right arm, Patient Position: Lying)   Pulse 64   Temp 36.1 °C (97 °F) (Temporal)   Resp 16          Intake/Output Summary (Last 24 hours) at 3/3/2025 0911  Last data filed at 3/3/2025 0800  Gross per 24 hour   Intake 600 ml   Output 2700 ml   Net -2100 ml        General: Awake and Alert, In no distress, Cooperative  HEENT: Oral mucosa moist, EOMI  NECK: Supple  CHEST: No crackles, no wheeze, no tachypnea  CVS: S1,S2 heard, no rubs, irregularly irregular  ABD: Soft, Non Tender, BS present, ostomy  EXT: no edema    MEDICATION:    Scheduled medications  apixaban, 5 mg, oral, BID  cefepime, 1 g, intravenous, q8h  insulin lispro, 0-5 Units, subcutaneous, TID AC  metoprolol tartrate, 25 mg, oral, BID  multivitamin with minerals, 1 tablet, oral, Daily  nystatin, 1 Application, Topical, BID  pantoprazole, 40 mg, intravenous, Daily  sacubitriL-valsartan, 1 tablet, oral, BID  tamsulosin, 0.4 mg, oral, Daily      Continuous medications       PRN medications  PRN medications: acetaminophen **OR** acetaminophen **OR** acetaminophen, alteplase, ipratropium-albuteroL, morphine, ondansetron **OR** ondansetron, zinc oxide     RESULTS:    Lab Results   Component Value Date    WBC 7.7 03/01/2025    HGB 9.3 (L) 03/01/2025    HCT 27.8 (L) 03/01/2025    MCV 86 03/01/2025     03/01/2025        Lab Results   Component Value Date    CREATININE 0.81 03/02/2025    BUN 17 03/02/2025     (L) 03/02/2025    K 3.8 03/02/2025     03/02/2025    CO2 18 (L) 03/02/2025        Radiology Imaging reviewed      ASSESSMENT/PLAN:     1.  DIMITRI: Renal function is normal.  Back on the Entresto. he has heart failure with low EF. Potassium level at 3.8.  Peak creatinine was 5.62.   Stable for discharge from nephrology standpoint.  Please do not hesitate to contact me for any questions.  Nephrology service will sign off.    2.    Hyponatremia: Serum sodium has improved to 134.  Initial serum sodium was 118.  Encourage good nutrition.  On 1500 mL fluid restriction.    3.  Anemia: S/p transfusion.  Hemoglobin currently at 9.3 .    Thank You very much for allowing me to participate in the care of this Patient    This document was created using dragon dictation and may contain unintended error    Bunny London MD   03/03/25

## 2025-03-03 NOTE — PROGRESS NOTES
Physical Therapy    Physical Therapy Treatment    Patient Name: Esvin Gupta  MRN: [id number]  Department: [facility]  Room: [address] [place], [place] [address]A  Today's Date: 3/3/2025  Time Calculation  Start Time: 1016  Stop Time: 1043  Time Calculation (min): 27 min         Assessment/Plan   PT Assessment  End of Session Communication: Bedside nurse, PCT/NA/CTA  End of Session Patient Position: Up in chair, Alarm on     PT Plan  Treatment/Interventions: Bed mobility, Transfer training, Gait training, Balance training, Strengthening, Endurance training, Therapeutic exercise, Therapeutic activity  PT Plan: Ongoing PT  PT Frequency: 3 times per week  PT Discharge Recommendations: Moderate intensity level of continued care  PT - OK to Discharge: Yes      General Visit Information:   PT  Visit  PT Received On: 03/03/25  General  Family/Caregiver Present: Yes (visitor at bedside upon therapy arrival though stepping out of room near start of tx session)  Co-Treatment: OT  Co-Treatment Reason: to maximize patient safety/abilities  Prior to Session Communication: Bedside nurse  Patient Position Received: Bed, 3 rail up, Alarm on  General Comment:  (pt agreeable to participate in therapy)    Subjective   Precautions:  Precautions  Medical Precautions: Fall precautions  Precautions Comment: tele; piv removed by RN start of tx; ileostomy    Vital Signs Comment:  after transferring to chair pt's HR noted to be in 160s though getting conflicting readings.  BP taken: 194/91;  mmHg.  RN, PCNA, and eventually attending present in room; pt found to be in Afib with RVR - given metoprolol after which vitals returned to normal limits.  (other vitals remaining WNL through tx: SpO2 mid to upper 90s and resp rate ~20.)  RN still present upon therapy departure.    Objective   Pain:  Pain Assessment  Pain Assessment:  (no pain complaints at this time)     Treatments:  Bed Mobility  Bed Mobility:  (sup > sit with SBA)    Ambulation/Gait  Training  Ambulation/Gait Training Performed:  (pt able to maintain brief static stance followed by completing a few sidesteps along EOB as well as a few steps to turn bed > chair.  FWW and min A x 2.  pt c/o feeling mild SOB; see vitals section.)    Transfers  Transfer:  (sit <> stand with FWW and min A x 1.  pt initially transferring with BLEs very far forward; increased time needed to achieve full upright stance with feet placed under GADIEL.)    Outcome Measures:  [facility] Basic Mobility  Turning from your back to your side while in a flat bed without using bedrails: A little  Moving from lying on your back to sitting on the side of a flat bed without using bedrails: A little  Moving to and from bed to chair (including a wheelchair): A lot  Standing up from a chair using your arms (e.g. wheelchair or bedside chair): A little  To walk in hospital room: A lot  Climbing 3-5 steps with railing: Total  Basic Mobility - Total Score: 14    FSS-ICU  Ambulation: Walks <50 feet with any assistance x1 or walks any distance with assistance x2 people  Rolling: Minimal assistance (performs 75% or more of task)  Sitting: Minimal assistance (performs 75% or more of task)  Transfer Sit-to-Stand: Minimal assistance (performs 75% or more of task)  Transfer Supine-to-Sit: Minimal assistance (performs 75% or more of task)  Total Score: 17     ICU Mobility Scale: Transferring bed to chair [5]    Education Documentation  Mobility Training, taught by Smita Hart PTA at 3/3/2025 11:27 AM.  Learner: Patient  Readiness: Acceptance  Method: Explanation  Response: Verbalizes Understanding    Education Comments  No comments found.        EDUCATION:       Encounter Problems       Encounter Problems (Active)       PT Problem       PT Goal 1 STG - Pt will amb 25' using WW with SBA  (Progressing)       Start:  02/27/25    Expected End:  03/06/25            PT Goal 2 STG - Pt will transition supine <> sitting with SUP (Progressing)       Start:   02/27/25    Expected End:  03/06/25            PT Goal 3 STG - Pt will SPT bed <> chair with SBA (Progressing)       Start:  02/27/25    Expected End:  03/06/25            PT Goal 4 STG - Pt will transfer STS with SBA (Progressing)       Start:  02/27/25    Expected End:  03/06/25

## 2025-03-03 NOTE — CARE PLAN
The patient's goals for the shift include      The clinical goals for the shift include remain hds      Problem: Skin  Goal: Decreased wound size/increased tissue granulation at next dressing change  3/3/2025 0922 by Cait Segal RN  Outcome: Progressing  3/3/2025 0921 by Cait Segal RN  Outcome: Progressing  3/3/2025 0921 by Cait Segal RN  Outcome: Progressing  3/3/2025 0920 by Cait Segal RN  Outcome: Progressing  3/3/2025 0919 by Cait Segal RN  Outcome: Progressing

## 2025-03-03 NOTE — CARE PLAN
The clinical goals for the shift include pt will remain hemodynamically stable and get adequate sleep, ostomy will remain in place        Problem: Pain - Adult  Goal: Verbalizes/displays adequate comfort level or baseline comfort level  Outcome: Progressing     Problem: Safety - Adult  Goal: Free from fall injury  Outcome: Progressing     Problem: Discharge Planning  Goal: Discharge to home or other facility with appropriate resources  Outcome: Progressing     Problem: Chronic Conditions and Co-morbidities  Goal: Patient's chronic conditions and co-morbidity symptoms are monitored and maintained or improved  Outcome: Progressing     Problem: Nutrition  Goal: Nutrient intake appropriate for maintaining nutritional needs  Outcome: Progressing     Problem: Skin  Goal: Decreased wound size/increased tissue granulation at next dressing change  Outcome: Progressing  Flowsheets (Taken 3/1/2025 1053 by Sarah Hillman RN)  Decreased wound size/increased tissue granulation at next dressing change:   Promote sleep for wound healing   Protective dressings over bony prominences  Goal: Participates in plan/prevention/treatment measures  Outcome: Progressing  Flowsheets (Taken 3/1/2025 0634 by Wendy Alexandre RN)  Participates in plan/prevention/treatment measures:   Discuss with provider PT/OT consult   Elevate heels   Increase activity/out of bed for meals  Goal: Prevent/manage excess moisture  Outcome: Progressing  Flowsheets (Taken 3/1/2025 0634 by Wendy Alexandre, RN)  Prevent/manage excess moisture:   Cleanse incontinence/protect with barrier cream   Monitor for/manage infection if present   Follow provider orders for dressing changes  Goal: Prevent/minimize sheer/friction injuries  Outcome: Progressing  Flowsheets (Taken 3/1/2025 0634 by Wendy Alexandre, GAVIN)  Prevent/minimize sheer/friction injuries:   Complete micro-shifts as needed if patient unable. Adjust patient position to relieve pressure points, not a full  turn   Increase activity/out of bed for meals   Use pull sheet   HOB 30 degrees or less   Turn/reposition every 2 hours/use positioning/transfer devices  Goal: Promote/optimize nutrition  Outcome: Progressing  Flowsheets (Taken 3/1/2025 0634 by Wendy Alexandre RN)  Promote/optimize nutrition:   Consume > 50% meals/supplements   Monitor/record intake including meals   Offer water/supplements/favorite foods  Goal: Promote skin healing  Outcome: Progressing  Flowsheets (Taken 3/1/2025 0634 by Wendy Alexandre RN)  Promote skin healing:   Assess skin/pad under line(s)/device(s)   Protective dressings over bony prominences   Turn/reposition every 2 hours/use positioning/transfer devices   Ensure correct size (line/device) and apply per  instructions   Rotate device position/do not position patient on device     Problem: Fall/Injury  Goal: Not fall by end of shift  Outcome: Progressing  Goal: Be free from injury by end of the shift  Outcome: Progressing  Goal: Verbalize understanding of personal risk factors for fall in the hospital  Outcome: Progressing  Goal: Verbalize understanding of risk factor reduction measures to prevent injury from fall in the home  Outcome: Progressing  Goal: Use assistive devices by end of the shift  Outcome: Progressing  Goal: Pace activities to prevent fatigue by end of the shift  Outcome: Progressing     Problem: Pain  Goal: Takes deep breaths with improved pain control throughout the shift  Outcome: Progressing  Goal: Turns in bed with improved pain control throughout the shift  Outcome: Progressing  Goal: Walks with improved pain control throughout the shift  Outcome: Progressing  Goal: Performs ADL's with improved pain control throughout shift  Outcome: Progressing  Goal: Participates in PT with improved pain control throughout the shift  Outcome: Progressing  Goal: Free from opioid side effects throughout the shift  Outcome: Progressing  Goal: Free from acute confusion  related to pain meds throughout the shift  Outcome: Progressing

## 2025-03-03 NOTE — CARE PLAN
The patient's goals for the shift include      The clinical goals for the shift include remain hds      Problem: Pain - Adult  Goal: Verbalizes/displays adequate comfort level or baseline comfort level  3/3/2025 0920 by Cait Segal RN  Outcome: Progressing  3/3/2025 0919 by Cait Segal RN  Outcome: Progressing     Problem: Safety - Adult  Goal: Free from fall injury  3/3/2025 0920 by Cait Segal RN  Outcome: Progressing  3/3/2025 0919 by Cait Segal RN  Outcome: Progressing     Problem: Discharge Planning  Goal: Discharge to home or other facility with appropriate resources  3/3/2025 0920 by Cait Segal RN  Outcome: Progressing  3/3/2025 0919 by Cait Segal RN  Outcome: Progressing     Problem: Chronic Conditions and Co-morbidities  Goal: Patient's chronic conditions and co-morbidity symptoms are monitored and maintained or improved  3/3/2025 0920 by Cait Segal RN  Outcome: Progressing  3/3/2025 0919 by Cait Segal RN  Outcome: Progressing     Problem: Nutrition  Goal: Nutrient intake appropriate for maintaining nutritional needs  3/3/2025 0920 by Cait Segal RN  Outcome: Progressing  3/3/2025 0919 by Cait Segal RN  Outcome: Progressing     Problem: Skin  Goal: Decreased wound size/increased tissue granulation at next dressing change  3/3/2025 0920 by Cait Segal RN  Outcome: Progressing  3/3/2025 0919 by Cait Segal RN  Outcome: Progressing  Goal: Participates in plan/prevention/treatment measures  3/3/2025 0920 by Cait Segal RN  Outcome: Progressing  3/3/2025 0919 by Cait Segal RN  Outcome: Progressing  Goal: Prevent/manage excess moisture  3/3/2025 0920 by Cait Segal RN  Outcome: Progressing  3/3/2025 0919 by Cait Segal RN  Outcome: Progressing  Goal: Prevent/minimize sheer/friction injuries  3/3/2025 0920 by Cait Segal RN  Outcome: Progressing  3/3/2025 0919 by Cait Segal RN  Outcome: Progressing  Goal: Promote/optimize nutrition  3/3/2025 0920 by Cait Segal RN  Outcome:  Progressing  3/3/2025 0919 by Cait Segal RN  Outcome: Progressing  Goal: Promote skin healing  3/3/2025 0920 by Cait Segal RN  Outcome: Progressing  3/3/2025 0919 by Cait Segal RN  Outcome: Progressing     Problem: Fall/Injury  Goal: Not fall by end of shift  3/3/2025 0920 by Cait Segal RN  Outcome: Progressing  3/3/2025 0919 by Cait Segal RN  Outcome: Progressing  Goal: Be free from injury by end of the shift  3/3/2025 0920 by Cait Segal RN  Outcome: Progressing  3/3/2025 0919 by Cait Segal RN  Outcome: Progressing  Goal: Verbalize understanding of personal risk factors for fall in the hospital  3/3/2025 0920 by Cait Segal RN  Outcome: Progressing  3/3/2025 0919 by Cait Segal RN  Outcome: Progressing  Goal: Verbalize understanding of risk factor reduction measures to prevent injury from fall in the home  3/3/2025 0920 by Cait Segal RN  Outcome: Progressing  3/3/2025 0919 by Cait Segal RN  Outcome: Progressing  Goal: Use assistive devices by end of the shift  3/3/2025 0920 by Cait Segal RN  Outcome: Progressing  3/3/2025 0919 by Cait Segal RN  Outcome: Progressing  Goal: Pace activities to prevent fatigue by end of the shift  3/3/2025 0920 by Cait Segal RN  Outcome: Progressing  3/3/2025 0919 by Cait Segal RN  Outcome: Progressing     Problem: Pain  Goal: Takes deep breaths with improved pain control throughout the shift  3/3/2025 0920 by Cait Segal RN  Outcome: Progressing  3/3/2025 0919 by Cait Segal RN  Outcome: Progressing  Goal: Turns in bed with improved pain control throughout the shift  3/3/2025 0920 by Cait Segal RN  Outcome: Progressing  3/3/2025 0919 by Cait Segal RN  Outcome: Progressing  Goal: Walks with improved pain control throughout the shift  3/3/2025 0920 by Cait Philipp, RN  Outcome: Progressing  3/3/2025 0919 by Cait Segal RN  Outcome: Progressing  Goal: Performs ADL's with improved pain control throughout shift  3/3/2025 0920 by Cait Segal RN  Outcome:  Progressing  3/3/2025 0919 by Cait Segal RN  Outcome: Progressing  Goal: Participates in PT with improved pain control throughout the shift  3/3/2025 0920 by Cait Segal RN  Outcome: Progressing  3/3/2025 0919 by Cait Segal RN  Outcome: Progressing  Goal: Free from opioid side effects throughout the shift  3/3/2025 0920 by Cait Segal RN  Outcome: Progressing  3/3/2025 0919 by Cait Segal RN  Outcome: Progressing  Goal: Free from acute confusion related to pain meds throughout the shift  3/3/2025 0920 by Cait Segal RN  Outcome: Progressing  3/3/2025 0919 by Cait Segal RN  Outcome: Progressing

## 2025-03-03 NOTE — CARE PLAN
The patient's goals for the shift include      The clinical goals for the shift include pt will remain hds  Problem: Pain - Adult  Goal: Verbalizes/displays adequate comfort level or baseline comfort level  Outcome: Progressing     Problem: Safety - Adult  Goal: Free from fall injury  Outcome: Progressing     Problem: Discharge Planning  Goal: Discharge to home or other facility with appropriate resources  Outcome: Progressing     Problem: Chronic Conditions and Co-morbidities  Goal: Patient's chronic conditions and co-morbidity symptoms are monitored and maintained or improved  Outcome: Progressing     Problem: Nutrition  Goal: Nutrient intake appropriate for maintaining nutritional needs  Outcome: Progressing     Problem: Skin  Goal: Decreased wound size/increased tissue granulation at next dressing change  Outcome: Progressing  Goal: Participates in plan/prevention/treatment measures  Outcome: Progressing  Goal: Prevent/manage excess moisture  Outcome: Progressing  Goal: Prevent/minimize sheer/friction injuries  Outcome: Progressing  Goal: Promote/optimize nutrition  Outcome: Progressing  Goal: Promote skin healing  Outcome: Progressing     Problem: Fall/Injury  Goal: Not fall by end of shift  Outcome: Progressing  Goal: Be free from injury by end of the shift  Outcome: Progressing  Goal: Verbalize understanding of personal risk factors for fall in the hospital  Outcome: Progressing  Goal: Verbalize understanding of risk factor reduction measures to prevent injury from fall in the home  Outcome: Progressing  Goal: Use assistive devices by end of the shift  Outcome: Progressing  Goal: Pace activities to prevent fatigue by end of the shift  Outcome: Progressing     Problem: Pain  Goal: Takes deep breaths with improved pain control throughout the shift  Outcome: Progressing  Goal: Turns in bed with improved pain control throughout the shift  Outcome: Progressing  Goal: Walks with improved pain control throughout the  shift  Outcome: Progressing  Goal: Performs ADL's with improved pain control throughout shift  Outcome: Progressing  Goal: Participates in PT with improved pain control throughout the shift  Outcome: Progressing  Goal: Free from opioid side effects throughout the shift  Outcome: Progressing  Goal: Free from acute confusion related to pain meds throughout the shift  Outcome: Progressing

## 2025-03-03 NOTE — PROGRESS NOTES
Esvin Gupta is a 64 y.o. male on day 7 of admission presenting with Acute renal failure, unspecified acute renal failure type (CMS-HCC).      Subjective   Doing well this AM, no complaints, when working with PT/OT went into Afib RVR     Objective     Last Recorded Vitals  /60 (BP Location: Right arm, Patient Position: Lying)   Pulse 64   Temp 36.1 °C (97 °F) (Temporal)   Resp 16   Wt 58.2 kg (128 lb 4.9 oz)   SpO2 97%     Physical Exam    GENERAL: NAD, alert & cooperative, chronically ill appearing  HEENT: normocephalic, atraumatic, sclera clear, MMM  CARDIAC: irregularly irregular, rate controlled (at time of exam) S1/S2  PULMONARY: clear  ABDOMEN: soft, non-distended erythema and excoriations on abd markedly improved, +ostomy  Ext: no c/c/e  NEURO:no appreciable acute focal deficits  PSYCH: appropriate mood & affect     Assessment/Plan     Septic/Hypovolemic Shock - resolved    Abdominal Cellulitis/Contact dermatitis   PAF with RVR today, on Eliquis  Acute on Chronic Anemia   DIMITRI - resolved  Hyponatremia - improved  Severe Protein Calorie Malnutrition   Chronic diastolic CHF (EF 60-65%, previously reduced 30-35% 12/2024), NICM  HTN, HLD  RA  Stage II colon cancer s/p R colectomy         Currently on Cefepime. Will finish a 7 day course of abx while hospitalized, Urine and blood cultures negative to date. Only identifiable source of sepsis at this time is the cellulitis on abd. Complete. Appreciate gen surg input re: ostomy care  Bps stable, did restart Entresto at lower dose 24-26mg (was on 97-103mg at home), continue Eliquis, today late morning when working with PT/OT went into Afib RVR, had already received lopressor 25mg about a hour prior, given lopressor 5mg IV and an additional of PO lopressor 25mg, will increase lopressor to 50mg BID, cardiology consulted, review of his chart shows he has been on many AV angie blocking agents at different doses since at times he has lower heart  rates  Tolerating PO intake, gets tube feeds overnight at the SNF. RD recommends to resume if eating < 75% of meals.   DIMITRI and hyperkalemia resolved, Na improved, repeat BMP today pending  PT/OT, discharge planning,        DVT Prophylaxis: Eliquis  Code Status: FULL CODE  Disposition: [facility]         Marvin Irving DO       Artificial pacemaker    Status post total hysterectomy  S/P hysterectomy

## 2025-03-03 NOTE — PROGRESS NOTES
Occupational Therapy    OT Treatment    Patient Name: Esvin Gupta  MRN: [id number]  Department: [facility]  Room: [facility]  Today's Date: 3/3/2025  Time Calculation  Start Time: 1015  Stop Time: 1042  Time Calculation (min): 27 min        Assessment:  End of Session Communication: Bedside nurse, PCT/NA/CTA  End of Session Patient Position: Up in chair, Alarm on     Plan:  Treatment Interventions: ADL retraining, Functional transfer training, UE strengthening/ROM, Compensatory technique education  OT Frequency: 3 times per week  OT Discharge Recommendations: Moderate intensity level of continued care  OT - OK to Discharge: Yes (to next level of care when cleared by medical team)  Treatment Interventions: ADL retraining, Functional transfer training, UE strengthening/ROM, Compensatory technique education    Subjective   Previous Visit Info:  OT Last Visit  OT Received On: 03/03/25  General:  General  Family/Caregiver Present:  (visitor at start of session but existed for therapy)  Co-Treatment: PT  Co-Treatment Reason: to maximize patient safety/abilities  Prior to Session Communication: Bedside nurse  Patient Position Received: Bed, 3 rail up, Alarm on  General Comment: pt. agreeable to therapy intervention. upon getting up and into chair HR elevated and nursing came to assess and BP was also elevated. nursing care completed and vitals looking better. pt was ok to stay up in chair  Precautions:  Medical Precautions: Fall precautions  Precautions Comment: tele; piv removed by RN start of tx; ileostomy        Vital Signs Comment: VSS throughout session, after transferring to chair pt's HR noted to be in 160s though getting conflicting readings.  BP taken: 194/91;  mmHg.  RN, PCNA, and eventually attending present in room; pt found to be in Afib with RVR - given metoprolol after which vitals returned to normal limits.  (other vitals remaining WNL through tx: SpO2 mid to upper 90s and resp rate ~20.)  RN  still present upon therapy departure.     Pain:  Pain Assessment  0-10 (Numeric) Pain Score: 0 - No pain    Objective         Activities of Daily Living: LE Dressing  LE Dressing: Yes  Pants Level of Assistance: Maximum assistance  Sock Level of Assistance: Maximum assistance  LE Dressing Where Assessed: Edge of bed  Functional Standing Tolerance:  Time: 1:00 standing at FWW  Bed Mobility/Transfers: Bed Mobility  Bed Mobility: Yes  Bed Mobility 1  Bed Mobility 1: Supine to sitting  Level of Assistance 1: Close supervision    Transfers  Transfer: Yes  Transfer 1  Technique 1: Sit to stand, Stand to sit  Transfer Device 1: Walker  Transfer Level of Assistance 1: Minimum assistance      Functional Mobility:  Functional Mobility  Functional Mobility Performed: Yes  Functional Mobility 1  Device 1: Rolling walker  Assistance 1: Minimum assistance (x2)  Comments 1: pt able to take a few steps from EOB over to chair with arms      Outcome Measures:[facility] Daily Activity  Putting on and taking off regular lower body clothing: A lot  Bathing (including washing, rinsing, drying): A lot  Putting on and taking off regular upper body clothing: A little  Toileting, which includes using toilet, bedpan or urinal: A lot  Taking care of personal grooming such as brushing teeth: A little  Eating Meals: A little  Daily Activity - Total Score: 15        Education Documentation  Precautions, taught by MAGUE Sloan at 3/3/2025 12:53 PM.  Learner: Patient  Readiness: Acceptance  Method: Explanation  Response: Verbalizes Understanding    ADL Training, taught by MAGUE Sloan at 3/3/2025 12:53 PM.  Learner: Patient  Readiness: Acceptance  Method: Explanation  Response: Verbalizes Understanding    Education Comments  No comments found.           Goals:  Encounter Problems       Encounter Problems (Active)       OT Goals       Increase functional mobility and  functional transfers to [employer] for bed/chair with dme prn    (Progressing)       Start:  02/27/25    Expected End:  03/13/25            increase bue ther ex/activity x 7-10 minutes and increase standing tolerance x 3-5 minutes with cga to promote greater activity tolerance for assist with adl.   (Progressing)       Start:  02/27/25    Expected End:  03/13/25            Increase ub/lb dressing to cga with dme prn  (Progressing)       Start:  02/27/25    Expected End:  03/13/25            pt. to apply ec/ws techniques with minimal cues to all mobility/transfer/adl to decrease fatigue/promote efficient use of energy toward completion of functional tasks.  (Progressing)       Start:  02/27/25    Expected End:  03/13/25

## 2025-03-03 NOTE — CONSULTS
Consults  History Of Present Illness:    Esvin Gupta is a 64 y.o. male presenting with septic shock.  He developed afib with RVR responding to IV amiodarone, Dig, increased beta blocker.  No chest pain, no dyspnea, no palpitations     Last Recorded Vitals:  Vitals:    03/03/25 0900 03/03/25 1000 03/03/25 1100 03/03/25 1200   BP:       BP Location:       Patient Position:       Pulse: 69 75 (!) 131    Resp: 25 16 19    Temp:    35.8 °C (96.4 °F)   TempSrc:    Temporal   SpO2:   100%    Weight:       Height:           Last Labs:  CBC - 3/3/2025: 11:10 AM  9.1 10.4 470    31.1      CMP - 3/3/2025: 11:10 AM  9.7 6.1 10 --- 0.7   2.5 3.1 16 138      PTT - No results in last year.  _   _ _     Troponin I   Date/Time Value Ref Range Status   06/06/2022 10:42 AM 11 0 - 20 ng/L Final     Comment:     .  Less than 99th percentile of normal range cutoff-  Female and children under 18 years old <14 ng/L; Male <21 ng/L: Negative  Repeat testing should be performed if clinically indicated.   .  Female and children under 18 years old 14-50 ng/L; Male 21-50 ng/L:  Consistent with possible cardiac damage and possible increased clinical   risk. Serial measurements may help to assess extent of myocardial damage.   .  >50 ng/L: Consistent with cardiac damage, increased clinical risk and  myocardial infarction. Serial measurements may help assess extent of   myocardial damage.   .   NOTE: Children less than 1 year old may have higher baseline troponin   levels and results should be interpreted in conjunction with the overall   clinical context.   .  NOTE: Troponin I testing is performed using a different   testing methodology at [facility] than at other   [facility] hospitals. Direct result comparisons should only   be made within the same method.     06/06/2022 09:50 AM 8 0 - 20 ng/L Final     Comment:     .  Less than 99th percentile of normal range cutoff-  Female and children under 18 years old <14 ng/L; Male <21  ng/L: Negative  Repeat testing should be performed if clinically indicated.   .  Female and children under 18 years old 14-50 ng/L; Male 21-50 ng/L:  Consistent with possible cardiac damage and possible increased clinical   risk. Serial measurements may help to assess extent of myocardial damage.   .  >50 ng/L: Consistent with cardiac damage, increased clinical risk and  myocardial infarction. Serial measurements may help assess extent of   myocardial damage.   .   NOTE: Children less than 1 year old may have higher baseline troponin   levels and results should be interpreted in conjunction with the overall   clinical context.   .  NOTE: Troponin I testing is performed using a different   testing methodology at [facility] than at other   [facility]. Direct result comparisons should only   be made within the same method.       BNP   Date/Time Value Ref Range Status   06/06/2022 09:50  (H) 0 - 99 pg/mL Final     Comment:     .  <100 pg/mL - Heart failure unlikely  100-299 pg/mL - Intermediate probability of acute heart  .               failure exacerbation. Correlate with clinical  .               context and patient history.    >=300 pg/mL - Heart Failure likely. Correlate with clinical  .               context and patient history.  BNP testing is performed using different testing   methodology at [facility] than at other   [facility]. Direct result comparisons should   only be made within the same method.       LDL Calculated   Date/Time Value Ref Range Status   10/16/2024 10:58 AM 97 <=99 mg/dL Final     Comment:                                 Near   Borderline      AGE      Desirable  Optimal    High     High     Very High     0-19 Y     0 - 109     ---    110-129   >/= 130     ----    20-24 Y     0 - 119     ---    120-159   >/= 160     ----      >24 Y     0 -  99   100-129  130-159   160-189     >/=190       VLDL   Date/Time Value Ref Range Status   10/16/2024 10:58  AM 18 0 - 40 mg/dL Final   06/09/2022 04:28 AM 16 0 - 40 mg/dL Final      Last I/O:  I/O last 3 completed shifts:  In: 1080 (18.6 mL/kg) [P.O.:1080]  Out: 3625 (62.3 mL/kg) [Urine:1100 (0.5 mL/kg/hr); Stool:2525]  Weight: 58.2 kg     Past Cardiology Tests (Last 3 Years):  EKG:  ECG 12 Lead 02/24/2025      ECG 12 lead 02/24/2025    Echo:  Transthoracic Echo (TTE) Complete 02/25/2025 LVEF = 60-65%, mildly dilated RV, unremarkable valves    Ejection Fractions:  EF   Date/Time Value Ref Range Status   02/25/2025 03:24 PM 63 %      Cath:  No results found for this or any previous visit from the past 1095 days.    Stress Test:  No results found for this or any previous visit from the past 1095 days.    Cardiac Imaging:  No results found for this or any previous visit from the past 1095 days.      Past Medical History:  He has a past medical history of Other forms of angina pectoris, Personal history of other diseases of the circulatory system, Personal history of other diseases of the musculoskeletal system and connective tissue, and Underweight (09/20/2022).    Past Surgical History:  He has a past surgical history that includes Other surgical history (08/02/2022) and Other surgical history (08/30/2022).      Social History:  He reports that he has never smoked. He quit smokeless tobacco use about 43 years ago.  His smokeless tobacco use included chew. He reports that he does not currently use alcohol. He reports that he does not use drugs.    Family History:  Family History   Problem Relation Name Age of Onset    Arthritis Mother      Pancreatic cancer Mother      Other (cardiac pacemaker) Father      Stroke Father      Diabetes Sibling          Allergies:  Patient has no known allergies.    Inpatient Medications:  Scheduled medications   Medication Dose Route Frequency    apixaban  5 mg oral BID    cefepime  1 g intravenous q8h    insulin lispro  0-5 Units subcutaneous TID AC    magnesium sulfate  2 g intravenous Once     metoprolol tartrate  50 mg oral BID    multivitamin with minerals  1 tablet oral Daily    nystatin  1 Application Topical BID    pantoprazole  40 mg intravenous Daily    [Held by provider] sacubitriL-valsartan  1 tablet oral BID    tamsulosin  0.4 mg oral Daily     PRN medications   Medication    acetaminophen    Or    acetaminophen    Or    acetaminophen    alteplase    ipratropium-albuteroL    metoprolol    morphine    ondansetron    Or    ondansetron    zinc oxide     Continuous Medications   Medication Dose Last Rate     Outpatient Medications:  Current Outpatient Medications   Medication Instructions    acetaminophen (Tylenol) 500 mg tablet 2 tablets, oral, Every 6 hours PRN    Eliquis 5 mg, oral, 2 times daily    folic acid (FOLVITE) 1 mg, oral, Daily    glucose (GLUTOSE-15) 15 g, buccal, Every 2 hour PRN    glucose 4 g, oral, Every 2 hour PRN    HumaLOG KwikPen Insulin 0-6 Units, subcutaneous, 2 times daily    ipratropium-albuteroL (Duo-Neb) 0.5-2.5 mg/3 mL nebulizer solution 3 mL, nebulization, 4 times daily RT    ipratropium-albuteroL (Duo-Neb) 0.5-2.5 mg/3 mL nebulizer solution 3 mL, nebulization, 4 times daily PRN    lactose-reduced food/fiber (ISOSOURCE 1.5 ELAINE FTUB) 70 mL/hr, feeding tube, 2 times daily    magnesium oxide (MAG-OX) 400 mg, oral, 2 times daily    metoprolol succinate XL (TOPROL-XL) 100 mg, oral, Daily    metoprolol tartrate (LOPRESSOR) 25 mg, oral, 2 times daily    multivitamin tablet 1 tablet, oral, Daily    nitroglycerin (NITROSTAT) 0.4 mg, sublingual, Every 5 min PRN    pantoprazole (PROTONIX) 40 mg, oral, Daily before breakfast    sacubitriL-valsartan (Entresto)  mg tablet 1 tablet, oral, 2 times daily    sodium bicarbonate 1,300 mg, oral, 2 times daily    spironolactone (ALDACTONE) 25 mg, oral, Daily    tamsulosin (FLOMAX) 0.4 mg, oral, Daily    traMADol (ULTRAM) 50 mg, oral, Every 8 hours PRN    Vitamin B-1 (mononitrate) 100 mg, oral, Daily       Physical Exam:  GEN: Thin  63 yo wm sitting comfortably  HEENT: Atraumatic, normocephalic  COR: Irreg. Irreg.  LUNGS: Clear  EXT: Warm     Assessment/Plan   1.) Afib with RVR with H/O PAF  2.) Non ischemic dilated cardiomyopathy with previous moderate LV systolic dysfunction with recovered LV systolic function on 2/25/25 echocardiogram  3.) Sepsis  4.) HLD  5.) Recent colectomy  \REC:  1.) Agree with current plans  2.) Continucetele monitoring       Thank you for the consultation                            Dr. Ramicone to take over 3/4/25                                               J:S  Peripheral IV 02/28/25 20 G Left Forearm (Active)   Site Assessment Clean;Dry;Intact 03/03/25 0722   Dressing Status Clean;Dry;Occlusive 03/03/25 0722   Number of days: 3       Code Status:  Full Code    I spent 30 minutes in the professional and overall care of this patient.        Nixon Oseguera MD

## 2025-03-03 NOTE — PROGRESS NOTES
Esvin Gupta is a 64 y.o. male on day 7 of admission presenting with Acute renal failure, unspecified acute renal failure type (CMS-HCC).  Record reviewed for updates.  Patient had episode of AF w/RVR this morning, while working with PT/OT, and received Lopressor.  [facility] reviewed for updates.  [facility] and [facility] are able to accept.  [facility] is interested and reviewing.  Attending provider updated regarding SNF availability.  This TCC met with patient at bedside and provided update.  Patient deferred to his brother, Juan.  This TCC phoned and spoke with patient's brother, Juan, and provided updates.  Brother will discuss with patient and his other bother, and provide choice.  Care Transitions will continue to follow.

## 2025-03-03 NOTE — PROGRESS NOTES
Esvin Gupta is a 64 y.o. male on day 7 of admission presenting with Acute renal failure, unspecified acute renal failure type (CMS-HCC).      Subjective   Doing well this AM, no complaints, when working with PT/OT went into Afib RVR     Objective     Last Recorded Vitals  /59   Pulse (!) 131   Temp 35.8 °C (96.4 °F) (Temporal)   Resp 19   Wt 58.2 kg (128 lb 4.9 oz)   SpO2 100%     Physical Exam    GENERAL: NAD, alert & cooperative, chronically ill appearing  HEENT: normocephalic, atraumatic, sclera clear, MMM  CARDIAC: irregularly irregular, rate controlled (at time of exam) S1/S2  PULMONARY: clear  ABDOMEN: soft, non-distended erythema and excoriations on abd markedly improved, +ostomy  Ext: no c/c/e  NEURO:no appreciable acute focal deficits  PSYCH: appropriate mood & affect     Assessment/Plan     Septic/Hypovolemic Shock - resolved    Abdominal Cellulitis/Contact dermatitis   PAF with RVR today, on Eliquis  Acute on Chronic Anemia   DIMITRI - resolved  Hyponatremia - improved  Severe Protein Calorie Malnutrition   Chronic diastolic CHF (EF 60-65%, previously reduced 30-35% 12/2024), NICM  HTN, HLD  RA  Stage II colon cancer s/p R colectomy         Currently on Cefepime. Will finish a 7 day course of abx while hospitalized, Urine and blood cultures negative to date. Only identifiable source of sepsis at this time is the cellulitis on abd. Complete. Appreciate gen surg input re: ostomy care  Bps stable, did restart Entresto at lower dose 24-26mg (was on 97-103mg at home), continue Eliquis, today late morning when working with PT/OT went into Afib RVR, had already received lopressor 25mg about a hour prior, given lopressor 5mg IV and an additional of PO lopressor 25mg, will increase lopressor to 50mg BID, cardiology consulted, review of his chart shows he has been on many AV angie blocking agents at different doses since at times he has lower heart rates and hypotension  Tolerating PO intake, gets  tube feeds overnight at the SNF. RD recommends to resume if eating < 75% of meals.   DIMITRI and hyperkalemia resolved, Na improved, repeat BMP today pending  PT/OT, discharge planning,        DVT Prophylaxis: Eliquis  Code Status: FULL CODE  Disposition: [facility]         Marvin Irving DO

## 2025-03-04 LAB
ANION GAP SERPL CALC-SCNC: 14 MMOL/L (ref 10–20)
BUN SERPL-MCNC: 22 MG/DL (ref 6–23)
CALCIUM SERPL-MCNC: 9 MG/DL (ref 8.6–10.3)
CHLORIDE SERPL-SCNC: 108 MMOL/L (ref 98–107)
CO2 SERPL-SCNC: 17 MMOL/L (ref 21–32)
CREAT SERPL-MCNC: 0.83 MG/DL (ref 0.5–1.3)
EGFRCR SERPLBLD CKD-EPI 2021: >90 ML/MIN/1.73M*2
ERYTHROCYTE [DISTWIDTH] IN BLOOD BY AUTOMATED COUNT: 15.9 % (ref 11.5–14.5)
GLUCOSE BLD MANUAL STRIP-MCNC: 105 MG/DL (ref 74–99)
GLUCOSE BLD MANUAL STRIP-MCNC: 90 MG/DL (ref 74–99)
GLUCOSE BLD MANUAL STRIP-MCNC: 96 MG/DL (ref 74–99)
GLUCOSE SERPL-MCNC: 81 MG/DL (ref 74–99)
HCT VFR BLD AUTO: 25.7 % (ref 41–52)
HGB BLD-MCNC: 8.6 G/DL (ref 13.5–17.5)
MAGNESIUM SERPL-MCNC: 1.87 MG/DL (ref 1.6–2.4)
MCH RBC QN AUTO: 28 PG (ref 26–34)
MCHC RBC AUTO-ENTMCNC: 33.5 G/DL (ref 32–36)
MCV RBC AUTO: 84 FL (ref 80–100)
NRBC BLD-RTO: 0 /100 WBCS (ref 0–0)
PLATELET # BLD AUTO: 409 X10*3/UL (ref 150–450)
POTASSIUM SERPL-SCNC: 4.2 MMOL/L (ref 3.5–5.3)
RBC # BLD AUTO: 3.07 X10*6/UL (ref 4.5–5.9)
SODIUM SERPL-SCNC: 135 MMOL/L (ref 136–145)
WBC # BLD AUTO: 9.1 X10*3/UL (ref 4.4–11.3)

## 2025-03-04 PROCEDURE — 2500000001 HC RX 250 WO HCPCS SELF ADMINISTERED DRUGS (ALT 637 FOR MEDICARE OP)

## 2025-03-04 PROCEDURE — 1200000002 HC GENERAL ROOM WITH TELEMETRY DAILY

## 2025-03-04 PROCEDURE — 2500000004 HC RX 250 GENERAL PHARMACY W/ HCPCS (ALT 636 FOR OP/ED): Performed by: INTERNAL MEDICINE

## 2025-03-04 PROCEDURE — 80048 BASIC METABOLIC PNL TOTAL CA: CPT | Performed by: STUDENT IN AN ORGANIZED HEALTH CARE EDUCATION/TRAINING PROGRAM

## 2025-03-04 PROCEDURE — 2500000004 HC RX 250 GENERAL PHARMACY W/ HCPCS (ALT 636 FOR OP/ED)

## 2025-03-04 PROCEDURE — 83735 ASSAY OF MAGNESIUM: CPT | Performed by: STUDENT IN AN ORGANIZED HEALTH CARE EDUCATION/TRAINING PROGRAM

## 2025-03-04 PROCEDURE — 85027 COMPLETE CBC AUTOMATED: CPT | Performed by: STUDENT IN AN ORGANIZED HEALTH CARE EDUCATION/TRAINING PROGRAM

## 2025-03-04 PROCEDURE — 2500000001 HC RX 250 WO HCPCS SELF ADMINISTERED DRUGS (ALT 637 FOR MEDICARE OP): Performed by: INTERNAL MEDICINE

## 2025-03-04 PROCEDURE — 36415 COLL VENOUS BLD VENIPUNCTURE: CPT | Performed by: STUDENT IN AN ORGANIZED HEALTH CARE EDUCATION/TRAINING PROGRAM

## 2025-03-04 PROCEDURE — 2500000002 HC RX 250 W HCPCS SELF ADMINISTERED DRUGS (ALT 637 FOR MEDICARE OP, ALT 636 FOR OP/ED)

## 2025-03-04 PROCEDURE — 99233 SBSQ HOSP IP/OBS HIGH 50: CPT | Performed by: INTERNAL MEDICINE

## 2025-03-04 PROCEDURE — 82947 ASSAY GLUCOSE BLOOD QUANT: CPT

## 2025-03-04 RX ORDER — SODIUM BICARBONATE 650 MG/1
1300 TABLET ORAL 2 TIMES DAILY
Status: DISCONTINUED | OUTPATIENT
Start: 2025-03-04 | End: 2025-03-06 | Stop reason: HOSPADM

## 2025-03-04 RX ADMIN — SODIUM BICARBONATE 650 MG TABLET 1300 MG: at 10:45

## 2025-03-04 RX ADMIN — ACETAMINOPHEN 650 MG: 325 TABLET, FILM COATED ORAL at 09:25

## 2025-03-04 RX ADMIN — ACETAMINOPHEN 650 MG: 325 TABLET, FILM COATED ORAL at 21:35

## 2025-03-04 RX ADMIN — APIXABAN 5 MG: 5 TABLET, FILM COATED ORAL at 21:33

## 2025-03-04 RX ADMIN — PANTOPRAZOLE SODIUM 40 MG: 40 INJECTION, POWDER, FOR SOLUTION INTRAVENOUS at 09:21

## 2025-03-04 RX ADMIN — NYSTATIN 1 APPLICATION: 100000 POWDER TOPICAL at 21:36

## 2025-03-04 RX ADMIN — Medication 1 TABLET: at 09:21

## 2025-03-04 RX ADMIN — SODIUM BICARBONATE 650 MG TABLET 1300 MG: at 21:33

## 2025-03-04 RX ADMIN — METOPROLOL TARTRATE 50 MG: 50 TABLET, FILM COATED ORAL at 21:33

## 2025-03-04 RX ADMIN — TAMSULOSIN HYDROCHLORIDE 0.4 MG: 0.4 CAPSULE ORAL at 09:21

## 2025-03-04 RX ADMIN — NYSTATIN 1 APPLICATION: 100000 POWDER TOPICAL at 09:22

## 2025-03-04 RX ADMIN — APIXABAN 5 MG: 5 TABLET, FILM COATED ORAL at 09:21

## 2025-03-04 RX ADMIN — CEFEPIME 1 G: 1 INJECTION, POWDER, FOR SOLUTION INTRAMUSCULAR; INTRAVENOUS at 01:30

## 2025-03-04 ASSESSMENT — COGNITIVE AND FUNCTIONAL STATUS - GENERAL
DAILY ACTIVITIY SCORE: 17
MOVING FROM LYING ON BACK TO SITTING ON SIDE OF FLAT BED WITH BEDRAILS: A LITTLE
MOVING FROM LYING ON BACK TO SITTING ON SIDE OF FLAT BED WITH BEDRAILS: A LITTLE
WALKING IN HOSPITAL ROOM: A LOT
TURNING FROM BACK TO SIDE WHILE IN FLAT BAD: A LITTLE
PERSONAL GROOMING: A LOT
CLIMB 3 TO 5 STEPS WITH RAILING: A LOT
HELP NEEDED FOR BATHING: A LITTLE
PERSONAL GROOMING: A LOT
STANDING UP FROM CHAIR USING ARMS: A LITTLE
DRESSING REGULAR LOWER BODY CLOTHING: A LITTLE
STANDING UP FROM CHAIR USING ARMS: A LITTLE
MOBILITY SCORE: 16
MOVING TO AND FROM BED TO CHAIR: A LITTLE
MOBILITY SCORE: 16
CLIMB 3 TO 5 STEPS WITH RAILING: A LOT
TOILETING: A LOT
TURNING FROM BACK TO SIDE WHILE IN FLAT BAD: A LITTLE
DRESSING REGULAR UPPER BODY CLOTHING: A LITTLE
TOILETING: A LOT
DAILY ACTIVITIY SCORE: 17
HELP NEEDED FOR BATHING: A LITTLE
WALKING IN HOSPITAL ROOM: A LOT
MOVING TO AND FROM BED TO CHAIR: A LITTLE
DRESSING REGULAR LOWER BODY CLOTHING: A LITTLE
DRESSING REGULAR UPPER BODY CLOTHING: A LITTLE

## 2025-03-04 ASSESSMENT — PAIN - FUNCTIONAL ASSESSMENT
PAIN_FUNCTIONAL_ASSESSMENT: 0-10

## 2025-03-04 ASSESSMENT — PAIN SCALES - GENERAL
PAINLEVEL_OUTOF10: 3
PAINLEVEL_OUTOF10: 0 - NO PAIN
PAINLEVEL_OUTOF10: 0 - NO PAIN
PAINLEVEL_OUTOF10: 5 - MODERATE PAIN

## 2025-03-04 ASSESSMENT — PAIN DESCRIPTION - LOCATION
LOCATION: ABDOMEN
LOCATION: ABDOMEN

## 2025-03-04 ASSESSMENT — PAIN DESCRIPTION - ORIENTATION: ORIENTATION: RIGHT

## 2025-03-04 NOTE — PROGRESS NOTES
NEPHROLOGY PROGRESS NOTE    REASON FOR CONSULT: DIMITRI and hyperkalemia    SUBJECTIVE:  Patient developed A-fib with RVR yesterday he felt dizzy he got normal saline bolus.  Today his bicarb level is low.  Blood pressure is stable.  He feels okay when he is resting he has been transferred out of the heart center.  Evaluated by cardiology.    OBJECTIVE:    Visit Vitals  /87 (BP Location: Right arm, Patient Position: Lying)   Pulse 69   Temp 37.2 °C (99 °F) (Temporal)   Resp 16          Intake/Output Summary (Last 24 hours) at 3/4/2025 1003  Last data filed at 3/4/2025 0455  Gross per 24 hour   Intake 1410 ml   Output 1950 ml   Net -540 ml        General: Awake and Alert, In no distress, Cooperative  HEENT: Oral mucosa moist, EOMI  NECK: Supple  CHEST: No crackles, no wheeze, no tachypnea  CVS: S1,S2 heard, no rubs, irregularly irregular  ABD: Soft, Non Tender, BS present, ostomy noted  EXT: no edema    MEDICATION:    Scheduled medications  apixaban, 5 mg, oral, BID  insulin lispro, 0-5 Units, subcutaneous, TID AC  [Transfer Hold] metoprolol tartrate, 50 mg, oral, BID  multivitamin with minerals, 1 tablet, oral, Daily  nystatin, 1 Application, Topical, BID  pantoprazole, 40 mg, intravenous, Daily  [Held by provider] sacubitriL-valsartan, 1 tablet, oral, BID  sodium bicarbonate, 1,300 mg, oral, BID  tamsulosin, 0.4 mg, oral, Daily      Continuous medications       PRN medications  PRN medications: acetaminophen **OR** acetaminophen **OR** acetaminophen, alteplase, ipratropium-albuteroL, [Transfer Hold] metoprolol, morphine, ondansetron **OR** ondansetron, zinc oxide     RESULTS:    Lab Results   Component Value Date    WBC 9.1 03/04/2025    HGB 8.6 (L) 03/04/2025    HCT 25.7 (L) 03/04/2025    MCV 84 03/04/2025     03/04/2025        Lab Results   Component Value Date    CREATININE 0.83 03/04/2025    BUN 22 03/04/2025     (L) 03/04/2025    K 4.2 03/04/2025     (H) 03/04/2025    CO2 17 (L) 03/04/2025         Radiology Imaging reviewed      ASSESSMENT/PLAN:     1.  DIMITRI: Renal function is normal.  Peak creatinine was 5.62.  He was on Entresto which had to be held due to hypotension and A-fib with RVR.  He received 1 L of normal saline bolus yesterday.  Creatinine still normal.  Off the Entresto will defer to cardiology as to when that can be resumed.    2.    Hyponatremia: Serum sodium has improved to 135.  Initial serum sodium was 118.  Encourage good nutrition.  On 1500 mL fluid restriction.  He received normal saline bolus.    3.  Anemia: S/p transfusion.  Hemoglobin currently at 9.3 .    4.  Acidosis: Non-anion gap.  Likely due to the normal saline infusion.  We will initiate him on oral sodium bicarbonate as he does have ostomy output.    Thank You very much for allowing me to participate in the care of this Patient    This document was created using dragon dictation and may contain unintended error    Bunny London MD   03/04/25

## 2025-03-04 NOTE — CARE PLAN
Problem: Pain - Adult  Goal: Verbalizes/displays adequate comfort level or baseline comfort level  Outcome: Progressing     Problem: Safety - Adult  Goal: Free from fall injury  Outcome: Progressing     Problem: Discharge Planning  Goal: Discharge to home or other facility with appropriate resources  Outcome: Progressing     Problem: Chronic Conditions and Co-morbidities  Goal: Patient's chronic conditions and co-morbidity symptoms are monitored and maintained or improved  Outcome: Progressing     Problem: Nutrition  Goal: Nutrient intake appropriate for maintaining nutritional needs  Outcome: Progressing     Problem: Skin  Goal: Decreased wound size/increased tissue granulation at next dressing change  Outcome: Progressing  Flowsheets (Taken 3/4/2025 1256)  Decreased wound size/increased tissue granulation at next dressing change: Promote sleep for wound healing  Goal: Participates in plan/prevention/treatment measures  Outcome: Progressing  Flowsheets (Taken 3/4/2025 1256)  Participates in plan/prevention/treatment measures: Discuss with provider PT/OT consult  Goal: Prevent/manage excess moisture  Outcome: Progressing  Flowsheets (Taken 3/4/2025 1256)  Prevent/manage excess moisture: Cleanse incontinence/protect with barrier cream  Goal: Prevent/minimize sheer/friction injuries  Outcome: Progressing  Flowsheets (Taken 3/4/2025 1256)  Prevent/minimize sheer/friction injuries: Use pull sheet  Goal: Promote/optimize nutrition  Outcome: Progressing  Flowsheets (Taken 3/4/2025 1256)  Promote/optimize nutrition: Consume > 50% meals/supplements  Goal: Promote skin healing  Outcome: Progressing  Flowsheets (Taken 3/4/2025 1256)  Promote skin healing: Assess skin/pad under line(s)/device(s)     Problem: Fall/Injury  Goal: Not fall by end of shift  Outcome: Progressing  Goal: Be free from injury by end of the shift  Outcome: Progressing  Goal: Verbalize understanding of personal risk factors for fall in the  hospital  Outcome: Progressing  Goal: Verbalize understanding of risk factor reduction measures to prevent injury from fall in the home  Outcome: Progressing  Goal: Use assistive devices by end of the shift  Outcome: Progressing  Goal: Pace activities to prevent fatigue by end of the shift  Outcome: Progressing     Problem: Pain  Goal: Takes deep breaths with improved pain control throughout the shift  Outcome: Progressing  Goal: Turns in bed with improved pain control throughout the shift  Outcome: Progressing  Goal: Walks with improved pain control throughout the shift  Outcome: Progressing  Goal: Performs ADL's with improved pain control throughout shift  Outcome: Progressing  Goal: Participates in PT with improved pain control throughout the shift  Outcome: Progressing  Goal: Free from opioid side effects throughout the shift  Outcome: Progressing  Goal: Free from acute confusion related to pain meds throughout the shift  Outcome: Progressing   The patient's goals for the shift include      The clinical goals for the shift include Patient will remain safe this shift    Over the shift, the patient did not make progress toward the following goals. Barriers to progression include none. Recommendations to address these barriers include n/a.

## 2025-03-04 NOTE — CARE PLAN
Problem: Pain - Adult  Goal: Verbalizes/displays adequate comfort level or baseline comfort level  Outcome: Progressing     Problem: Safety - Adult  Goal: Free from fall injury  Outcome: Progressing     Problem: Discharge Planning  Goal: Discharge to home or other facility with appropriate resources  Outcome: Progressing     Problem: Chronic Conditions and Co-morbidities  Goal: Patient's chronic conditions and co-morbidity symptoms are monitored and maintained or improved  Outcome: Progressing     Problem: Nutrition  Goal: Nutrient intake appropriate for maintaining nutritional needs  Outcome: Progressing     Problem: Skin  Goal: Decreased wound size/increased tissue granulation at next dressing change  Outcome: Progressing  Flowsheets (Taken 3/3/2025 2942)  Decreased wound size/increased tissue granulation at next dressing change: Promote sleep for wound healing  Goal: Participates in plan/prevention/treatment measures  Outcome: Progressing  Goal: Prevent/manage excess moisture  Outcome: Progressing  Goal: Prevent/minimize sheer/friction injuries  Outcome: Progressing  Goal: Promote/optimize nutrition  Outcome: Progressing  Goal: Promote skin healing  Outcome: Progressing     Problem: Fall/Injury  Goal: Not fall by end of shift  Outcome: Progressing  Goal: Be free from injury by end of the shift  Outcome: Progressing  Goal: Verbalize understanding of personal risk factors for fall in the hospital  Outcome: Progressing  Goal: Verbalize understanding of risk factor reduction measures to prevent injury from fall in the home  Outcome: Progressing  Goal: Use assistive devices by end of the shift  Outcome: Progressing  Goal: Pace activities to prevent fatigue by end of the shift  Outcome: Progressing     Problem: Pain  Goal: Takes deep breaths with improved pain control throughout the shift  Outcome: Progressing  Goal: Turns in bed with improved pain control throughout the shift  Outcome: Progressing  Goal: Walks with  improved pain control throughout the shift  Outcome: Progressing  Goal: Performs ADL's with improved pain control throughout shift  Outcome: Progressing  Goal: Participates in PT with improved pain control throughout the shift  Outcome: Progressing  Goal: Free from opioid side effects throughout the shift  Outcome: Progressing  Goal: Free from acute confusion related to pain meds throughout the shift  Outcome: Progressing

## 2025-03-04 NOTE — PROGRESS NOTES
Esvin Gupta is a 64 y.o. male on day 8 of admission presenting with Acute renal failure, unspecified acute renal failure type (CMS-HCC).      Subjective   Still feels weak.  No symptoms of palpitations.       Objective     Last Recorded Vitals  /87 (BP Location: Right arm, Patient Position: Lying)   Pulse 69   Temp 37.2 °C (99 °F) (Temporal)   Resp 16   Wt 58.2 kg (128 lb 4.9 oz)   SpO2 97%   Intake/Output last 3 Shifts:    Intake/Output Summary (Last 24 hours) at 3/4/2025 1245  Last data filed at 3/4/2025 1000  Gross per 24 hour   Intake 1410 ml   Output 2150 ml   Net -740 ml       Admission Weight  Weight: 72.6 kg (160 lb) (02/24/25 1118)    Daily Weight  03/03/25 : 58.2 kg (128 lb 4.9 oz)    Image Results  ECG 12 Lead  Normal sinus rhythm with sinus arrhythmia  Low voltage QRS  Septal infarct , age undetermined  Abnormal ECG  No previous ECGs available  Confirmed by Luis Blair ([id number]) on 2/26/2025 1:06:05 PM  ECG 12 lead  Atrial fibrillation with rapid ventricular response  Indeterminate axis  Nonspecific ST abnormality  Abnormal ECG  Confirmed by Luis Blair ([id number]) on 2/26/2025 1:05:54 PM  Transthoracic Echo (TTE) [place] [address]            Tel [phone] and Fax [phone]    TRANSTHORACIC ECHOCARDIOGRAM REPORT       Patient Name:       ESVIN GUPTA Reading Physician:    [id number] Dipak Batista MD  Study Date:         2/25/2025           Ordering Provider:    [id number] LORENE SAENZ  MRN/PID:            [id number]            Fellow:  Accession#:         [id number]        Nurse:                Chavo Galloway RN  Date of Birth/Age:  1960 / 64     Sonographer:          Lianet arechiga RDCS  Gender assigned at  M                    Additional Staff:  Birth:  Height:             182.00 cm           Admit Date:           2/24/2025  Weight:             72.01 kg            Admission Status:     Inpatient -                                                                Routine  BSA / BMI:          1.92 m2 / 21.74     Encounter#:           [id number]                      kg/m2  Blood Pressure:     120/71 mmHg         Department Location:  [address]                                                                floor/ICU    Study Type:    TRANSTHORACIC ECHO (TTE) COMPLETE  Diagnosis/ICD: Unspecified diastolic (congestive) heart failure (CHF)-I50.30;                 Unspecified systolic (congestive) heart failure (CHF)-I50.20  Indication:    Chronic systolic heart failure, ACC/AHA stage C [I50.22                 (ICD-10-CM)];  CPT Code:      Echo Complete w Full [id number]    Patient History:  Pertinent History: PMH hypertension, pulmonary hypertension hyperlipidemia,                     paroxysmal A-fib on Eliquis, nonischemic cardiomyopathy, CHF,                     recent colectomy/ileostomy creation secondary to stage II                     colon cancer, RA, history of alcohol/tobacco use. Patient                     presented to the ED due to a rash and pain throughout                     colostomy site.    Study Detail: The following Echo studies were performed: 2D and M-Mode. Image                quality for this study is suboptimal. Technically challenging                study due to body habitus and poor acoustic windows. Definity used                as a contrast agent for endocardial border definition. Total                contrast used for this procedure was 2 mL via IV push. Unable to                obtain suprasternal notch view.       PHYSICIAN INTERPRETATION:  Left Ventricle: Left ventricular ejection fraction is normal, by visual estimate at 60-65%. There are no regional left ventricular wall motion abnormalities. The left  ventricular cavity size is normal. Left ventricular diastolic filling was not assessed.  Left Atrium: The left atrial size is normal.  Right Ventricle: The right ventricle is mildly enlarged. There is normal right ventricular global systolic function.  Right Atrium: The right atrium is normal in size.  Aortic Valve: The aortic valve was not well visualized. There is no evidence of aortic valve regurgitation.  Mitral Valve: The mitral valve is normal in structure. There is trace mitral valve regurgitation.  Tricuspid Valve: The tricuspid valve is structurally normal. There is trace tricuspid regurgitation.  Pulmonic Valve: The pulmonic valve is not well visualized. The pulmonic valve regurgitation was not well visualized.  Pericardium: There is no pericardial effusion noted.  Aorta: The aortic root is normal.  Systemic Veins: The inferior vena cava appears normal in size, with IVC inspiratory collapse greater than 50%.  In comparison to the previous echocardiogram(s): Compared with study dated 12/16/2024,.       CONCLUSIONS:   1. Left ventricular ejection fraction is normal, by visual estimate at 60-65%.   2. There is normal right ventricular global systolic function.   3. Mildly enlarged right ventricle.    QUANTITATIVE DATA SUMMARY:     LV SYSTOLIC FUNCTION:                       Normal Ranges:  EF-Visual:      63 %  LV EF Reported: 63 %       TRICUSPID VALVE/RVSP:         Normal Ranges:  IVC Diam:             0.77 cm       [id number] Dipak Batista MD  Electronically signed on 2/26/2025 at 5:15:52 AM       ** Final **      Physical Exam  Vitals reviewed.   Constitutional:       Appearance: Normal appearance.   HENT:      Head: Normocephalic and atraumatic.      Mouth/Throat:      Mouth: Mucous membranes are moist.   Eyes:      Conjunctiva/sclera: Conjunctivae normal.   Cardiovascular:      Rate and Rhythm: Normal rate.      Pulses: Normal pulses.   Pulmonary:      Effort: Pulmonary effort is normal.      Breath  sounds: Normal breath sounds. No wheezing.   Abdominal:      General: Abdomen is flat. There is no distension.      Palpations: Abdomen is soft.      Tenderness: There is no guarding.      Comments: Ostomy in place, abd with recently added powder for moisture barrier   Musculoskeletal:         General: No swelling. Normal range of motion.   Skin:     General: Skin is warm and dry.   Neurological:      General: No focal deficit present.      Mental Status: He is alert. Mental status is at baseline.   Psychiatric:         Mood and Affect: Mood normal.         Behavior: Behavior normal.         Relevant Results               Assessment/Plan   This patient currently has cardiac telemetry ordered; if you would like to modify or discontinue the telemetry order, click here to go to the orders activity to modify/discontinue the order.              Assessment & Plan      Expand All Collapse All    Esvinbritt Gupta is a 64 y.o. male on day 7 of admission presenting with Acute renal failure, unspecified acute renal failure type (CMS-HCC).           Subjective  Doing well this AM, no complaints, when working with PT/OT went into Afib RVR           Objective  Last Recorded Vitals  /59   Pulse (!) 131   Temp 35.8 °C (96.4 °F) (Temporal)   Resp 19   Wt 58.2 kg (128 lb 4.9 oz)   SpO2 100%      Physical Exam     GENERAL: NAD, alert & cooperative, chronically ill appearing  HEENT: normocephalic, atraumatic, sclera clear, MMM  CARDIAC: irregularly irregular, rate controlled (at time of exam) S1/S2  PULMONARY: clear  ABDOMEN: soft, non-distended erythema and excoriations on abd markedly improved, +ostomy  Ext: no c/c/e  NEURO:no appreciable acute focal deficits  PSYCH: appropriate mood & affect        Assessment/Plan  Septic/Hypovolemic Shock - resolved     Abdominal Cellulitis/Contact dermatitis   PAF with RVR, on Eliquis  Metabolic acidosis  Acute on Chronic Anemia   DIMITRI - resolved  Hyponatremia - improving  Severe  Protein Calorie Malnutrition   Chronic diastolic CHF (EF 60-65%, previously reduced 30-35% 12/2024), NICM  HTN, HLD  RA  Stage II colon cancer s/p R colectomy         Cefepime course completed for abd wall cellulitis.  Appreciate gen surg input re: ostomy care  Bicarb tabs initiated by nephrology for metabolic acidosis - likely from NS  Na improving, continue fluid restriction  Entresto held given recent hypotension with RVR, Cardiology consult for mgmt of afib rvr/ hypotension given hard to control rate  Tolerating PO intake, gets tube feeds overnight at the SNF. RD recommends to resume if eating < 75% of meals.   PT/OT, discharge planning,        DVT Prophylaxis: Eliquis  Code Status: FULL CODE  Disposition: [facility]             Ciaran Marin MD

## 2025-03-04 NOTE — PROGRESS NOTES
Met with pt's brother Juan and preference is [facility], asked this facility to start precert.   Millicent Hunter RN TCC

## 2025-03-05 LAB
ALBUMIN SERPL BCP-MCNC: 3.5 G/DL (ref 3.4–5)
ANION GAP SERPL CALC-SCNC: 12 MMOL/L (ref 10–20)
BUN SERPL-MCNC: 23 MG/DL (ref 6–23)
CALCIUM SERPL-MCNC: 9.2 MG/DL (ref 8.6–10.3)
CHLORIDE SERPL-SCNC: 107 MMOL/L (ref 98–107)
CO2 SERPL-SCNC: 18 MMOL/L (ref 21–32)
CREAT SERPL-MCNC: 0.72 MG/DL (ref 0.5–1.3)
EGFRCR SERPLBLD CKD-EPI 2021: >90 ML/MIN/1.73M*2
ERYTHROCYTE [DISTWIDTH] IN BLOOD BY AUTOMATED COUNT: 16 % (ref 11.5–14.5)
GLUCOSE BLD MANUAL STRIP-MCNC: 109 MG/DL (ref 74–99)
GLUCOSE BLD MANUAL STRIP-MCNC: 121 MG/DL (ref 74–99)
GLUCOSE BLD MANUAL STRIP-MCNC: 79 MG/DL (ref 74–99)
GLUCOSE BLD MANUAL STRIP-MCNC: 86 MG/DL (ref 74–99)
GLUCOSE SERPL-MCNC: 77 MG/DL (ref 74–99)
HCT VFR BLD AUTO: 24.7 % (ref 41–52)
HGB BLD-MCNC: 8.4 G/DL (ref 13.5–17.5)
MCH RBC QN AUTO: 28.3 PG (ref 26–34)
MCHC RBC AUTO-ENTMCNC: 34 G/DL (ref 32–36)
MCV RBC AUTO: 83 FL (ref 80–100)
NRBC BLD-RTO: 0 /100 WBCS (ref 0–0)
PHOSPHATE SERPL-MCNC: 4 MG/DL (ref 2.5–4.9)
PLATELET # BLD AUTO: 358 X10*3/UL (ref 150–450)
POTASSIUM SERPL-SCNC: 4 MMOL/L (ref 3.5–5.3)
RBC # BLD AUTO: 2.97 X10*6/UL (ref 4.5–5.9)
SODIUM SERPL-SCNC: 133 MMOL/L (ref 136–145)
WBC # BLD AUTO: 7.6 X10*3/UL (ref 4.4–11.3)

## 2025-03-05 PROCEDURE — 2500000001 HC RX 250 WO HCPCS SELF ADMINISTERED DRUGS (ALT 637 FOR MEDICARE OP)

## 2025-03-05 PROCEDURE — 2500000004 HC RX 250 GENERAL PHARMACY W/ HCPCS (ALT 636 FOR OP/ED): Performed by: INTERNAL MEDICINE

## 2025-03-05 PROCEDURE — 1200000002 HC GENERAL ROOM WITH TELEMETRY DAILY

## 2025-03-05 PROCEDURE — 84100 ASSAY OF PHOSPHORUS: CPT | Performed by: INTERNAL MEDICINE

## 2025-03-05 PROCEDURE — 2500000001 HC RX 250 WO HCPCS SELF ADMINISTERED DRUGS (ALT 637 FOR MEDICARE OP): Performed by: INTERNAL MEDICINE

## 2025-03-05 PROCEDURE — 36415 COLL VENOUS BLD VENIPUNCTURE: CPT | Performed by: INTERNAL MEDICINE

## 2025-03-05 PROCEDURE — 82947 ASSAY GLUCOSE BLOOD QUANT: CPT

## 2025-03-05 PROCEDURE — 99232 SBSQ HOSP IP/OBS MODERATE 35: CPT | Performed by: INTERNAL MEDICINE

## 2025-03-05 PROCEDURE — 2500000002 HC RX 250 W HCPCS SELF ADMINISTERED DRUGS (ALT 637 FOR MEDICARE OP, ALT 636 FOR OP/ED): Performed by: INTERNAL MEDICINE

## 2025-03-05 PROCEDURE — 85027 COMPLETE CBC AUTOMATED: CPT | Performed by: INTERNAL MEDICINE

## 2025-03-05 RX ADMIN — APIXABAN 5 MG: 5 TABLET, FILM COATED ORAL at 09:45

## 2025-03-05 RX ADMIN — ACETAMINOPHEN 650 MG: 325 TABLET, FILM COATED ORAL at 21:10

## 2025-03-05 RX ADMIN — SODIUM BICARBONATE 650 MG TABLET 1300 MG: at 09:45

## 2025-03-05 RX ADMIN — METOPROLOL TARTRATE 50 MG: 50 TABLET, FILM COATED ORAL at 21:05

## 2025-03-05 RX ADMIN — APIXABAN 5 MG: 5 TABLET, FILM COATED ORAL at 21:05

## 2025-03-05 RX ADMIN — NYSTATIN 1 APPLICATION: 100000 POWDER TOPICAL at 09:48

## 2025-03-05 RX ADMIN — SODIUM BICARBONATE 650 MG TABLET 1300 MG: at 21:05

## 2025-03-05 RX ADMIN — ACETAMINOPHEN 650 MG: 325 TABLET, FILM COATED ORAL at 09:50

## 2025-03-05 RX ADMIN — PANTOPRAZOLE SODIUM 40 MG: 40 INJECTION, POWDER, FOR SOLUTION INTRAVENOUS at 09:45

## 2025-03-05 RX ADMIN — Medication 1 TABLET: at 09:45

## 2025-03-05 RX ADMIN — NYSTATIN 1 APPLICATION: 100000 POWDER TOPICAL at 21:06

## 2025-03-05 RX ADMIN — METOPROLOL TARTRATE 50 MG: 50 TABLET, FILM COATED ORAL at 09:45

## 2025-03-05 RX ADMIN — TAMSULOSIN HYDROCHLORIDE 0.4 MG: 0.4 CAPSULE ORAL at 09:45

## 2025-03-05 ASSESSMENT — COGNITIVE AND FUNCTIONAL STATUS - GENERAL
TURNING FROM BACK TO SIDE WHILE IN FLAT BAD: A LITTLE
HELP NEEDED FOR BATHING: A LITTLE
CLIMB 3 TO 5 STEPS WITH RAILING: A LOT
MOBILITY SCORE: 16
DAILY ACTIVITIY SCORE: 17
MOVING TO AND FROM BED TO CHAIR: A LITTLE
DRESSING REGULAR UPPER BODY CLOTHING: A LITTLE
WALKING IN HOSPITAL ROOM: A LOT
STANDING UP FROM CHAIR USING ARMS: A LITTLE
DRESSING REGULAR UPPER BODY CLOTHING: A LITTLE
HELP NEEDED FOR BATHING: A LITTLE
TURNING FROM BACK TO SIDE WHILE IN FLAT BAD: A LITTLE
MOBILITY SCORE: 16
TOILETING: A LOT
DRESSING REGULAR LOWER BODY CLOTHING: A LITTLE
WALKING IN HOSPITAL ROOM: A LOT
DAILY ACTIVITIY SCORE: 17
STANDING UP FROM CHAIR USING ARMS: A LITTLE
MOVING FROM LYING ON BACK TO SITTING ON SIDE OF FLAT BED WITH BEDRAILS: A LITTLE
MOVING TO AND FROM BED TO CHAIR: A LITTLE
PERSONAL GROOMING: A LOT
CLIMB 3 TO 5 STEPS WITH RAILING: A LOT
DRESSING REGULAR LOWER BODY CLOTHING: A LITTLE
TOILETING: A LOT
PERSONAL GROOMING: A LOT
MOVING FROM LYING ON BACK TO SITTING ON SIDE OF FLAT BED WITH BEDRAILS: A LITTLE

## 2025-03-05 ASSESSMENT — PAIN SCALES - GENERAL
PAINLEVEL_OUTOF10: 3
PAINLEVEL_OUTOF10: 0 - NO PAIN
PAINLEVEL_OUTOF10: 0 - NO PAIN
PAINLEVEL_OUTOF10: 6

## 2025-03-05 ASSESSMENT — PAIN DESCRIPTION - ORIENTATION: ORIENTATION: RIGHT

## 2025-03-05 ASSESSMENT — PAIN - FUNCTIONAL ASSESSMENT
PAIN_FUNCTIONAL_ASSESSMENT: 0-10

## 2025-03-05 ASSESSMENT — PAIN DESCRIPTION - LOCATION
LOCATION: ABDOMEN
LOCATION: ABDOMEN

## 2025-03-05 ASSESSMENT — PAIN SCALES - WONG BAKER: WONGBAKER_NUMERICALRESPONSE: HURTS EVEN MORE

## 2025-03-05 NOTE — PROGRESS NOTES
Esvin Gupta is a 64 y.o. male on day 9 of admission presenting with Acute renal failure, unspecified acute renal failure type (CMS-HCC).      Subjective   Still feels weak.         Objective     Last Recorded Vitals  /77   Pulse 65   Temp 36.8 °C (98.2 °F) (Temporal)   Resp 18   Wt 58.2 kg (128 lb 4.9 oz)   SpO2 98%   Intake/Output last 3 Shifts:    Intake/Output Summary (Last 24 hours) at 3/5/2025 1309  Last data filed at 3/5/2025 1000  Gross per 24 hour   Intake 440 ml   Output 1550 ml   Net -1110 ml       Admission Weight  Weight: 72.6 kg (160 lb) (02/24/25 1118)    Daily Weight  03/03/25 : 58.2 kg (128 lb 4.9 oz)    Image Results  ECG 12 Lead  Normal sinus rhythm with sinus arrhythmia  Low voltage QRS  Septal infarct , age undetermined  Abnormal ECG  No previous ECGs available  Confirmed by Luis Blair ([id number]) on 2/26/2025 1:06:05 PM  ECG 12 lead  Atrial fibrillation with rapid ventricular response  Indeterminate axis  Nonspecific ST abnormality  Abnormal ECG  Confirmed by Luis Blair ([id number]) on 2/26/2025 1:05:54 PM  Transthoracic Echo (TTE) [facility], [address] [place] [address]            Tel [phone] and Fax [phone]    TRANSTHORACIC ECHOCARDIOGRAM REPORT       Patient Name:       ESVIN GUPTA Reading Physician:    [id number] Dipak Batista MD  Study Date:         2/25/2025           Ordering Provider:    [id number] LORENE SAENZ  MRN/PID:            [id number]            Fellow:  Accession#:         [id number]        Nurse:                Chavo Galloway RN  Date of Birth/Age:  1960 / 64     Sonographer:          Lianet arechiga RDCS  Gender assigned at  M                   Additional Staff:  Birth:  Height:             182.00 cm           Admit Date:            2/24/2025  Weight:             72.01 kg            Admission Status:     Inpatient -                                                                Routine  BSA / BMI:          1.92 m2 / 21.74     Encounter#:           [id number]                      kg/m2  Blood Pressure:     120/71 mmHg         Department Location:  [address]                                                                floor/ICU    Study Type:    TRANSTHORACIC ECHO (TTE) COMPLETE  Diagnosis/ICD: Unspecified diastolic (congestive) heart failure (CHF)-I50.30;                 Unspecified systolic (congestive) heart failure (CHF)-I50.20  Indication:    Chronic systolic heart failure, ACC/AHA stage C [I50.22                 (ICD-10-CM)];  CPT Code:      Echo Complete w Full Doppler-[id number]    Patient History:  Pertinent History: PMH hypertension, pulmonary hypertension hyperlipidemia,                     paroxysmal A-fib on Eliquis, nonischemic cardiomyopathy, CHF,                     recent colectomy/ileostomy creation secondary to stage II                     colon cancer, RA, history of alcohol/tobacco use. Patient                     presented to the ED due to a rash and pain throughout                     colostomy site.    Study Detail: The following Echo studies were performed: 2D and M-Mode. Image                quality for this study is suboptimal. Technically challenging                study due to body habitus and poor acoustic windows. Definity used                as a contrast agent for endocardial border definition. Total                contrast used for this procedure was 2 mL via IV push. Unable to                obtain suprasternal notch view.       PHYSICIAN INTERPRETATION:  Left Ventricle: Left ventricular ejection fraction is normal, by visual estimate at 60-65%. There are no regional left ventricular wall motion abnormalities. The left ventricular cavity size is normal. Left ventricular diastolic filling was not  assessed.  Left Atrium: The left atrial size is normal.  Right Ventricle: The right ventricle is mildly enlarged. There is normal right ventricular global systolic function.  Right Atrium: The right atrium is normal in size.  Aortic Valve: The aortic valve was not well visualized. There is no evidence of aortic valve regurgitation.  Mitral Valve: The mitral valve is normal in structure. There is trace mitral valve regurgitation.  Tricuspid Valve: The tricuspid valve is structurally normal. There is trace tricuspid regurgitation.  Pulmonic Valve: The pulmonic valve is not well visualized. The pulmonic valve regurgitation was not well visualized.  Pericardium: There is no pericardial effusion noted.  Aorta: The aortic root is normal.  Systemic Veins: The inferior vena cava appears normal in size, with IVC inspiratory collapse greater than 50%.  In comparison to the previous echocardiogram(s): Compared with study dated 12/16/2024,.       CONCLUSIONS:   1. Left ventricular ejection fraction is normal, by visual estimate at 60-65%.   2. There is normal right ventricular global systolic function.   3. Mildly enlarged right ventricle.    QUANTITATIVE DATA SUMMARY:     LV SYSTOLIC FUNCTION:                       Normal Ranges:  EF-Visual:      63 %  LV EF Reported: 63 %       TRICUSPID VALVE/RVSP:         Normal Ranges:  IVC Diam:             0.77 cm       [id number] Dipak Batista MD  Electronically signed on 2/26/2025 at 5:15:52 AM       ** Final **      Physical Exam  Vitals reviewed.   Constitutional:       Appearance: Normal appearance.   HENT:      Head: Normocephalic and atraumatic.      Mouth/Throat:      Mouth: Mucous membranes are moist.   Eyes:      Conjunctiva/sclera: Conjunctivae normal.   Cardiovascular:      Rate and Rhythm: Normal rate.      Pulses: Normal pulses.   Pulmonary:      Effort: Pulmonary effort is normal.      Breath sounds: Normal breath sounds. No wheezing.   Abdominal:      General: Abdomen is  flat. There is no distension.      Palpations: Abdomen is soft.      Tenderness: There is no guarding.      Comments: Ostomy in place, abd with recently added powder for moisture barrier   Musculoskeletal:         General: No swelling. Normal range of motion.   Skin:     General: Skin is warm and dry.   Neurological:      General: No focal deficit present.      Mental Status: He is alert. Mental status is at baseline.   Psychiatric:         Mood and Affect: Mood normal.         Behavior: Behavior normal.         Relevant Results               Assessment/Plan   This patient currently has cardiac telemetry ordered; if you would like to modify or discontinue the telemetry order, click here to go to the orders activity to modify/discontinue the order.              Assessment & Plan           Assessment/Plan  Septic/Hypovolemic Shock - resolved     Abdominal Cellulitis/Contact dermatitis   PAF with RVR, on Eliquis  Metabolic acidosis  Acute on Chronic Anemia   DIMITRI - resolved  Hyponatremia - improving  Severe Protein Calorie Malnutrition   Chronic diastolic CHF (EF 60-65%, previously reduced 30-35% 12/2024), NICM  HTN, HLD  RA  Stage II colon cancer s/p R colectomy         Cefepime course completed for abd wall cellulitis.  Appreciate gen surg input re: ostomy care  Bicarb tabs initiated by nephrology for metabolic acidosis - likely from NS, mildly improved today  Na improving, continue fluid restriction  Continue holding entresto  given recent hypotension with RVR, Cardiology consult for mgmt of afib rvr/ hypotension given hard to control rate  Tolerating PO intake, gets tube feeds overnight at the [facility]. RD recommends to resume if eating < 75% of meals.   PT/OT, discharge planning.  Monitor vitals, trend bicarb.        DVT Prophylaxis: Eliquis  Code Status: FULL CODE  Disposition: [facility]             Ciaran Marin MD

## 2025-03-05 NOTE — PROGRESS NOTES
NEPHROLOGY PROGRESS NOTE    REASON FOR CONSULT: DIMITRI and hyperkalemia    SUBJECTIVE:  Patient feels weak.  His appetite is okay.  He denies any chest pain.  His heart rate is better and has no palpitations.  No shortness of breath.    OBJECTIVE:    Visit Vitals  /77   Pulse 65   Temp 36.8 °C (98.2 °F) (Temporal)   Resp 18          Intake/Output Summary (Last 24 hours) at 3/5/2025 0940  Last data filed at 3/5/2025 0644  Gross per 24 hour   Intake 240 ml   Output 1350 ml   Net -1110 ml        General: Awake and Alert, In no distress, Cooperative  HEENT: Oral mucosa moist, EOMI  NECK: Supple  CHEST: No crackles, no wheeze, no tachypnea  CVS: S1,S2 heard, no rubs, irregularly irregular but controlled  ABD: Soft, Non Tender, BS present, ostomy noted  EXT: no edema    MEDICATION:    Scheduled medications  apixaban, 5 mg, oral, BID  insulin lispro, 0-5 Units, subcutaneous, TID AC  metoprolol tartrate, 50 mg, oral, BID  multivitamin with minerals, 1 tablet, oral, Daily  nystatin, 1 Application, Topical, BID  pantoprazole, 40 mg, intravenous, Daily  [Held by provider] sacubitriL-valsartan, 1 tablet, oral, BID  sodium bicarbonate, 1,300 mg, oral, BID  tamsulosin, 0.4 mg, oral, Daily      Continuous medications       PRN medications  PRN medications: acetaminophen **OR** acetaminophen **OR** acetaminophen, alteplase, ipratropium-albuteroL, metoprolol, morphine, ondansetron **OR** ondansetron, zinc oxide     RESULTS:    Lab Results   Component Value Date    WBC 7.6 03/05/2025    HGB 8.4 (L) 03/05/2025    HCT 24.7 (L) 03/05/2025    MCV 83 03/05/2025     03/05/2025        Lab Results   Component Value Date    CREATININE 0.72 03/05/2025    BUN 23 03/05/2025     (L) 03/05/2025    K 4.0 03/05/2025     03/05/2025    CO2 18 (L) 03/05/2025        Radiology Imaging reviewed      ASSESSMENT/PLAN:     1.  DIMITRI: Renal function is normal.  Peak creatinine was 5.62.  His Entresto is on hold due to the labile blood  pressure.  Will defer to cardiology.  He has heart failure with low EF.     2.    Hyponatremia: Serum sodium at 133.  Initial serum sodium was 118.  Encourage good nutrition.  On 1500 mL fluid restriction.     3.  Anemia: S/p transfusion.  Hemoglobin currently at 8.4.  He is on apixaban.    4.  Acidosis: Non-anion gap.  He received IV normal saline and also has increased ostomy output.  We have him on sodium bicarbonate by mouth and there is some improvement.    Thank You very much for allowing me to participate in the care of this Patient    This document was created using dragon dictation and may contain unintended error    Bunny London MD   03/05/25

## 2025-03-05 NOTE — CARE PLAN
Problem: Pain - Adult  Goal: Verbalizes/displays adequate comfort level or baseline comfort level  Outcome: Progressing     Problem: Safety - Adult  Goal: Free from fall injury  Outcome: Progressing     Problem: Discharge Planning  Goal: Discharge to home or other facility with appropriate resources  Outcome: Progressing     Problem: Chronic Conditions and Co-morbidities  Goal: Patient's chronic conditions and co-morbidity symptoms are monitored and maintained or improved  Outcome: Progressing     Problem: Nutrition  Goal: Nutrient intake appropriate for maintaining nutritional needs  Outcome: Progressing     Problem: Skin  Goal: Decreased wound size/increased tissue granulation at next dressing change  Outcome: Progressing  Flowsheets (Taken 3/4/2025 2335)  Decreased wound size/increased tissue granulation at next dressing change:   Promote sleep for wound healing   Protective dressings over bony prominences  Goal: Participates in plan/prevention/treatment measures  Outcome: Progressing  Flowsheets (Taken 3/4/2025 2335)  Participates in plan/prevention/treatment measures: Elevate heels  Goal: Prevent/manage excess moisture  Outcome: Progressing  Flowsheets (Taken 3/4/2025 2335)  Prevent/manage excess moisture:   Moisturize dry skin   Cleanse incontinence/protect with barrier cream  Goal: Prevent/minimize sheer/friction injuries  Outcome: Progressing  Flowsheets (Taken 3/4/2025 2335)  Prevent/minimize sheer/friction injuries: Use pull sheet  Goal: Promote/optimize nutrition  Outcome: Progressing  Flowsheets (Taken 3/4/2025 2335)  Promote/optimize nutrition: Consume > 50% meals/supplements  Goal: Promote skin healing  Outcome: Progressing  Flowsheets (Taken 3/4/2025 2335)  Promote skin healing: Protective dressings over bony prominences     Problem: Fall/Injury  Goal: Not fall by end of shift  Outcome: Progressing  Goal: Be free from injury by end of the shift  Outcome: Progressing  Goal: Verbalize understanding of  personal risk factors for fall in the hospital  Outcome: Progressing  Goal: Verbalize understanding of risk factor reduction measures to prevent injury from fall in the home  Outcome: Progressing  Goal: Use assistive devices by end of the shift  Outcome: Progressing  Goal: Pace activities to prevent fatigue by end of the shift  Outcome: Progressing     Problem: Pain  Goal: Takes deep breaths with improved pain control throughout the shift  Outcome: Progressing  Goal: Turns in bed with improved pain control throughout the shift  Outcome: Progressing  Goal: Walks with improved pain control throughout the shift  Outcome: Progressing  Goal: Performs ADL's with improved pain control throughout shift  Outcome: Progressing  Goal: Participates in PT with improved pain control throughout the shift  Outcome: Progressing  Goal: Free from opioid side effects throughout the shift  Outcome: Progressing  Goal: Free from acute confusion related to pain meds throughout the shift  Outcome: Progressing   The patient's goals for the shift include      The clinical goals for the shift include Maintain safety and comfort

## 2025-03-05 NOTE — CARE PLAN
Problem: Pain - Adult  Goal: Verbalizes/displays adequate comfort level or baseline comfort level  Outcome: Progressing     Problem: Safety - Adult  Goal: Free from fall injury  Outcome: Progressing     Problem: Discharge Planning  Goal: Discharge to home or other facility with appropriate resources  Outcome: Progressing     Problem: Chronic Conditions and Co-morbidities  Goal: Patient's chronic conditions and co-morbidity symptoms are monitored and maintained or improved  Outcome: Progressing     Problem: Nutrition  Goal: Nutrient intake appropriate for maintaining nutritional needs  Outcome: Progressing     Problem: Skin  Goal: Decreased wound size/increased tissue granulation at next dressing change  Outcome: Progressing  Flowsheets (Taken 3/5/2025 1038)  Decreased wound size/increased tissue granulation at next dressing change: Promote sleep for wound healing  Goal: Participates in plan/prevention/treatment measures  Outcome: Progressing  Flowsheets (Taken 3/5/2025 1038)  Participates in plan/prevention/treatment measures: Elevate heels  Goal: Prevent/manage excess moisture  Outcome: Progressing  Flowsheets (Taken 3/5/2025 1038)  Prevent/manage excess moisture: Moisturize dry skin  Goal: Prevent/minimize sheer/friction injuries  Outcome: Progressing  Flowsheets (Taken 3/5/2025 1038)  Prevent/minimize sheer/friction injuries: Use pull sheet  Goal: Promote/optimize nutrition  Outcome: Progressing  Flowsheets (Taken 3/5/2025 1038)  Promote/optimize nutrition: Consume > 50% meals/supplements  Goal: Promote skin healing  Outcome: Progressing  Flowsheets (Taken 3/5/2025 1038)  Promote skin healing: Assess skin/pad under line(s)/device(s)     Problem: Fall/Injury  Goal: Not fall by end of shift  Outcome: Progressing  Goal: Be free from injury by end of the shift  Outcome: Progressing  Goal: Verbalize understanding of personal risk factors for fall in the hospital  Outcome: Progressing  Goal: Verbalize understanding  of risk factor reduction measures to prevent injury from fall in the home  Outcome: Progressing  Goal: Use assistive devices by end of the shift  Outcome: Progressing  Goal: Pace activities to prevent fatigue by end of the shift  Outcome: Progressing     Problem: Pain  Goal: Takes deep breaths with improved pain control throughout the shift  Outcome: Progressing  Goal: Turns in bed with improved pain control throughout the shift  Outcome: Progressing  Goal: Walks with improved pain control throughout the shift  Outcome: Progressing  Goal: Performs ADL's with improved pain control throughout shift  Outcome: Progressing  Goal: Participates in PT with improved pain control throughout the shift  Outcome: Progressing  Goal: Free from opioid side effects throughout the shift  Outcome: Progressing  Goal: Free from acute confusion related to pain meds throughout the shift  Outcome: Progressing   The patient's goals for the shift include      The clinical goals for the shift include Maintain safety and comfort    Over the shift, the patient did not make progress toward the following goals. Barriers to progression include none. Recommendations to address these barriers include n/a.

## 2025-03-05 NOTE — PROGRESS NOTES
"Nutrition Follow Up Assessment:   Nutrition Assessment         Patient is a 64 y.o. male.       Nutrition History:  Energy Intake: Good > 75 %  Pain affecting nutrition status: N/A  Food and Nutrient History: Pt reports good appetite and intake. Documented meal intakes in EMR have been %. Drinking nepro shakes. Denies N/V/D/C. Does not have any nutrition concerns.       Anthropometrics:  Height: 182.9 cm (6' 0.01\")   Weight: 58.2 kg (128 lb 4.9 oz)   BMI (Calculated): 17.4  IBW/kg (Dietitian Calculated): 80.9 kg  Percent of IBW: 90 %       Weight History:     Weight Change %:  Weight History / % Weight Change: New wt 58.2 kg on 3/3/25 - stable x 3 days but significantly less than admission wt. Has had ~7L net output this admission, per I&O summary.    Nutrition Focused Physical Exam Findings:    Subcutaneous Fat Loss:   Defer Subcutaneous Fat Loss Assessment: Defer all  Defer All Reason: completed 2/25/25  Muscle Wasting:  Defer Muscle Wasting Assessment: Defer all  Defer All Reason: completed 2/25/25  Edema:  Edema: none  Physical Findings:  Skin: Positive (back wound per nursing assessment)  Positive Skin Findings: Pressure injury stage 2 (sacrum per nursing assessment)    Nutrition Significant Labs:    Reviewed   Nutrition Specific Medications:  Reviewed     I/O:   Last BM Date: 03/05/25; Stool Appearance: Loose, Watery (03/02/25 2000)    Dietary Orders (From admission, onward)       Start     Ordered    02/26/25 0833  Adult diet Regular; 1500 mL fluid  Diet effective now        Question Answer Comment   Diet type Regular    Dietary fluid restriction / 24h: 1500 mL fluid        02/26/25 0832    02/25/25 1102  Oral nutritional supplements  Until discontinued        Question Answer Comment   Deliver with Breakfast    Deliver with Dinner    Select supplement: Nepro With Carbsteady        02/25/25 1102    02/24/25 1553  May Not Participate in Room Service  ( ROOM SERVICE MAY NOT PARTICIPATE)  Once      "   Question:  .  Answer:  Yes    02/24/25 1552                     Estimated Needs:      Method for Estimating Needs: 2030-2325kcals (28-32kcals/kg ABW)     Method for Estimating 24 Hour Protein Needs: 87-109g (1.2-1.5g/g ABW) as renal function permits     Method for Estimating 24 Hour Fluid Needs: per MD  Patient on Order Fluid Restriction: No        Nutrition Diagnosis   Malnutrition Diagnosis  Patient has Malnutrition Diagnosis: Yes  Diagnosis Status: Active  Malnutrition Diagnosis: Severe malnutrition related to chronic disease or condition  Related to: acute on chronic illness with stage 2 colon cancer  As Evidenced by: severe muscle wasting and severe subcutaneous fat loss; >5% weight loss x1mo    Nutrition Diagnosis  Patient has Nutrition Diagnosis: Yes  Diagnosis Status (1): Active  Nutrition Diagnosis 1: Increased nutrient needs  Related to (1): physiological causes increasing nutrient needs  As Evidenced by (1): wound healing needs and colon cancer; lengthy hospitalization       Nutrition Interventions/Recommendations   Nutrition prescription for oral nutrition    Nutrition Recommendations:  Individualized Nutrition Prescription Provided for : Regular diet with ONS and 1500 ml fluid restriction; ONS not to be included in fluid restriction amount    Nutrition Interventions/Goals:   Interventions: Meals and snacks, Medical food supplement  Meals and Snacks: General healthful diet, Fluid-modified diet  Goal: Consumes 3 meals per day  Medical Food Supplement: Commercial beverage medical food supplement therapy  Goal: Nepro BID (provides 420 kcal and 19 g protein per serving).      Education Documentation  No documentation found.     Patient with no diet related questions at this time.         Nutrition Monitoring and Evaluation   Food/Nutrient Related History Monitoring  Monitoring and Evaluation Plan: Intake / amount of food, Estimated Energy Intake  Estimated Energy Intake: Energy intake greater or equal to  75% of estimated energy needs  Fluid Intake: Estimated fluid intake  Intake / Amount of food: Consumes at least 75% or more of meals/snacks/supplements, Meets > 75% estimated energy needs    Anthropometric Measurements  Monitoring and Evaluation Plan: Body weight  Body Weight: Body weight - Maintain stable weight, Body weight - Promote weight restoration    Biochemical Data, Medical Tests and Procedures  Monitoring and Evaluation Plan: Electrolyte/renal panel  Electrolyte and Renal Panel: Electrolytes within normal limits    Physical Exam Findings  Monitoring and Evaluation Plan: Skin  Skin Finding: Impaired wound healing - Improved wound healing, Imparied wound healing - Skin to heal    Goal Status: Some progress toward goal(s)    Time Spent (min): 30 minutes

## 2025-03-06 VITALS
RESPIRATION RATE: 16 BRPM | HEART RATE: 60 BPM | DIASTOLIC BLOOD PRESSURE: 86 MMHG | HEIGHT: 72 IN | WEIGHT: 128.31 LBS | SYSTOLIC BLOOD PRESSURE: 154 MMHG | OXYGEN SATURATION: 98 % | TEMPERATURE: 98.1 F | BODY MASS INDEX: 17.38 KG/M2

## 2025-03-06 LAB
ALBUMIN SERPL BCP-MCNC: 3.9 G/DL (ref 3.4–5)
ANION GAP SERPL CALC-SCNC: 13 MMOL/L (ref 10–20)
ATRIAL RATE: 138 BPM
BUN SERPL-MCNC: 27 MG/DL (ref 6–23)
CALCIUM SERPL-MCNC: 9.6 MG/DL (ref 8.6–10.3)
CHLORIDE SERPL-SCNC: 105 MMOL/L (ref 98–107)
CO2 SERPL-SCNC: 21 MMOL/L (ref 21–32)
CREAT SERPL-MCNC: 0.82 MG/DL (ref 0.5–1.3)
DIASTOLIC BLOOD PRESSURE: 91 MMHG
EGFRCR SERPLBLD CKD-EPI 2021: >90 ML/MIN/1.73M*2
ERYTHROCYTE [DISTWIDTH] IN BLOOD BY AUTOMATED COUNT: 16.5 % (ref 11.5–14.5)
GLUCOSE BLD MANUAL STRIP-MCNC: 183 MG/DL (ref 74–99)
GLUCOSE BLD MANUAL STRIP-MCNC: 79 MG/DL (ref 74–99)
GLUCOSE SERPL-MCNC: 82 MG/DL (ref 74–99)
HCT VFR BLD AUTO: 28.3 % (ref 41–52)
HGB BLD-MCNC: 9.2 G/DL (ref 13.5–17.5)
MCH RBC QN AUTO: 28.1 PG (ref 26–34)
MCHC RBC AUTO-ENTMCNC: 32.5 G/DL (ref 32–36)
MCV RBC AUTO: 87 FL (ref 80–100)
NRBC BLD-RTO: 0 /100 WBCS (ref 0–0)
PHOSPHATE SERPL-MCNC: 4.4 MG/DL (ref 2.5–4.9)
PLATELET # BLD AUTO: 363 X10*3/UL (ref 150–450)
POTASSIUM SERPL-SCNC: 4.3 MMOL/L (ref 3.5–5.3)
Q ONSET: 222 MS
QRS COUNT: 25 BEATS
QRS DURATION: 86 MS
QT INTERVAL: 292 MS
QTC CALCULATION(BAZETT): 466 MS
QTC FREDERICIA: 399 MS
R AXIS: 96 DEGREES
RBC # BLD AUTO: 3.27 X10*6/UL (ref 4.5–5.9)
SODIUM SERPL-SCNC: 135 MMOL/L (ref 136–145)
SYSTOLIC BLOOD PRESSURE: 194 MMHG
T AXIS: 75 DEGREES
T OFFSET: 368 MS
VENTRICULAR RATE: 153 BPM
WBC # BLD AUTO: 7.6 X10*3/UL (ref 4.4–11.3)

## 2025-03-06 PROCEDURE — 2500000001 HC RX 250 WO HCPCS SELF ADMINISTERED DRUGS (ALT 637 FOR MEDICARE OP): Performed by: INTERNAL MEDICINE

## 2025-03-06 PROCEDURE — 2500000002 HC RX 250 W HCPCS SELF ADMINISTERED DRUGS (ALT 637 FOR MEDICARE OP, ALT 636 FOR OP/ED): Performed by: INTERNAL MEDICINE

## 2025-03-06 PROCEDURE — 82947 ASSAY GLUCOSE BLOOD QUANT: CPT

## 2025-03-06 PROCEDURE — 85027 COMPLETE CBC AUTOMATED: CPT | Performed by: INTERNAL MEDICINE

## 2025-03-06 PROCEDURE — 97530 THERAPEUTIC ACTIVITIES: CPT | Mod: GP

## 2025-03-06 PROCEDURE — 80069 RENAL FUNCTION PANEL: CPT | Performed by: INTERNAL MEDICINE

## 2025-03-06 PROCEDURE — 2500000004 HC RX 250 GENERAL PHARMACY W/ HCPCS (ALT 636 FOR OP/ED): Performed by: INTERNAL MEDICINE

## 2025-03-06 PROCEDURE — 2500000001 HC RX 250 WO HCPCS SELF ADMINISTERED DRUGS (ALT 637 FOR MEDICARE OP)

## 2025-03-06 PROCEDURE — 36415 COLL VENOUS BLD VENIPUNCTURE: CPT | Performed by: INTERNAL MEDICINE

## 2025-03-06 PROCEDURE — 99239 HOSP IP/OBS DSCHRG MGMT >30: CPT | Performed by: INTERNAL MEDICINE

## 2025-03-06 RX ORDER — ZINC OXIDE 20 G/100G
1 OINTMENT TOPICAL
Start: 2025-03-06 | End: 2025-04-05

## 2025-03-06 RX ORDER — METOPROLOL TARTRATE 50 MG/1
50 TABLET ORAL 2 TIMES DAILY
Qty: 60 TABLET | Refills: 0 | Status: SHIPPED | OUTPATIENT
Start: 2025-03-06 | End: 2025-04-05

## 2025-03-06 RX ORDER — NYSTATIN 100000 [USP'U]/G
1 POWDER TOPICAL 2 TIMES DAILY
Start: 2025-03-06 | End: 2025-04-05

## 2025-03-06 RX ADMIN — Medication 1 TABLET: at 08:11

## 2025-03-06 RX ADMIN — PANTOPRAZOLE SODIUM 40 MG: 40 INJECTION, POWDER, FOR SOLUTION INTRAVENOUS at 08:11

## 2025-03-06 RX ADMIN — NYSTATIN 1 APPLICATION: 100000 POWDER TOPICAL at 08:16

## 2025-03-06 RX ADMIN — INSULIN LISPRO 1 UNITS: 100 INJECTION, SOLUTION INTRAVENOUS; SUBCUTANEOUS at 12:01

## 2025-03-06 RX ADMIN — METOPROLOL TARTRATE 50 MG: 50 TABLET, FILM COATED ORAL at 08:11

## 2025-03-06 RX ADMIN — TAMSULOSIN HYDROCHLORIDE 0.4 MG: 0.4 CAPSULE ORAL at 08:11

## 2025-03-06 RX ADMIN — SODIUM BICARBONATE 650 MG TABLET 1300 MG: at 08:11

## 2025-03-06 RX ADMIN — ACETAMINOPHEN 650 MG: 325 TABLET, FILM COATED ORAL at 08:12

## 2025-03-06 RX ADMIN — APIXABAN 5 MG: 5 TABLET, FILM COATED ORAL at 08:11

## 2025-03-06 ASSESSMENT — COGNITIVE AND FUNCTIONAL STATUS - GENERAL
MOVING FROM LYING ON BACK TO SITTING ON SIDE OF FLAT BED WITH BEDRAILS: A LITTLE
TOILETING: A LITTLE
WALKING IN HOSPITAL ROOM: A LITTLE
CLIMB 3 TO 5 STEPS WITH RAILING: A LITTLE
DRESSING REGULAR LOWER BODY CLOTHING: A LOT
DRESSING REGULAR UPPER BODY CLOTHING: A LOT
MOBILITY SCORE: 18
DRESSING REGULAR UPPER BODY CLOTHING: A LITTLE
STANDING UP FROM CHAIR USING ARMS: A LITTLE
DAILY ACTIVITIY SCORE: 14
MOVING TO AND FROM BED TO CHAIR: A LITTLE
CLIMB 3 TO 5 STEPS WITH RAILING: TOTAL
DRESSING REGULAR LOWER BODY CLOTHING: A LITTLE
EATING MEALS: A LITTLE
TURNING FROM BACK TO SIDE WHILE IN FLAT BAD: A LITTLE
MOVING TO AND FROM BED TO CHAIR: A LITTLE
HELP NEEDED FOR BATHING: A LITTLE
TOILETING: TOTAL
MOBILITY SCORE: 16
TURNING FROM BACK TO SIDE WHILE IN FLAT BAD: A LITTLE
WALKING IN HOSPITAL ROOM: A LITTLE
PERSONAL GROOMING: A LITTLE
STANDING UP FROM CHAIR USING ARMS: A LITTLE
HELP NEEDED FOR BATHING: A LOT
MOVING FROM LYING ON BACK TO SITTING ON SIDE OF FLAT BED WITH BEDRAILS: A LITTLE
DAILY ACTIVITIY SCORE: 18
PERSONAL GROOMING: A LITTLE

## 2025-03-06 ASSESSMENT — PAIN - FUNCTIONAL ASSESSMENT: PAIN_FUNCTIONAL_ASSESSMENT: 0-10

## 2025-03-06 ASSESSMENT — PAIN SCALES - GENERAL
PAINLEVEL_OUTOF10: 4
PAINLEVEL_OUTOF10: 5 - MODERATE PAIN

## 2025-03-06 NOTE — DISCHARGE SUMMARY
Discharge Diagnosis  Acute renal failure, unspecified acute renal failure type (CMS-HCC)    Issues Requiring Follow-Up  Repeat labs in 1-2 weeks.      Discharge Meds     Medication List      START taking these medications     nystatin 100,000 unit/gram powder; Commonly known as: Mycostatin; Apply   1 Application topically 2 times a day.   zinc oxide 20 % ointment; Apply 1 Application topically every 1 hour if   needed for irritation.     CHANGE how you take these medications     Glutose-15 40 % gel oral gel; Generic drug: glucose; What changed:   Another medication with the same name was removed. Continue taking this   medication, and follow the directions you see here.   ipratropium-albuteroL 0.5-2.5 mg/3 mL nebulizer solution; Commonly known   as: Duo-Neb; What changed: Another medication with the same name was   removed. Continue taking this medication, and follow the directions you   see here.   metoprolol tartrate 50 mg tablet; Commonly known as: Lopressor; Take 1   tablet by mouth 2 times a day.; What changed: medication strength, how   much to take     CONTINUE taking these medications     acetaminophen 500 mg tablet; Commonly known as: Tylenol   Eliquis 5 mg tablet; Generic drug: apixaban; TAKE 1 TABLET (5 MG) BY   MOUTH 2 TIMES A DAY.   folic acid 1 mg tablet; Commonly known as: Folvite; TAKE 1 TABLET BY   MOUTH ONCE DAILY   HumaLOG KwikPen Insulin 100 unit/mL pen; Generic drug: insulin lispro   ISOSOURCE 1.5 ELAINE FTUB   multivitamin tablet; TAKE 1 TABLET BY MOUTH ONCE DAILY.   nitroglycerin 0.4 mg SL tablet; Commonly known as: Nitrostat   pantoprazole 40 mg EC tablet; Commonly known as: ProtoNix   sodium bicarbonate 650 mg tablet   tamsulosin 0.4 mg 24 hr capsule; Commonly known as: Flomax   Vitamin B-1 (mononitrate) 100 mg tablet; Generic drug: thiamine; TAKE 1   TABLET BY MOUTH DAILY     STOP taking these medications     cephalexin 500 mg capsule; Commonly known as: Keflex   magnesium oxide 400 mg (241.3  mg magnesium) tablet; Commonly known as:   Mag-Ox   metoprolol succinate  mg 24 hr tablet; Commonly known as:   Toprol-XL   sacubitriL-valsartan  mg tablet; Commonly known as: Entresto   spironolactone 25 mg tablet; Commonly known as: Aldactone   traMADol 50 mg tablet; Commonly known as: Ultram       Test Results Pending At Discharge  Pending Labs       No current pending labs.            Hospital Course   Esvin Gupta is a 64 y.o. year old male with a history of hypertension, pulmonary hypertension hyperlipidemia, paroxysmal A-fib on Eliquis, nonischemic cardiomyopathy, CHF, recent colectomy/ileostomy creation secondary to stage II colon cancer, RA, history of alcohol/tobacco use.  Patient presented to the ED due to a rash and pain throughout colostomy site.  Per reports, patient had ostomy bag draining onto skin for days at prior facility.  On arrival patient treated in ICU for septic shock with abdominal infection with hyponatremia, DIMITRI, Parox Afib with RVR, chronic HFpEF, and ostomy care.  He completed antibiotic course during his stay.  Cardiology adjusted his HFpEF meds as his volume status improved and shock resolved.  Entresto will be held at discharge. Nephrology managed hyponatremia, and his DIMITRI with metabolic acidosis with ostomy.  Patient will be discharged with bicarb tabs. Patient was doing well with oral intake, but if not eating well RD recommends tube feeds overnight if eating <75% of meals.       Pertinent Physical Exam At Time of Discharge  Physical Exam  Vitals reviewed.   Constitutional:       Appearance: Normal appearance.   HENT:      Head: Normocephalic and atraumatic.      Mouth/Throat:      Mouth: Mucous membranes are moist.   Eyes:      Conjunctiva/sclera: Conjunctivae normal.   Cardiovascular:      Rate and Rhythm: Normal rate.      Pulses: Normal pulses.   Pulmonary:      Effort: Pulmonary effort is normal.      Breath sounds: Normal breath sounds. No wheezing.    Abdominal:      General: Abdomen is flat. There is no distension.      Palpations: Abdomen is soft.      Tenderness: There is no guarding.      Comments: Ostomy site, clean, dry, erythema resolved   Musculoskeletal:         General: No swelling. Normal range of motion.   Skin:     General: Skin is warm and dry.   Neurological:      General: No focal deficit present.      Mental Status: He is alert. Mental status is at baseline.   Psychiatric:         Mood and Affect: Mood normal.         Behavior: Behavior normal.         Outpatient Follow-Up  No future appointments.      Ciaran Marin MD

## 2025-03-06 NOTE — PROGRESS NOTES
1330  called pt's brother Juan and aware of the discharge and transport time of 1400.  Millicent Hunter RN [facility]

## 2025-03-06 NOTE — CARE PLAN
Problem: Pain - Adult  Goal: Verbalizes/displays adequate comfort level or baseline comfort level  Outcome: Progressing     Problem: Safety - Adult  Goal: Free from fall injury  Outcome: Progressing     Problem: Discharge Planning  Goal: Discharge to home or other facility with appropriate resources  Outcome: Progressing     Problem: Chronic Conditions and Co-morbidities  Goal: Patient's chronic conditions and co-morbidity symptoms are monitored and maintained or improved  Outcome: Progressing     Problem: Nutrition  Goal: Nutrient intake appropriate for maintaining nutritional needs  Outcome: Progressing     Problem: Skin  Goal: Decreased wound size/increased tissue granulation at next dressing change  Outcome: Progressing  Flowsheets (Taken 3/5/2025 2336)  Decreased wound size/increased tissue granulation at next dressing change: Protective dressings over bony prominences  Goal: Participates in plan/prevention/treatment measures  Outcome: Progressing  Flowsheets (Taken 3/5/2025 2336)  Participates in plan/prevention/treatment measures: Elevate heels  Goal: Prevent/manage excess moisture  Outcome: Progressing  Flowsheets (Taken 3/5/2025 2336)  Prevent/manage excess moisture: Moisturize dry skin  Goal: Prevent/minimize sheer/friction injuries  Outcome: Progressing  Flowsheets (Taken 3/5/2025 2336)  Prevent/minimize sheer/friction injuries: Use pull sheet  Goal: Promote/optimize nutrition  Outcome: Progressing  Flowsheets (Taken 3/5/2025 2336)  Promote/optimize nutrition: Monitor/record intake including meals  Goal: Promote skin healing  Outcome: Progressing  Flowsheets (Taken 3/5/2025 2336)  Promote skin healing: Protective dressings over bony prominences     Problem: Fall/Injury  Goal: Not fall by end of shift  Outcome: Progressing  Goal: Be free from injury by end of the shift  Outcome: Progressing  Goal: Verbalize understanding of personal risk factors for fall in the hospital  Outcome: Progressing  Goal:  Verbalize understanding of risk factor reduction measures to prevent injury from fall in the home  Outcome: Progressing  Goal: Use assistive devices by end of the shift  Outcome: Progressing  Goal: Pace activities to prevent fatigue by end of the shift  Outcome: Progressing     Problem: Pain  Goal: Takes deep breaths with improved pain control throughout the shift  Outcome: Progressing  Goal: Turns in bed with improved pain control throughout the shift  Outcome: Progressing  Goal: Walks with improved pain control throughout the shift  Outcome: Progressing  Goal: Performs ADL's with improved pain control throughout shift  Outcome: Progressing  Goal: Participates in PT with improved pain control throughout the shift  Outcome: Progressing  Goal: Free from opioid side effects throughout the shift  Outcome: Progressing  Goal: Free from acute confusion related to pain meds throughout the shift  Outcome: Progressing   The patient's goals for the shift include      The clinical goals for the shift include Maintain safety and comfort

## 2025-03-06 NOTE — PROGRESS NOTES
Physical Therapy    Physical Therapy Treatment    Patient Name: Esvin Gupta  MRN: [id number]  Today's Date: 3/6/2025  Time Calculation  Start Time: 1107  Stop Time: 1124  Time Calculation (min): 17 min     910/910-A    Assessment/Plan   PT Assessment  PT Assessment Results: Decreased strength, Decreased endurance, Decreased mobility, Impaired balance  Rehab Prognosis: Good  Barriers to Discharge Home: Caregiver assistance  Caregiver Assistance: Patient lives alone and/or does not have reliable caregiver assistance  End of Session Communication: Bedside nurse  Assessment Comment: Pt would continue to benefit from further physical therapy to address deficits in functional mobility and gait.  End of Session Patient Position: Bed, 3 rail up, Alarm on     PT Plan  Treatment/Interventions: Bed mobility, Transfer training, Gait training, Balance training, Strengthening, Endurance training, Therapeutic exercise, Therapeutic activity  PT Plan: Ongoing PT  PT Frequency: 3 times per week  PT Discharge Recommendations: Moderate intensity level of continued care  PT - OK to Discharge: Yes    Current Problem:  Patient Active Problem List   Diagnosis    Chronic systolic heart failure, ACC/AHA stage C    Constipation    Foot drop, left foot    Hypertension    Nonischemic cardiomyopathy (Multi)    Paroxysmal atrial fibrillation with rapid ventricular response (Multi)    Pulmonary hypertension (Multi)    Rheumatoid arthritis    Tricuspid regurgitation    Alcohol dependence    Alcohol withdrawal syndrome (Multi)    Atrial fibrillation (Multi)    Gout, tophaceous    Thrombocytopenia (CMS-HCC)    BMI 23.0-23.9, adult    Never smoked tobacco    Long term current use of anticoagulant therapy       General Visit Information:   PT  Visit  PT Received On: 03/06/25  General  Reason for Referral: PT eval and treat; cellulitis of abd wall, hyperkalemia, lung ca, septic shock, leaking ileostomy and SNF. pt. had colectomy 12/2024 with  complications/resp failure, trach/peg. surgery: fecal contact dermatitis, pt. transfused this admission  Referred By: Moris Chapman  Past Medical History Relevant to Rehab: ypertension, pulmonary hypertension hyperlipidemia, paroxysmal A-fib on Eliquis, nonischemic cardiomyopathy, CHF, recent colectomy/ileostomy creation secondary to stage II colon cancer, RA, history of alcohol/tobacco use.  Patient presented to the ED due to a rash and pain throughout colostomy site  Family/Caregiver Present: No  Co-Treatment: OT  Co-Treatment Reason: to maximize safety and functional mobility  Prior to Session Communication: Bedside nurse  Patient Position Received: Bed, 3 rail up, Alarm on  General Comment: Pt resting in bed upon entering, agreeable to PT.  Subjective     Precautions:  Precautions  Medical Precautions: Fall precautions  Precautions Comment: ileostomy    Vital Signs:  Vital Signs  Vital Signs Comment: VSS throughout session  Objective     Pain:  Pain Assessment  Pain Assessment:  (5.5/10 abdominal pain)    Cognition:  Cognition  Overall Cognitive Status: Within Functional Limits    Postural Control:  Postural Control  Postural Control: Within Functional Limits    Activity Tolerance:  Activity Tolerance  Endurance: Decreased tolerance for upright activites  Early Mobility/Exercise Safety Screen: Proceed with mobilization - No exclusion criteria met    Treatments:           Bed Mobility  Bed Mobility: Yes  Bed Mobility 1  Bed Mobility Comments 1: supine<>sit SBA with HOB raised  Ambulation/Gait Training  Ambulation/Gait Training Performed:  (Pt ambulates 10ft x 1 then 15ft x 1 with WW and min assist. Pt with forward flexed posture, significant left knee hyperextension which he reports is baseline from arthritis. Slow dianelys, slightly unsteady, assist for walker management.)  Transfers  Transfer: Yes  Transfer 1  Trials/Comments 1: sit to stand with min assist with WW, cues for technique, 2 reps          Outcome  Measures:     [facility] Basic Mobility  Turning from your back to your side while in a flat bed without using bedrails: A little  Moving from lying on your back to sitting on the side of a flat bed without using bedrails: A little  Moving to and from bed to chair (including a wheelchair): A little  Standing up from a chair using your arms (e.g. wheelchair or bedside chair): A little  To walk in hospital room: A little  Climbing 3-5 steps with railing: Total  Basic Mobility - Total Score: 16       Education Documentation  Mobility Training, taught by Lenore Lang, PT at 3/6/2025 11:35 AM.  Learner: Patient  Readiness: Acceptance  Method: Explanation  Response: Verbalizes Understanding    Education Comments  No comments found.           EDUCATION:     Encounter Problems       Encounter Problems (Active)       PT Problem       PT Goal 1 STG - Pt will amb 25' using WW with SBA  (Progressing)       Start:  02/27/25    Expected End:  03/20/25            PT Goal 2 STG - Pt will transition supine <> sitting with SUP (Progressing)       Start:  02/27/25    Expected End:  03/20/25            PT Goal 3 STG - Pt will SPT bed <> chair with SBA (Progressing)       Start:  02/27/25    Expected End:  03/20/25            PT Goal 4 STG - Pt will transfer STS with SBA (Progressing)       Start:  02/27/25    Expected End:  03/20/25               Pain - Adult

## 2025-03-06 NOTE — PROGRESS NOTES
Occupational Therapy    Occupational Therapy Treatment    Name: Esvin Gupta  MRN: [id number]  Department: [facility]  Room: [address] [place] [address]  Date: 03/06/25  Time Calculation  Start Time: 1106  Stop Time: 1124  Time Calculation (min): 18 min    Assessment:  OT Assessment: Pt. progressing toward functional goals, will continue with OT treatment plan to address goals/promote independence with adl/transfers/mobility.  Prognosis: Good  Barriers to Discharge Home: Physical needs  End of Session Communication: Bedside nurse  End of Session Patient Position: Bed, 2 rail up, Alarm on  Plan:  Treatment Interventions: ADL retraining, Functional transfer training, UE strengthening/ROM, Compensatory technique education  OT Frequency: 3 times per week  OT Discharge Recommendations: Moderate intensity level of continued care  OT - OK to Discharge: Yes (to next level of care when cleared by medical team)    Subjective   Previous Visit Info:  OT Last Visit  OT Received On: 03/06/25  General:  General  Co-Treatment: PT  Co-Treatment Reason: to maximize patient safety/abilities  Prior to Session Communication: Bedside nurse  Patient Position Received: Bed, 3 rail up, Alarm on  General Comment: pt. agreeable to therapy interventions  Precautions:  Precautions Comment: ileostomy/tele, HR 98/spo2 98 pre session, 's/spo2 98 post session           Pain Assessment:  Pain Assessment  Pain Assessment:  (5.5/10 abdominal pain/ileostomy site, pt. repositioned for comfort)    Objective   Cognition:  Overall Cognitive Status: Within Functional Limits    Functional Standing Tolerance:  Functional Standing Tolerance  Functional Standing Tolerance Comments: min assist x 1, wh. walker support in preparation for functional mobility  Bed Mobility/Transfers: Bed Mobility  Bed Mobility:  (sba supine <> sit)    Transfers  Transfer:  (min assist x 1 sit<> stand from eob, completed x 2 trials)      Functional Mobility:  Functional Mobility  Functional  Mobility Performed:  (min assist x 1 via wh. walker, within hospital room, assist to advance walker intermittently on turns, unsteady and sob following 2 trials, required seated rest period)      Outcome Measures:  [facility] Daily Activity  Putting on and taking off regular lower body clothing: A lot  Bathing (including washing, rinsing, drying): A lot  Putting on and taking off regular upper body clothing: A lot  Toileting, which includes using toilet, bedpan or urinal: Total  Taking care of personal grooming such as brushing teeth: A little  Eating Meals: None  Daily Activity - Total Score: 14        Education Documentation  Precautions, taught by Karin Hobbs OT at 3/6/2025 11:32 AM.  Learner: Patient  Readiness: Acceptance  Method: Explanation  Response: Verbalizes Understanding, Needs Reinforcement  Comment: deep breathing, energy conservation    ADL Training, taught by Karin Hobbs OT at 3/6/2025 11:32 AM.  Learner: Patient  Readiness: Acceptance  Method: Explanation  Response: Verbalizes Understanding, Needs Reinforcement  Comment: deep breathing, energy conservation        Goals:  Encounter Problems       Encounter Problems (Active)       OT Goals       Increase functional mobility and  functional transfers to cga for bed/chair with dme prn   (Progressing)       Start:  02/27/25    Expected End:  03/13/25            increase bue ther ex/activity x 7-10 minutes and increase standing tolerance x 3-5 minutes with cga to promote greater activity tolerance for assist with adl.   (Progressing)       Start:  02/27/25    Expected End:  03/13/25            Increase ub/lb dressing to cga with dme prn  (Progressing)       Start:  02/27/25    Expected End:  03/13/25            pt. to apply ec/ws techniques with minimal cues to all mobility/transfer/adl to decrease fatigue/promote efficient use of energy toward completion of functional tasks.  (Progressing)       Start:  02/27/25    Expected End:  03/13/25                  Yes

## 2025-03-06 NOTE — PROGRESS NOTES
NEPHROLOGY PROGRESS NOTE    REASON FOR CONSULT: DIMITRI and hyperkalemia    SUBJECTIVE:  Patient is anxious about going to the skilled nursing facility especially with him having the heart palpitations when he was trying to ambulate.  He would like to get some therapy here ambulate and see how he does.  He has been having loose liquid output from the ostomy site.  No trouble breathing.    OBJECTIVE:    Visit Vitals  /86 (BP Location: Left arm, Patient Position: Lying)   Pulse 60   Temp 36.7 °C (98.1 °F)   Resp 16          Intake/Output Summary (Last 24 hours) at 3/6/2025 0927  Last data filed at 3/6/2025 0800  Gross per 24 hour   Intake 536 ml   Output 1570 ml   Net -1034 ml        General: Awake and Alert, In no distress, Cooperative  HEENT: Oral mucosa moist, EOMI  NECK: Supple  CHEST: No crackles, no wheeze, no tachypnea  CVS: S1,S2 heard, no rubs, irregularly irregular but rate controlled  ABD: Soft, Non Tender, BS present, ostomy noted with liquid stool  EXT: no edema    MEDICATION:    Scheduled medications  apixaban, 5 mg, oral, BID  insulin lispro, 0-5 Units, subcutaneous, TID AC  metoprolol tartrate, 50 mg, oral, BID  multivitamin with minerals, 1 tablet, oral, Daily  nystatin, 1 Application, Topical, BID  pantoprazole, 40 mg, intravenous, Daily  [Held by provider] sacubitriL-valsartan, 1 tablet, oral, BID  sodium bicarbonate, 1,300 mg, oral, BID  tamsulosin, 0.4 mg, oral, Daily      Continuous medications       PRN medications  PRN medications: acetaminophen **OR** acetaminophen **OR** acetaminophen, alteplase, ipratropium-albuteroL, metoprolol, morphine, ondansetron **OR** ondansetron, zinc oxide     RESULTS:    Lab Results   Component Value Date    WBC 7.6 03/06/2025    HGB 9.2 (L) 03/06/2025    HCT 28.3 (L) 03/06/2025    MCV 87 03/06/2025     03/06/2025        Lab Results   Component Value Date    CREATININE 0.82 03/06/2025    BUN 27 (H) 03/06/2025     (L) 03/06/2025    K 4.3 03/06/2025      03/06/2025    CO2 21 03/06/2025        Radiology Imaging reviewed      ASSESSMENT/PLAN:     1.  DIMITRI: Renal function is normal.  Peak creatinine was 5.62.  His Entresto is on hold due to the labile blood pressure. He has heart failure with low EF.  Stable for discharge from nephrology standpoint.    2.    Hyponatremia: Serum sodium at 135.  Initial serum sodium was 118.  Encourage good nutrition.  On 1500 mL fluid restriction.     3.  Anemia: S/p transfusion.  Hemoglobin currently at 8.4.  He is on apixaban.    4.  Acidosis: Non-anion gap.  Secondary to ostomy output.  He is on oral sodium bicarbonate 1300 mg twice daily with improvement.  Continue the same.      Thank You very much for allowing me to participate in the care of this Patient    This document was created using dragon dictation and may contain unintended error    Bunny London MD   03/06/25

## 2025-03-11 LAB
ATRIAL RATE: 138 BPM
DIASTOLIC BLOOD PRESSURE: 91 MMHG
Q ONSET: 222 MS
QRS COUNT: 25 BEATS
QRS DURATION: 86 MS
QT INTERVAL: 292 MS
QTC CALCULATION(BAZETT): 466 MS
QTC FREDERICIA: 399 MS
R AXIS: 96 DEGREES
SYSTOLIC BLOOD PRESSURE: 194 MMHG
T AXIS: 75 DEGREES
T OFFSET: 368 MS
VENTRICULAR RATE: 153 BPM

## 2025-03-11 RX ORDER — SACUBITRIL AND VALSARTAN 97; 103 MG/1; MG/1
1 TABLET, FILM COATED ORAL 2 TIMES DAILY
Qty: 60 TABLET | Refills: 10 | Status: SHIPPED | OUTPATIENT
Start: 2025-03-11

## 2025-03-20 DIAGNOSIS — R53.83 OTHER FATIGUE: ICD-10-CM

## 2025-03-20 RX ORDER — MULTIVITAMIN
1 TABLET ORAL DAILY
Qty: 30 TABLET | Refills: 10 | Status: SHIPPED | OUTPATIENT
Start: 2025-03-20

## 2025-03-29 DIAGNOSIS — R53.83 OTHER FATIGUE: ICD-10-CM

## 2025-03-31 RX ORDER — FOLIC ACID 1 MG/1
1 TABLET ORAL DAILY
Qty: 30 TABLET | Refills: 10 | Status: SHIPPED | OUTPATIENT
Start: 2025-03-31

## 2025-04-11 ENCOUNTER — LAB REQUISITION (OUTPATIENT)
Dept: LAB | Facility: HOSPITAL | Age: 65
End: 2025-04-11
Payer: MEDICAID

## 2025-04-11 DIAGNOSIS — R53.1 WEAKNESS: ICD-10-CM

## 2025-04-11 DIAGNOSIS — E55.9 VITAMIN D DEFICIENCY, UNSPECIFIED: ICD-10-CM

## 2025-04-11 LAB
ANION GAP SERPL CALC-SCNC: 17 MMOL/L (ref 10–20)
BUN SERPL-MCNC: 62 MG/DL (ref 6–23)
CALCIUM SERPL-MCNC: 10.6 MG/DL (ref 8.6–10.3)
CHLORIDE SERPL-SCNC: 93 MMOL/L (ref 98–107)
CO2 SERPL-SCNC: 26 MMOL/L (ref 21–32)
CREAT SERPL-MCNC: 1.54 MG/DL (ref 0.5–1.3)
EGFRCR SERPLBLD CKD-EPI 2021: 50 ML/MIN/1.73M*2
ERYTHROCYTE [DISTWIDTH] IN BLOOD BY AUTOMATED COUNT: 15.5 % (ref 11.5–14.5)
GLUCOSE SERPL-MCNC: 85 MG/DL (ref 74–99)
HCT VFR BLD AUTO: 44.8 % (ref 41–52)
HGB BLD-MCNC: 14.6 G/DL (ref 13.5–17.5)
MCH RBC QN AUTO: 29.9 PG (ref 26–34)
MCHC RBC AUTO-ENTMCNC: 32.6 G/DL (ref 32–36)
MCV RBC AUTO: 92 FL (ref 80–100)
NRBC BLD-RTO: 0 /100 WBCS (ref 0–0)
PLATELET # BLD AUTO: 246 X10*3/UL (ref 150–450)
POTASSIUM SERPL-SCNC: 4.6 MMOL/L (ref 3.5–5.3)
RBC # BLD AUTO: 4.89 X10*6/UL (ref 4.5–5.9)
SODIUM SERPL-SCNC: 131 MMOL/L (ref 136–145)
WBC # BLD AUTO: 8.9 X10*3/UL (ref 4.4–11.3)

## 2025-04-11 PROCEDURE — 80048 BASIC METABOLIC PNL TOTAL CA: CPT | Mod: OUT | Performed by: INTERNAL MEDICINE

## 2025-04-11 PROCEDURE — 85027 COMPLETE CBC AUTOMATED: CPT | Mod: OUT | Performed by: INTERNAL MEDICINE

## 2025-04-11 PROCEDURE — 36415 COLL VENOUS BLD VENIPUNCTURE: CPT | Mod: OUT | Performed by: INTERNAL MEDICINE

## 2025-04-14 ENCOUNTER — LAB REQUISITION (OUTPATIENT)
Dept: LAB | Facility: HOSPITAL | Age: 65
End: 2025-04-14
Payer: MEDICAID

## 2025-04-14 DIAGNOSIS — N17.9 ACUTE KIDNEY FAILURE, UNSPECIFIED: ICD-10-CM

## 2025-04-14 LAB
ANION GAP SERPL CALC-SCNC: 15 MMOL/L (ref 10–20)
BUN SERPL-MCNC: 46 MG/DL (ref 6–23)
CALCIUM SERPL-MCNC: 10.2 MG/DL (ref 8.6–10.3)
CHLORIDE SERPL-SCNC: 97 MMOL/L (ref 98–107)
CO2 SERPL-SCNC: 26 MMOL/L (ref 21–32)
CREAT SERPL-MCNC: 1.14 MG/DL (ref 0.5–1.3)
EGFRCR SERPLBLD CKD-EPI 2021: 72 ML/MIN/1.73M*2
GLUCOSE SERPL-MCNC: 75 MG/DL (ref 74–99)
POTASSIUM SERPL-SCNC: 4 MMOL/L (ref 3.5–5.3)
SODIUM SERPL-SCNC: 134 MMOL/L (ref 136–145)

## 2025-04-14 PROCEDURE — 36415 COLL VENOUS BLD VENIPUNCTURE: CPT | Mod: OUT | Performed by: INTERNAL MEDICINE

## 2025-04-14 PROCEDURE — 80048 BASIC METABOLIC PNL TOTAL CA: CPT | Mod: OUT | Performed by: INTERNAL MEDICINE

## 2025-04-21 ENCOUNTER — LAB REQUISITION (OUTPATIENT)
Dept: LAB | Facility: HOSPITAL | Age: 65
End: 2025-04-21
Payer: MEDICAID

## 2025-04-21 DIAGNOSIS — E87.6 HYPOKALEMIA: ICD-10-CM

## 2025-04-21 DIAGNOSIS — E87.1 HYPO-OSMOLALITY AND HYPONATREMIA: ICD-10-CM

## 2025-04-21 DIAGNOSIS — I50.9 HEART FAILURE, UNSPECIFIED: ICD-10-CM

## 2025-04-21 DIAGNOSIS — E46 UNSPECIFIED PROTEIN-CALORIE MALNUTRITION (MULTI): ICD-10-CM

## 2025-04-21 LAB
ANION GAP SERPL CALC-SCNC: 16 MMOL/L (ref 10–20)
BUN SERPL-MCNC: 44 MG/DL (ref 6–23)
CALCIUM SERPL-MCNC: 9.8 MG/DL (ref 8.6–10.3)
CHLORIDE SERPL-SCNC: 100 MMOL/L (ref 98–107)
CO2 SERPL-SCNC: 21 MMOL/L (ref 21–32)
CREAT SERPL-MCNC: 1.21 MG/DL (ref 0.5–1.3)
EGFRCR SERPLBLD CKD-EPI 2021: 67 ML/MIN/1.73M*2
GLUCOSE SERPL-MCNC: 76 MG/DL (ref 74–99)
POTASSIUM SERPL-SCNC: 4.6 MMOL/L (ref 3.5–5.3)
SODIUM SERPL-SCNC: 132 MMOL/L (ref 136–145)

## 2025-04-21 PROCEDURE — 84520 ASSAY OF UREA NITROGEN: CPT | Mod: OUT | Performed by: INTERNAL MEDICINE

## 2025-04-21 PROCEDURE — 36415 COLL VENOUS BLD VENIPUNCTURE: CPT | Mod: OUT | Performed by: INTERNAL MEDICINE

## 2025-04-26 DIAGNOSIS — I48.0 PAROXYSMAL ATRIAL FIBRILLATION WITH RAPID VENTRICULAR RESPONSE (MULTI): ICD-10-CM

## 2025-04-27 RX ORDER — METOPROLOL TARTRATE 50 MG/1
TABLET ORAL
Qty: 60 TABLET | Refills: 11 | OUTPATIENT
Start: 2025-04-27

## 2025-05-13 ENCOUNTER — LAB REQUISITION (OUTPATIENT)
Dept: LAB | Facility: HOSPITAL | Age: 65
End: 2025-05-13
Payer: MEDICAID

## 2025-05-13 DIAGNOSIS — A41.9 SEPSIS, UNSPECIFIED ORGANISM (MULTI): ICD-10-CM

## 2025-05-13 LAB
ANION GAP SERPL CALC-SCNC: 18 MMOL/L (ref 10–20)
BUN SERPL-MCNC: 88 MG/DL (ref 6–23)
CALCIUM SERPL-MCNC: 10.7 MG/DL (ref 8.6–10.3)
CHLORIDE SERPL-SCNC: 90 MMOL/L (ref 98–107)
CO2 SERPL-SCNC: 25 MMOL/L (ref 21–32)
CREAT SERPL-MCNC: 1.77 MG/DL (ref 0.5–1.3)
EGFRCR SERPLBLD CKD-EPI 2021: 43 ML/MIN/1.73M*2
ERYTHROCYTE [DISTWIDTH] IN BLOOD BY AUTOMATED COUNT: 13.2 % (ref 11.5–14.5)
GLUCOSE SERPL-MCNC: 86 MG/DL (ref 74–99)
HCT VFR BLD AUTO: 34.9 % (ref 41–52)
HGB BLD-MCNC: 12.1 G/DL (ref 13.5–17.5)
MCH RBC QN AUTO: 30.9 PG (ref 26–34)
MCHC RBC AUTO-ENTMCNC: 34.7 G/DL (ref 32–36)
MCV RBC AUTO: 89 FL (ref 80–100)
NRBC BLD-RTO: 0 /100 WBCS (ref 0–0)
PLATELET # BLD AUTO: 312 X10*3/UL (ref 150–450)
POTASSIUM SERPL-SCNC: 4.6 MMOL/L (ref 3.5–5.3)
RBC # BLD AUTO: 3.91 X10*6/UL (ref 4.5–5.9)
SODIUM SERPL-SCNC: 128 MMOL/L (ref 136–145)
WBC # BLD AUTO: 10.1 X10*3/UL (ref 4.4–11.3)

## 2025-05-13 PROCEDURE — 80048 BASIC METABOLIC PNL TOTAL CA: CPT | Mod: OUT | Performed by: INTERNAL MEDICINE

## 2025-05-13 PROCEDURE — 85027 COMPLETE CBC AUTOMATED: CPT | Mod: OUT | Performed by: INTERNAL MEDICINE

## 2025-05-13 PROCEDURE — 36415 COLL VENOUS BLD VENIPUNCTURE: CPT | Mod: OUT | Performed by: INTERNAL MEDICINE

## 2025-05-14 ENCOUNTER — LAB REQUISITION (OUTPATIENT)
Dept: LAB | Facility: HOSPITAL | Age: 65
End: 2025-05-14
Payer: MEDICAID

## 2025-05-14 DIAGNOSIS — N17.9 ACUTE KIDNEY FAILURE, UNSPECIFIED: ICD-10-CM

## 2025-05-14 LAB
ANION GAP SERPL CALC-SCNC: 17 MMOL/L (ref 10–20)
BUN SERPL-MCNC: 77 MG/DL (ref 6–23)
CALCIUM SERPL-MCNC: 10 MG/DL (ref 8.6–10.3)
CHLORIDE SERPL-SCNC: 93 MMOL/L (ref 98–107)
CO2 SERPL-SCNC: 23 MMOL/L (ref 21–32)
CREAT SERPL-MCNC: 1.48 MG/DL (ref 0.5–1.3)
EGFRCR SERPLBLD CKD-EPI 2021: 53 ML/MIN/1.73M*2
GLUCOSE SERPL-MCNC: 78 MG/DL (ref 74–99)
POTASSIUM SERPL-SCNC: 4.5 MMOL/L (ref 3.5–5.3)
SODIUM SERPL-SCNC: 128 MMOL/L (ref 136–145)

## 2025-05-14 PROCEDURE — 80048 BASIC METABOLIC PNL TOTAL CA: CPT | Mod: OUT | Performed by: INTERNAL MEDICINE

## 2025-05-14 PROCEDURE — 36415 COLL VENOUS BLD VENIPUNCTURE: CPT | Mod: OUT | Performed by: INTERNAL MEDICINE

## 2025-05-15 ENCOUNTER — LAB REQUISITION (OUTPATIENT)
Dept: LAB | Facility: HOSPITAL | Age: 65
End: 2025-05-15
Payer: MEDICAID

## 2025-05-15 DIAGNOSIS — E87.6 HYPOKALEMIA: ICD-10-CM

## 2025-05-15 LAB
ANION GAP SERPL CALC-SCNC: 12 MMOL/L (ref 10–20)
BUN SERPL-MCNC: 49 MG/DL (ref 6–23)
CALCIUM SERPL-MCNC: 9.4 MG/DL (ref 8.6–10.3)
CHLORIDE SERPL-SCNC: 100 MMOL/L (ref 98–107)
CO2 SERPL-SCNC: 26 MMOL/L (ref 21–32)
CREAT SERPL-MCNC: 1.16 MG/DL (ref 0.5–1.3)
EGFRCR SERPLBLD CKD-EPI 2021: 71 ML/MIN/1.73M*2
GLUCOSE SERPL-MCNC: 79 MG/DL (ref 74–99)
POTASSIUM SERPL-SCNC: 4.4 MMOL/L (ref 3.5–5.3)
SODIUM SERPL-SCNC: 134 MMOL/L (ref 136–145)

## 2025-05-15 PROCEDURE — 36415 COLL VENOUS BLD VENIPUNCTURE: CPT | Mod: OUT | Performed by: INTERNAL MEDICINE

## 2025-05-15 PROCEDURE — 80048 BASIC METABOLIC PNL TOTAL CA: CPT | Mod: OUT | Performed by: INTERNAL MEDICINE

## 2025-05-16 ENCOUNTER — LAB REQUISITION (OUTPATIENT)
Dept: LAB | Facility: HOSPITAL | Age: 65
End: 2025-05-16
Payer: MEDICAID

## 2025-05-16 DIAGNOSIS — L03.90 CELLULITIS, UNSPECIFIED: ICD-10-CM

## 2025-05-16 LAB
ANION GAP SERPL CALC-SCNC: 11 MMOL/L (ref 10–20)
BUN SERPL-MCNC: 32 MG/DL (ref 6–23)
CALCIUM SERPL-MCNC: 9.1 MG/DL (ref 8.6–10.3)
CHLORIDE SERPL-SCNC: 106 MMOL/L (ref 98–107)
CO2 SERPL-SCNC: 22 MMOL/L (ref 21–32)
CREAT SERPL-MCNC: 1.01 MG/DL (ref 0.5–1.3)
EGFRCR SERPLBLD CKD-EPI 2021: 84 ML/MIN/1.73M*2
GLUCOSE SERPL-MCNC: 77 MG/DL (ref 74–99)
POTASSIUM SERPL-SCNC: 4 MMOL/L (ref 3.5–5.3)
SODIUM SERPL-SCNC: 135 MMOL/L (ref 136–145)

## 2025-05-16 PROCEDURE — 82374 ASSAY BLOOD CARBON DIOXIDE: CPT | Mod: OUT | Performed by: INTERNAL MEDICINE

## 2025-05-16 PROCEDURE — 36415 COLL VENOUS BLD VENIPUNCTURE: CPT | Mod: OUT | Performed by: INTERNAL MEDICINE

## 2025-05-19 ENCOUNTER — LAB REQUISITION (OUTPATIENT)
Dept: LAB | Facility: HOSPITAL | Age: 65
End: 2025-05-19
Payer: MEDICAID

## 2025-05-19 DIAGNOSIS — R60.9 EDEMA, UNSPECIFIED: ICD-10-CM

## 2025-05-19 DIAGNOSIS — D64.9 ANEMIA, UNSPECIFIED: ICD-10-CM

## 2025-05-19 DIAGNOSIS — I50.9 HEART FAILURE, UNSPECIFIED: ICD-10-CM

## 2025-05-19 LAB
ANION GAP SERPL CALC-SCNC: 12 MMOL/L (ref 10–20)
BUN SERPL-MCNC: 19 MG/DL (ref 6–23)
CALCIUM SERPL-MCNC: 9.1 MG/DL (ref 8.6–10.3)
CHLORIDE SERPL-SCNC: 110 MMOL/L (ref 98–107)
CO2 SERPL-SCNC: 20 MMOL/L (ref 21–32)
CREAT SERPL-MCNC: 0.79 MG/DL (ref 0.5–1.3)
EGFRCR SERPLBLD CKD-EPI 2021: >90 ML/MIN/1.73M*2
ERYTHROCYTE [DISTWIDTH] IN BLOOD BY AUTOMATED COUNT: 13.4 % (ref 11.5–14.5)
GLUCOSE SERPL-MCNC: 74 MG/DL (ref 74–99)
HCT VFR BLD AUTO: 29.6 % (ref 41–52)
HGB BLD-MCNC: 9.9 G/DL (ref 13.5–17.5)
MCH RBC QN AUTO: 31.1 PG (ref 26–34)
MCHC RBC AUTO-ENTMCNC: 33.4 G/DL (ref 32–36)
MCV RBC AUTO: 93 FL (ref 80–100)
NRBC BLD-RTO: 0 /100 WBCS (ref 0–0)
PLATELET # BLD AUTO: 235 X10*3/UL (ref 150–450)
POTASSIUM SERPL-SCNC: 4.2 MMOL/L (ref 3.5–5.3)
RBC # BLD AUTO: 3.18 X10*6/UL (ref 4.5–5.9)
SODIUM SERPL-SCNC: 138 MMOL/L (ref 136–145)
WBC # BLD AUTO: 6.7 X10*3/UL (ref 4.4–11.3)

## 2025-05-19 PROCEDURE — 80048 BASIC METABOLIC PNL TOTAL CA: CPT | Mod: OUT | Performed by: INTERNAL MEDICINE

## 2025-05-19 PROCEDURE — 36415 COLL VENOUS BLD VENIPUNCTURE: CPT | Mod: OUT | Performed by: INTERNAL MEDICINE

## 2025-05-19 PROCEDURE — 85027 COMPLETE CBC AUTOMATED: CPT | Mod: OUT | Performed by: INTERNAL MEDICINE

## 2025-07-17 DIAGNOSIS — I48.11 LONGSTANDING PERSISTENT ATRIAL FIBRILLATION (MULTI): ICD-10-CM

## 2025-07-18 RX ORDER — APIXABAN 5 MG/1
TABLET, FILM COATED ORAL
Qty: 60 TABLET | Refills: 11 | Status: SHIPPED | OUTPATIENT
Start: 2025-07-18